# Patient Record
Sex: FEMALE | Race: WHITE | Employment: OTHER | URBAN - METROPOLITAN AREA
[De-identification: names, ages, dates, MRNs, and addresses within clinical notes are randomized per-mention and may not be internally consistent; named-entity substitution may affect disease eponyms.]

---

## 2017-10-11 ENCOUNTER — HOSPITAL ENCOUNTER (OUTPATIENT)
Dept: GENERAL RADIOLOGY | Age: 72
Discharge: HOME | End: 2017-10-11
Payer: MEDICARE

## 2017-10-11 DIAGNOSIS — R13.10 DYSPHAGIA, UNSPECIFIED TYPE: ICD-10-CM

## 2017-10-11 PROCEDURE — 2500000003 HC RX 250 WO HCPCS: Performed by: INTERNAL MEDICINE

## 2017-10-11 PROCEDURE — 74230 X-RAY XM SWLNG FUNCJ C+: CPT

## 2017-10-11 RX ADMIN — BARIUM SULFATE 50 G: 0.81 POWDER, FOR SUSPENSION ORAL at 14:05

## 2017-10-11 RX ADMIN — BARIUM SULFATE 80 ML: 400 SUSPENSION ORAL at 14:05

## 2017-12-07 PROBLEM — M47.812 CERVICAL ARTHRITIS: Status: ACTIVE | Noted: 2017-12-07

## 2017-12-07 PROBLEM — M47.812 SPONDYLOSIS OF CERVICAL REGION WITHOUT MYELOPATHY OR RADICULOPATHY: Status: ACTIVE | Noted: 2017-12-07

## 2018-04-20 ENCOUNTER — HOSPITAL ENCOUNTER (INPATIENT)
Age: 73
LOS: 3 days | Discharge: HOME | DRG: 390 | End: 2018-04-24
Attending: INTERNAL MEDICINE | Admitting: INTERNAL MEDICINE
Payer: MEDICARE

## 2018-04-20 DIAGNOSIS — K56.600 PARTIAL SMALL BOWEL OBSTRUCTION (HCC): Primary | ICD-10-CM

## 2018-04-20 DIAGNOSIS — R11.2 NON-INTRACTABLE VOMITING WITH NAUSEA, UNSPECIFIED VOMITING TYPE: ICD-10-CM

## 2018-04-20 PROCEDURE — 99285 EMERGENCY DEPT VISIT HI MDM: CPT

## 2018-04-20 ASSESSMENT — PAIN DESCRIPTION - FREQUENCY: FREQUENCY: CONTINUOUS

## 2018-04-20 ASSESSMENT — PAIN DESCRIPTION - PAIN TYPE: TYPE: ACUTE PAIN

## 2018-04-20 ASSESSMENT — PAIN SCALES - GENERAL: PAINLEVEL_OUTOF10: 9

## 2018-04-20 ASSESSMENT — PAIN DESCRIPTION - LOCATION: LOCATION: BACK;STERNUM

## 2018-04-20 ASSESSMENT — PAIN DESCRIPTION - DESCRIPTORS: DESCRIPTORS: ACHING

## 2018-04-21 ENCOUNTER — APPOINTMENT (OUTPATIENT)
Dept: CT IMAGING | Age: 73
DRG: 390 | End: 2018-04-21
Payer: MEDICARE

## 2018-04-21 PROBLEM — K56.600 SMALL BOWEL OBSTRUCTION, PARTIAL (HCC): Status: ACTIVE | Noted: 2018-04-21

## 2018-04-21 LAB
ALBUMIN SERPL-MCNC: 4.4 G/DL (ref 3.9–4.9)
ALP BLD-CCNC: 75 U/L (ref 40–130)
ALT SERPL-CCNC: 22 U/L (ref 0–33)
ANION GAP SERPL CALCULATED.3IONS-SCNC: 13 MEQ/L (ref 7–13)
AST SERPL-CCNC: 18 U/L (ref 0–35)
BASOPHILS ABSOLUTE: 0 K/UL (ref 0–0.2)
BASOPHILS RELATIVE PERCENT: 0.3 %
BILIRUB SERPL-MCNC: 0.5 MG/DL (ref 0–1.2)
BUN BLDV-MCNC: 20 MG/DL (ref 8–23)
CALCIUM SERPL-MCNC: 8.7 MG/DL (ref 8.6–10.2)
CHLORIDE BLD-SCNC: 98 MEQ/L (ref 98–107)
CO2: 25 MEQ/L (ref 22–29)
CREAT SERPL-MCNC: 0.81 MG/DL (ref 0.5–0.9)
EOSINOPHILS ABSOLUTE: 0.1 K/UL (ref 0–0.7)
EOSINOPHILS RELATIVE PERCENT: 0.8 %
GFR AFRICAN AMERICAN: >60
GFR NON-AFRICAN AMERICAN: >60
GLOBULIN: 2.4 G/DL (ref 2.3–3.5)
GLUCOSE BLD-MCNC: 123 MG/DL (ref 74–109)
HCT VFR BLD CALC: 44.5 % (ref 37–47)
HEMOGLOBIN: 15 G/DL (ref 12–16)
LACTIC ACID: 1.7 MMOL/L (ref 0.5–2.2)
LIPASE: 72 U/L (ref 13–60)
LYMPHOCYTES ABSOLUTE: 1.9 K/UL (ref 1–4.8)
LYMPHOCYTES RELATIVE PERCENT: 11.6 %
MCH RBC QN AUTO: 30.6 PG (ref 27–31.3)
MCHC RBC AUTO-ENTMCNC: 33.6 % (ref 33–37)
MCV RBC AUTO: 91 FL (ref 82–100)
MONOCYTES ABSOLUTE: 1 K/UL (ref 0.2–0.8)
MONOCYTES RELATIVE PERCENT: 6.5 %
NEUTROPHILS ABSOLUTE: 12.9 K/UL (ref 1.4–6.5)
NEUTROPHILS RELATIVE PERCENT: 80.8 %
PDW BLD-RTO: 14.3 % (ref 11.5–14.5)
PLATELET # BLD: 212 K/UL (ref 130–400)
POTASSIUM SERPL-SCNC: 4.5 MEQ/L (ref 3.5–5.1)
RBC # BLD: 4.89 M/UL (ref 4.2–5.4)
SODIUM BLD-SCNC: 136 MEQ/L (ref 132–144)
TOTAL PROTEIN: 6.8 G/DL (ref 6.4–8.1)
WBC # BLD: 16 K/UL (ref 4.8–10.8)

## 2018-04-21 PROCEDURE — 96372 THER/PROPH/DIAG INJ SC/IM: CPT

## 2018-04-21 PROCEDURE — 83690 ASSAY OF LIPASE: CPT

## 2018-04-21 PROCEDURE — 6360000002 HC RX W HCPCS: Performed by: PHYSICIAN ASSISTANT

## 2018-04-21 PROCEDURE — 85025 COMPLETE CBC W/AUTO DIFF WBC: CPT

## 2018-04-21 PROCEDURE — 1210000000 HC MED SURG R&B

## 2018-04-21 PROCEDURE — 80053 COMPREHEN METABOLIC PANEL: CPT

## 2018-04-21 PROCEDURE — 83605 ASSAY OF LACTIC ACID: CPT

## 2018-04-21 PROCEDURE — 6360000002 HC RX W HCPCS: Performed by: INTERNAL MEDICINE

## 2018-04-21 PROCEDURE — 2580000003 HC RX 258: Performed by: PHYSICIAN ASSISTANT

## 2018-04-21 PROCEDURE — 74176 CT ABD & PELVIS W/O CONTRAST: CPT

## 2018-04-21 PROCEDURE — 2580000003 HC RX 258: Performed by: INTERNAL MEDICINE

## 2018-04-21 PROCEDURE — 36415 COLL VENOUS BLD VENIPUNCTURE: CPT

## 2018-04-21 RX ORDER — PANTOPRAZOLE SODIUM 40 MG/1
40 TABLET, DELAYED RELEASE ORAL
Status: DISCONTINUED | OUTPATIENT
Start: 2018-04-21 | End: 2018-04-23

## 2018-04-21 RX ORDER — MORPHINE SULFATE 2 MG/ML
2 INJECTION, SOLUTION INTRAMUSCULAR; INTRAVENOUS
Status: DISCONTINUED | OUTPATIENT
Start: 2018-04-21 | End: 2018-04-23

## 2018-04-21 RX ORDER — SODIUM CHLORIDE 9 MG/ML
INJECTION, SOLUTION INTRAVENOUS CONTINUOUS
Status: CANCELLED | OUTPATIENT
Start: 2018-04-21

## 2018-04-21 RX ORDER — MORPHINE SULFATE 2 MG/ML
1 INJECTION, SOLUTION INTRAMUSCULAR; INTRAVENOUS
Status: DISCONTINUED | OUTPATIENT
Start: 2018-04-21 | End: 2018-04-23

## 2018-04-21 RX ORDER — SODIUM CHLORIDE 0.9 % (FLUSH) 0.9 %
10 SYRINGE (ML) INJECTION PRN
Status: DISCONTINUED | OUTPATIENT
Start: 2018-04-21 | End: 2018-04-24 | Stop reason: HOSPADM

## 2018-04-21 RX ORDER — HYDRALAZINE HYDROCHLORIDE 20 MG/ML
10 INJECTION INTRAMUSCULAR; INTRAVENOUS EVERY 4 HOURS PRN
Status: DISCONTINUED | OUTPATIENT
Start: 2018-04-21 | End: 2018-04-24 | Stop reason: HOSPADM

## 2018-04-21 RX ORDER — SODIUM CHLORIDE 0.9 % (FLUSH) 0.9 %
10 SYRINGE (ML) INJECTION EVERY 12 HOURS SCHEDULED
Status: DISCONTINUED | OUTPATIENT
Start: 2018-04-21 | End: 2018-04-24 | Stop reason: HOSPADM

## 2018-04-21 RX ORDER — PROMETHAZINE HYDROCHLORIDE 25 MG/ML
25 INJECTION, SOLUTION INTRAMUSCULAR; INTRAVENOUS ONCE
Status: COMPLETED | OUTPATIENT
Start: 2018-04-21 | End: 2018-04-21

## 2018-04-21 RX ORDER — ONDANSETRON 2 MG/ML
4 INJECTION INTRAMUSCULAR; INTRAVENOUS ONCE
Status: DISCONTINUED | OUTPATIENT
Start: 2018-04-21 | End: 2018-04-21 | Stop reason: HOSPADM

## 2018-04-21 RX ORDER — PROMETHAZINE HYDROCHLORIDE 25 MG/ML
6.25 INJECTION, SOLUTION INTRAMUSCULAR; INTRAVENOUS EVERY 6 HOURS PRN
Status: DISCONTINUED | OUTPATIENT
Start: 2018-04-21 | End: 2018-04-24 | Stop reason: HOSPADM

## 2018-04-21 RX ORDER — DICYCLOMINE HYDROCHLORIDE 10 MG/ML
20 INJECTION INTRAMUSCULAR ONCE
Status: COMPLETED | OUTPATIENT
Start: 2018-04-21 | End: 2018-04-21

## 2018-04-21 RX ORDER — DEXTROSE, SODIUM CHLORIDE, SODIUM LACTATE, POTASSIUM CHLORIDE, AND CALCIUM CHLORIDE 5; .6; .31; .03; .02 G/100ML; G/100ML; G/100ML; G/100ML; G/100ML
INJECTION, SOLUTION INTRAVENOUS CONTINUOUS
Status: DISCONTINUED | OUTPATIENT
Start: 2018-04-21 | End: 2018-04-23

## 2018-04-21 RX ORDER — ACETAMINOPHEN 325 MG/1
650 TABLET ORAL EVERY 4 HOURS PRN
Status: DISCONTINUED | OUTPATIENT
Start: 2018-04-21 | End: 2018-04-24 | Stop reason: HOSPADM

## 2018-04-21 RX ORDER — 0.9 % SODIUM CHLORIDE 0.9 %
1000 INTRAVENOUS SOLUTION INTRAVENOUS ONCE
Status: COMPLETED | OUTPATIENT
Start: 2018-04-21 | End: 2018-04-21

## 2018-04-21 RX ADMIN — Medication 10 ML: at 09:46

## 2018-04-21 RX ADMIN — MORPHINE SULFATE 1 MG: 2 INJECTION, SOLUTION INTRAMUSCULAR; INTRAVENOUS at 14:30

## 2018-04-21 RX ADMIN — DICYCLOMINE HYDROCHLORIDE 20 MG: 20 INJECTION, SOLUTION INTRAMUSCULAR at 00:22

## 2018-04-21 RX ADMIN — PROMETHAZINE HYDROCHLORIDE 6.25 MG: 25 INJECTION INTRAMUSCULAR; INTRAVENOUS at 14:39

## 2018-04-21 RX ADMIN — SODIUM CHLORIDE 1000 ML: 9 INJECTION, SOLUTION INTRAVENOUS at 00:23

## 2018-04-21 RX ADMIN — PROMETHAZINE HYDROCHLORIDE 25 MG: 25 INJECTION INTRAMUSCULAR; INTRAVENOUS at 00:22

## 2018-04-21 RX ADMIN — SODIUM CHLORIDE, SODIUM LACTATE, POTASSIUM CHLORIDE, CALCIUM CHLORIDE AND DEXTROSE MONOHYDRATE: 5; 600; 310; 30; 20 INJECTION, SOLUTION INTRAVENOUS at 04:50

## 2018-04-21 RX ADMIN — SODIUM CHLORIDE, SODIUM LACTATE, POTASSIUM CHLORIDE, CALCIUM CHLORIDE AND DEXTROSE MONOHYDRATE: 5; 600; 310; 30; 20 INJECTION, SOLUTION INTRAVENOUS at 20:21

## 2018-04-21 ASSESSMENT — PAIN SCALES - GENERAL
PAINLEVEL_OUTOF10: 5
PAINLEVEL_OUTOF10: 0
PAINLEVEL_OUTOF10: 4
PAINLEVEL_OUTOF10: 6

## 2018-04-21 ASSESSMENT — ENCOUNTER SYMPTOMS
TROUBLE SWALLOWING: 0
ALLERGIC/IMMUNOLOGIC NEGATIVE: 1
BLOOD IN STOOL: 0
NAUSEA: 1
ABDOMINAL DISTENTION: 0
CONSTIPATION: 0
VOMITING: 1
COUGH: 0
SHORTNESS OF BREATH: 0
COLOR CHANGE: 0
APNEA: 0
DIARRHEA: 1
EYE PAIN: 0
WHEEZING: 0
ABDOMINAL PAIN: 1

## 2018-04-21 ASSESSMENT — PAIN DESCRIPTION - LOCATION
LOCATION: BACK
LOCATION: HEAD

## 2018-04-21 ASSESSMENT — PAIN DESCRIPTION - PAIN TYPE
TYPE: ACUTE PAIN
TYPE: ACUTE PAIN

## 2018-04-21 ASSESSMENT — PAIN DESCRIPTION - DESCRIPTORS: DESCRIPTORS: ACHING

## 2018-04-21 ASSESSMENT — PAIN DESCRIPTION - FREQUENCY: FREQUENCY: CONTINUOUS

## 2018-04-22 PROCEDURE — 1210000000 HC MED SURG R&B

## 2018-04-22 PROCEDURE — 2580000003 HC RX 258: Performed by: INTERNAL MEDICINE

## 2018-04-22 RX ADMIN — SODIUM CHLORIDE, SODIUM LACTATE, POTASSIUM CHLORIDE, CALCIUM CHLORIDE AND DEXTROSE MONOHYDRATE: 5; 600; 310; 30; 20 INJECTION, SOLUTION INTRAVENOUS at 11:10

## 2018-04-22 ASSESSMENT — PAIN SCALES - GENERAL
PAINLEVEL_OUTOF10: 0
PAINLEVEL_OUTOF10: 0
PAINLEVEL_OUTOF10: 2
PAINLEVEL_OUTOF10: 2

## 2018-04-23 ENCOUNTER — APPOINTMENT (OUTPATIENT)
Dept: GENERAL RADIOLOGY | Age: 73
DRG: 390 | End: 2018-04-23
Payer: MEDICARE

## 2018-04-23 PROBLEM — M47.812 SPONDYLOSIS OF CERVICAL REGION WITHOUT MYELOPATHY OR RADICULOPATHY: Status: RESOLVED | Noted: 2017-12-07 | Resolved: 2018-04-23

## 2018-04-23 PROBLEM — M47.812 CERVICAL ARTHRITIS: Status: RESOLVED | Noted: 2017-12-07 | Resolved: 2018-04-23

## 2018-04-23 LAB
ANION GAP SERPL CALCULATED.3IONS-SCNC: 11 MEQ/L (ref 7–13)
BUN BLDV-MCNC: 12 MG/DL (ref 8–23)
CALCIUM SERPL-MCNC: 8.8 MG/DL (ref 8.6–10.2)
CHLORIDE BLD-SCNC: 102 MEQ/L (ref 98–107)
CO2: 30 MEQ/L (ref 22–29)
CREAT SERPL-MCNC: 0.78 MG/DL (ref 0.5–0.9)
GFR AFRICAN AMERICAN: >60
GFR NON-AFRICAN AMERICAN: >60
GLUCOSE BLD-MCNC: 105 MG/DL (ref 74–109)
HCT VFR BLD CALC: 46.9 % (ref 37–47)
HEMOGLOBIN: 16 G/DL (ref 12–16)
MCH RBC QN AUTO: 30.8 PG (ref 27–31.3)
MCHC RBC AUTO-ENTMCNC: 34.1 % (ref 33–37)
MCV RBC AUTO: 90.5 FL (ref 82–100)
PDW BLD-RTO: 14.3 % (ref 11.5–14.5)
PLATELET # BLD: 176 K/UL (ref 130–400)
POTASSIUM SERPL-SCNC: 4.5 MEQ/L (ref 3.5–5.1)
RBC # BLD: 5.18 M/UL (ref 4.2–5.4)
SODIUM BLD-SCNC: 143 MEQ/L (ref 132–144)
WBC # BLD: 8.5 K/UL (ref 4.8–10.8)

## 2018-04-23 PROCEDURE — 36415 COLL VENOUS BLD VENIPUNCTURE: CPT

## 2018-04-23 PROCEDURE — 1210000000 HC MED SURG R&B

## 2018-04-23 PROCEDURE — 80048 BASIC METABOLIC PNL TOTAL CA: CPT

## 2018-04-23 PROCEDURE — 85027 COMPLETE CBC AUTOMATED: CPT

## 2018-04-23 PROCEDURE — 6360000002 HC RX W HCPCS: Performed by: INTERNAL MEDICINE

## 2018-04-23 PROCEDURE — 2580000003 HC RX 258: Performed by: INTERNAL MEDICINE

## 2018-04-23 PROCEDURE — C9113 INJ PANTOPRAZOLE SODIUM, VIA: HCPCS | Performed by: INTERNAL MEDICINE

## 2018-04-23 PROCEDURE — 74018 RADEX ABDOMEN 1 VIEW: CPT

## 2018-04-23 RX ORDER — ESCITALOPRAM OXALATE 10 MG/1
10 TABLET ORAL DAILY
Status: DISCONTINUED | OUTPATIENT
Start: 2018-04-23 | End: 2018-04-23

## 2018-04-23 RX ORDER — PANTOPRAZOLE SODIUM 40 MG/1
40 TABLET, DELAYED RELEASE ORAL
Status: DISCONTINUED | OUTPATIENT
Start: 2018-04-24 | End: 2018-04-24 | Stop reason: HOSPADM

## 2018-04-23 RX ORDER — LOSARTAN POTASSIUM 50 MG/1
25 TABLET ORAL DAILY
Status: DISCONTINUED | OUTPATIENT
Start: 2018-04-23 | End: 2018-04-24 | Stop reason: HOSPADM

## 2018-04-23 RX ORDER — GABAPENTIN 300 MG/1
300 CAPSULE ORAL 2 TIMES DAILY PRN
Status: DISCONTINUED | OUTPATIENT
Start: 2018-04-23 | End: 2018-04-23

## 2018-04-23 RX ORDER — LOSARTAN POTASSIUM 25 MG/1
25 TABLET ORAL DAILY
Status: DISCONTINUED | OUTPATIENT
Start: 2018-04-23 | End: 2018-04-23

## 2018-04-23 RX ORDER — PHENAZOPYRIDINE HYDROCHLORIDE 100 MG/1
100 TABLET, FILM COATED ORAL 3 TIMES DAILY PRN
Status: DISCONTINUED | OUTPATIENT
Start: 2018-04-23 | End: 2018-04-24 | Stop reason: HOSPADM

## 2018-04-23 RX ORDER — PANTOPRAZOLE SODIUM 40 MG/10ML
40 INJECTION, POWDER, LYOPHILIZED, FOR SOLUTION INTRAVENOUS DAILY
Status: DISCONTINUED | OUTPATIENT
Start: 2018-04-23 | End: 2018-04-23

## 2018-04-23 RX ORDER — ONDANSETRON 4 MG/1
4 TABLET, FILM COATED ORAL EVERY 8 HOURS PRN
Status: DISCONTINUED | OUTPATIENT
Start: 2018-04-23 | End: 2018-04-24 | Stop reason: HOSPADM

## 2018-04-23 RX ORDER — ASPIRIN 325 MG
325 TABLET ORAL DAILY
Status: DISCONTINUED | OUTPATIENT
Start: 2018-04-23 | End: 2018-04-24 | Stop reason: HOSPADM

## 2018-04-23 RX ORDER — ASPIRIN 325 MG
325 TABLET ORAL DAILY
Status: DISCONTINUED | OUTPATIENT
Start: 2018-04-23 | End: 2018-04-23

## 2018-04-23 RX ORDER — ONDANSETRON 2 MG/ML
4 INJECTION INTRAMUSCULAR; INTRAVENOUS EVERY 6 HOURS PRN
Status: DISCONTINUED | OUTPATIENT
Start: 2018-04-23 | End: 2018-04-23

## 2018-04-23 RX ORDER — ESCITALOPRAM OXALATE 10 MG/1
10 TABLET ORAL DAILY
Status: DISCONTINUED | OUTPATIENT
Start: 2018-04-23 | End: 2018-04-24 | Stop reason: HOSPADM

## 2018-04-23 RX ORDER — 0.9 % SODIUM CHLORIDE 0.9 %
10 VIAL (ML) INJECTION DAILY
Status: DISCONTINUED | OUTPATIENT
Start: 2018-04-23 | End: 2018-04-23

## 2018-04-23 RX ADMIN — Medication 10 ML: at 12:42

## 2018-04-23 RX ADMIN — SODIUM CHLORIDE, SODIUM LACTATE, POTASSIUM CHLORIDE, CALCIUM CHLORIDE AND DEXTROSE MONOHYDRATE: 5; 600; 310; 30; 20 INJECTION, SOLUTION INTRAVENOUS at 00:15

## 2018-04-23 RX ADMIN — ENOXAPARIN SODIUM 40 MG: 40 INJECTION SUBCUTANEOUS at 12:41

## 2018-04-23 RX ADMIN — PANTOPRAZOLE SODIUM 40 MG: 40 INJECTION, POWDER, FOR SOLUTION INTRAVENOUS at 12:42

## 2018-04-23 ASSESSMENT — ENCOUNTER SYMPTOMS
VOMITING: 0
NAUSEA: 0

## 2018-04-23 ASSESSMENT — PAIN SCALES - GENERAL: PAINLEVEL_OUTOF10: 1

## 2018-04-24 VITALS
WEIGHT: 150 LBS | BODY MASS INDEX: 27.6 KG/M2 | TEMPERATURE: 98.1 F | DIASTOLIC BLOOD PRESSURE: 94 MMHG | HEART RATE: 98 BPM | OXYGEN SATURATION: 97 % | SYSTOLIC BLOOD PRESSURE: 122 MMHG | HEIGHT: 62 IN | RESPIRATION RATE: 18 BRPM

## 2018-04-24 PROBLEM — K56.600 SMALL BOWEL OBSTRUCTION, PARTIAL (HCC): Status: RESOLVED | Noted: 2018-04-21 | Resolved: 2018-04-24

## 2018-04-24 PROCEDURE — 6360000002 HC RX W HCPCS: Performed by: INTERNAL MEDICINE

## 2018-04-24 PROCEDURE — 6370000000 HC RX 637 (ALT 250 FOR IP): Performed by: INTERNAL MEDICINE

## 2018-04-24 RX ADMIN — ENOXAPARIN SODIUM 40 MG: 40 INJECTION SUBCUTANEOUS at 10:02

## 2018-04-24 RX ADMIN — PANTOPRAZOLE SODIUM 40 MG: 40 TABLET, DELAYED RELEASE ORAL at 05:59

## 2018-04-24 RX ADMIN — ASPIRIN 325 MG: 325 TABLET, COATED ORAL at 10:03

## 2018-04-24 ASSESSMENT — PAIN SCALES - GENERAL
PAINLEVEL_OUTOF10: 0
PAINLEVEL_OUTOF10: 0

## 2018-04-24 ASSESSMENT — ENCOUNTER SYMPTOMS
NAUSEA: 0
VOMITING: 0

## 2019-03-27 ENCOUNTER — HOSPITAL ENCOUNTER (OUTPATIENT)
Dept: GENERAL RADIOLOGY | Age: 74
Discharge: HOME | End: 2019-03-29
Payer: MEDICARE

## 2019-03-27 DIAGNOSIS — R13.10 DYSPHAGIA, UNSPECIFIED TYPE: ICD-10-CM

## 2019-03-27 PROCEDURE — G8996 SWALLOW CURRENT STATUS: HCPCS

## 2019-03-27 PROCEDURE — 74230 X-RAY XM SWLNG FUNCJ C+: CPT

## 2019-03-27 PROCEDURE — 2500000003 HC RX 250 WO HCPCS: Performed by: PHYSICIAN ASSISTANT

## 2019-03-27 PROCEDURE — G8997 SWALLOW GOAL STATUS: HCPCS

## 2019-03-27 PROCEDURE — 92611 MOTION FLUOROSCOPY/SWALLOW: CPT

## 2019-03-27 RX ADMIN — BARIUM SULFATE 50 G: 0.81 POWDER, FOR SUSPENSION ORAL at 13:12

## 2019-03-27 RX ADMIN — BARIUM SULFATE 80 ML: 400 SUSPENSION ORAL at 13:12

## 2019-12-26 ENCOUNTER — APPOINTMENT (OUTPATIENT)
Dept: GENERAL RADIOLOGY | Age: 74
End: 2019-12-26
Payer: MEDICARE

## 2019-12-26 ENCOUNTER — HOSPITAL ENCOUNTER (OUTPATIENT)
Age: 74
Setting detail: OBSERVATION
Discharge: HOME | End: 2019-12-29
Attending: EMERGENCY MEDICINE | Admitting: INTERNAL MEDICINE
Payer: MEDICARE

## 2019-12-26 DIAGNOSIS — R07.9 CHEST PAIN, UNSPECIFIED TYPE: Primary | ICD-10-CM

## 2019-12-26 LAB
ALBUMIN SERPL-MCNC: 3.9 G/DL (ref 3.5–4.6)
ALP BLD-CCNC: 73 U/L (ref 40–130)
ALT SERPL-CCNC: 17 U/L (ref 0–33)
ANION GAP SERPL CALCULATED.3IONS-SCNC: 10 MEQ/L (ref 9–15)
APTT: 30.2 SEC (ref 24.4–36.8)
AST SERPL-CCNC: 15 U/L (ref 0–35)
BASOPHILS ABSOLUTE: 0 K/UL (ref 0–0.2)
BASOPHILS RELATIVE PERCENT: 0.5 %
BILIRUB SERPL-MCNC: <0.2 MG/DL (ref 0.2–0.7)
BILIRUBIN URINE: NEGATIVE
BLOOD, URINE: NEGATIVE
BUN BLDV-MCNC: 11 MG/DL (ref 8–23)
CALCIUM SERPL-MCNC: 9 MG/DL (ref 8.5–9.9)
CHLORIDE BLD-SCNC: 106 MEQ/L (ref 95–107)
CLARITY: ABNORMAL
CO2: 25 MEQ/L (ref 20–31)
COLOR: YELLOW
CREAT SERPL-MCNC: 0.66 MG/DL (ref 0.5–0.9)
EKG ATRIAL RATE: 68 BPM
EKG P AXIS: -8 DEGREES
EKG P-R INTERVAL: 152 MS
EKG Q-T INTERVAL: 376 MS
EKG QRS DURATION: 78 MS
EKG QTC CALCULATION (BAZETT): 399 MS
EKG R AXIS: -6 DEGREES
EKG T AXIS: 0 DEGREES
EKG VENTRICULAR RATE: 68 BPM
EOSINOPHILS ABSOLUTE: 0 K/UL (ref 0–0.7)
EOSINOPHILS RELATIVE PERCENT: 0.5 %
GFR AFRICAN AMERICAN: >60
GFR NON-AFRICAN AMERICAN: >60
GLOBULIN: 2.7 G/DL (ref 2.3–3.5)
GLUCOSE BLD-MCNC: 102 MG/DL (ref 70–99)
GLUCOSE URINE: NEGATIVE MG/DL
HCT VFR BLD CALC: 40.9 % (ref 37–47)
HEMOGLOBIN: 13.8 G/DL (ref 12–16)
INR BLD: 1
KETONES, URINE: NEGATIVE MG/DL
LEUKOCYTE ESTERASE, URINE: NEGATIVE
LYMPHOCYTES ABSOLUTE: 1.6 K/UL (ref 1–4.8)
LYMPHOCYTES RELATIVE PERCENT: 23.9 %
MAGNESIUM: 2.2 MG/DL (ref 1.7–2.4)
MCH RBC QN AUTO: 30.7 PG (ref 27–31.3)
MCHC RBC AUTO-ENTMCNC: 33.7 % (ref 33–37)
MCV RBC AUTO: 91.1 FL (ref 82–100)
MONOCYTES ABSOLUTE: 0.5 K/UL (ref 0.2–0.8)
MONOCYTES RELATIVE PERCENT: 7.6 %
NEUTROPHILS ABSOLUTE: 4.5 K/UL (ref 1.4–6.5)
NEUTROPHILS RELATIVE PERCENT: 67.5 %
NITRITE, URINE: NEGATIVE
PDW BLD-RTO: 14.2 % (ref 11.5–14.5)
PH UA: 5.5 (ref 5–9)
PLATELET # BLD: 204 K/UL (ref 130–400)
POTASSIUM SERPL-SCNC: 3.9 MEQ/L (ref 3.4–4.9)
PRO-BNP: 143 PG/ML
PROTEIN UA: NEGATIVE MG/DL
PROTHROMBIN TIME: 13 SEC (ref 12.3–14.9)
RBC # BLD: 4.48 M/UL (ref 4.2–5.4)
SODIUM BLD-SCNC: 141 MEQ/L (ref 135–144)
SPECIFIC GRAVITY UA: 1.01 (ref 1–1.03)
TOTAL PROTEIN: 6.6 G/DL (ref 6.3–8)
TROPONIN: <0.01 NG/ML (ref 0–0.01)
UROBILINOGEN, URINE: 0.2 E.U./DL
WBC # BLD: 6.6 K/UL (ref 4.8–10.8)

## 2019-12-26 PROCEDURE — 85730 THROMBOPLASTIN TIME PARTIAL: CPT

## 2019-12-26 PROCEDURE — 83880 ASSAY OF NATRIURETIC PEPTIDE: CPT

## 2019-12-26 PROCEDURE — 81003 URINALYSIS AUTO W/O SCOPE: CPT

## 2019-12-26 PROCEDURE — 6370000000 HC RX 637 (ALT 250 FOR IP): Performed by: INTERNAL MEDICINE

## 2019-12-26 PROCEDURE — 36415 COLL VENOUS BLD VENIPUNCTURE: CPT

## 2019-12-26 PROCEDURE — 87086 URINE CULTURE/COLONY COUNT: CPT

## 2019-12-26 PROCEDURE — 96372 THER/PROPH/DIAG INJ SC/IM: CPT

## 2019-12-26 PROCEDURE — 96375 TX/PRO/DX INJ NEW DRUG ADDON: CPT

## 2019-12-26 PROCEDURE — G0378 HOSPITAL OBSERVATION PER HR: HCPCS

## 2019-12-26 PROCEDURE — 71046 X-RAY EXAM CHEST 2 VIEWS: CPT

## 2019-12-26 PROCEDURE — 6360000002 HC RX W HCPCS: Performed by: EMERGENCY MEDICINE

## 2019-12-26 PROCEDURE — 83735 ASSAY OF MAGNESIUM: CPT

## 2019-12-26 PROCEDURE — 6370000000 HC RX 637 (ALT 250 FOR IP): Performed by: EMERGENCY MEDICINE

## 2019-12-26 PROCEDURE — 2580000003 HC RX 258: Performed by: INTERNAL MEDICINE

## 2019-12-26 PROCEDURE — 85025 COMPLETE CBC W/AUTO DIFF WBC: CPT

## 2019-12-26 PROCEDURE — 80053 COMPREHEN METABOLIC PANEL: CPT

## 2019-12-26 PROCEDURE — 6360000002 HC RX W HCPCS: Performed by: INTERNAL MEDICINE

## 2019-12-26 PROCEDURE — 96374 THER/PROPH/DIAG INJ IV PUSH: CPT

## 2019-12-26 PROCEDURE — 2580000003 HC RX 258: Performed by: EMERGENCY MEDICINE

## 2019-12-26 PROCEDURE — 99285 EMERGENCY DEPT VISIT HI MDM: CPT

## 2019-12-26 PROCEDURE — 85610 PROTHROMBIN TIME: CPT

## 2019-12-26 PROCEDURE — 93005 ELECTROCARDIOGRAM TRACING: CPT | Performed by: EMERGENCY MEDICINE

## 2019-12-26 PROCEDURE — 93010 ELECTROCARDIOGRAM REPORT: CPT | Performed by: INTERNAL MEDICINE

## 2019-12-26 PROCEDURE — 84484 ASSAY OF TROPONIN QUANT: CPT

## 2019-12-26 PROCEDURE — 99219 PR INITIAL OBSERVATION CARE/DAY 50 MINUTES: CPT | Performed by: INTERNAL MEDICINE

## 2019-12-26 RX ORDER — ONDANSETRON 2 MG/ML
4 INJECTION INTRAMUSCULAR; INTRAVENOUS EVERY 6 HOURS PRN
Status: DISCONTINUED | OUTPATIENT
Start: 2019-12-26 | End: 2019-12-29 | Stop reason: HOSPADM

## 2019-12-26 RX ORDER — NITROGLYCERIN 0.4 MG/1
0.4 TABLET SUBLINGUAL ONCE
Status: COMPLETED | OUTPATIENT
Start: 2019-12-26 | End: 2019-12-26

## 2019-12-26 RX ORDER — PANTOPRAZOLE SODIUM 40 MG/1
40 TABLET, DELAYED RELEASE ORAL
Status: DISCONTINUED | OUTPATIENT
Start: 2019-12-27 | End: 2019-12-29 | Stop reason: HOSPADM

## 2019-12-26 RX ORDER — ONDANSETRON 2 MG/ML
4 INJECTION INTRAMUSCULAR; INTRAVENOUS ONCE
Status: COMPLETED | OUTPATIENT
Start: 2019-12-26 | End: 2019-12-26

## 2019-12-26 RX ORDER — ASPIRIN 325 MG
325 TABLET ORAL DAILY
Status: DISCONTINUED | OUTPATIENT
Start: 2019-12-27 | End: 2019-12-29 | Stop reason: HOSPADM

## 2019-12-26 RX ORDER — NITROGLYCERIN 0.4 MG/1
0.4 TABLET SUBLINGUAL EVERY 5 MIN PRN
Status: DISCONTINUED | OUTPATIENT
Start: 2019-12-26 | End: 2019-12-29 | Stop reason: HOSPADM

## 2019-12-26 RX ORDER — ROSUVASTATIN CALCIUM 10 MG/1
10 TABLET, COATED ORAL DAILY
Status: DISCONTINUED | OUTPATIENT
Start: 2019-12-26 | End: 2019-12-29 | Stop reason: HOSPADM

## 2019-12-26 RX ORDER — ACETAMINOPHEN 325 MG/1
650 TABLET ORAL EVERY 4 HOURS PRN
Status: DISCONTINUED | OUTPATIENT
Start: 2019-12-26 | End: 2019-12-29 | Stop reason: HOSPADM

## 2019-12-26 RX ORDER — HYDRALAZINE HYDROCHLORIDE 20 MG/ML
10 INJECTION INTRAMUSCULAR; INTRAVENOUS EVERY 4 HOURS PRN
Status: DISCONTINUED | OUTPATIENT
Start: 2019-12-26 | End: 2019-12-27

## 2019-12-26 RX ORDER — ESCITALOPRAM OXALATE 10 MG/1
10 TABLET ORAL DAILY
Status: DISCONTINUED | OUTPATIENT
Start: 2019-12-26 | End: 2019-12-26

## 2019-12-26 RX ORDER — MORPHINE SULFATE 4 MG/ML
4 INJECTION, SOLUTION INTRAMUSCULAR; INTRAVENOUS EVERY 4 HOURS PRN
Status: DISCONTINUED | OUTPATIENT
Start: 2019-12-26 | End: 2019-12-29 | Stop reason: HOSPADM

## 2019-12-26 RX ORDER — ASPIRIN 81 MG/1
324 TABLET, CHEWABLE ORAL ONCE
Status: COMPLETED | OUTPATIENT
Start: 2019-12-26 | End: 2019-12-26

## 2019-12-26 RX ORDER — LOSARTAN POTASSIUM 25 MG/1
25 TABLET ORAL DAILY
Status: DISCONTINUED | OUTPATIENT
Start: 2019-12-26 | End: 2019-12-29 | Stop reason: HOSPADM

## 2019-12-26 RX ORDER — 0.9 % SODIUM CHLORIDE 0.9 %
1000 INTRAVENOUS SOLUTION INTRAVENOUS ONCE
Status: COMPLETED | OUTPATIENT
Start: 2019-12-26 | End: 2019-12-26

## 2019-12-26 RX ORDER — SODIUM CHLORIDE 0.9 % (FLUSH) 0.9 %
10 SYRINGE (ML) INJECTION EVERY 12 HOURS SCHEDULED
Status: DISCONTINUED | OUTPATIENT
Start: 2019-12-26 | End: 2019-12-29 | Stop reason: HOSPADM

## 2019-12-26 RX ORDER — SODIUM CHLORIDE 0.9 % (FLUSH) 0.9 %
10 SYRINGE (ML) INJECTION PRN
Status: DISCONTINUED | OUTPATIENT
Start: 2019-12-26 | End: 2019-12-27

## 2019-12-26 RX ORDER — MORPHINE SULFATE 2 MG/ML
4 INJECTION, SOLUTION INTRAMUSCULAR; INTRAVENOUS
Status: COMPLETED | OUTPATIENT
Start: 2019-12-26 | End: 2019-12-26

## 2019-12-26 RX ORDER — PHENAZOPYRIDINE HYDROCHLORIDE 100 MG/1
100 TABLET, FILM COATED ORAL
Status: DISCONTINUED | OUTPATIENT
Start: 2019-12-26 | End: 2019-12-29 | Stop reason: HOSPADM

## 2019-12-26 RX ORDER — GABAPENTIN 300 MG/1
300 CAPSULE ORAL 2 TIMES DAILY PRN
Status: DISCONTINUED | OUTPATIENT
Start: 2019-12-26 | End: 2019-12-29 | Stop reason: HOSPADM

## 2019-12-26 RX ADMIN — ENOXAPARIN SODIUM 40 MG: 40 INJECTION SUBCUTANEOUS at 21:03

## 2019-12-26 RX ADMIN — NITROGLYCERIN 0.4 MG: 0.4 TABLET, ORALLY DISINTEGRATING SUBLINGUAL at 11:33

## 2019-12-26 RX ADMIN — ACETAMINOPHEN 650 MG: 325 TABLET ORAL at 21:04

## 2019-12-26 RX ADMIN — SODIUM CHLORIDE 1000 ML: 9 INJECTION, SOLUTION INTRAVENOUS at 11:33

## 2019-12-26 RX ADMIN — ASPIRIN 81 MG 324 MG: 81 TABLET ORAL at 12:40

## 2019-12-26 RX ADMIN — MORPHINE SULFATE 4 MG: 2 INJECTION, SOLUTION INTRAMUSCULAR; INTRAVENOUS at 11:38

## 2019-12-26 RX ADMIN — ONDANSETRON 4 MG: 2 INJECTION INTRAMUSCULAR; INTRAVENOUS at 11:41

## 2019-12-26 RX ADMIN — Medication 10 ML: at 21:04

## 2019-12-26 ASSESSMENT — PAIN DESCRIPTION - FREQUENCY
FREQUENCY: INTERMITTENT

## 2019-12-26 ASSESSMENT — PAIN DESCRIPTION - DESCRIPTORS
DESCRIPTORS: DULL;SHARP
DESCRIPTORS: DULL;ACHING
DESCRIPTORS: DULL;ACHING

## 2019-12-26 ASSESSMENT — PAIN DESCRIPTION - LOCATION
LOCATION: CHEST
LOCATION: CHEST;HEAD
LOCATION: CHEST

## 2019-12-26 ASSESSMENT — ENCOUNTER SYMPTOMS
EYES NEGATIVE: 1
GASTROINTESTINAL NEGATIVE: 1
SHORTNESS OF BREATH: 0
COUGH: 0
ALLERGIC/IMMUNOLOGIC NEGATIVE: 1
VOMITING: 0
BACK PAIN: 0
CHEST TIGHTNESS: 1
WHEEZING: 0
DIARRHEA: 0
SORE THROAT: 0
SHORTNESS OF BREATH: 1
NAUSEA: 0
ABDOMINAL PAIN: 0

## 2019-12-26 ASSESSMENT — PAIN SCALES - GENERAL
PAINLEVEL_OUTOF10: 0
PAINLEVEL_OUTOF10: 5
PAINLEVEL_OUTOF10: 3
PAINLEVEL_OUTOF10: 0

## 2019-12-26 ASSESSMENT — PAIN DESCRIPTION - PAIN TYPE
TYPE: ACUTE PAIN
TYPE: ACUTE PAIN

## 2019-12-27 PROCEDURE — 2700000000 HC OXYGEN THERAPY PER DAY

## 2019-12-27 PROCEDURE — 96375 TX/PRO/DX INJ NEW DRUG ADDON: CPT

## 2019-12-27 PROCEDURE — 6370000000 HC RX 637 (ALT 250 FOR IP): Performed by: INTERNAL MEDICINE

## 2019-12-27 PROCEDURE — G0378 HOSPITAL OBSERVATION PER HR: HCPCS

## 2019-12-27 PROCEDURE — 96376 TX/PRO/DX INJ SAME DRUG ADON: CPT

## 2019-12-27 PROCEDURE — 6360000002 HC RX W HCPCS: Performed by: INTERNAL MEDICINE

## 2019-12-27 PROCEDURE — 99217 PR OBSERVATION CARE DISCHARGE MANAGEMENT: CPT | Performed by: INTERNAL MEDICINE

## 2019-12-27 PROCEDURE — 96372 THER/PROPH/DIAG INJ SC/IM: CPT

## 2019-12-27 PROCEDURE — 2580000003 HC RX 258: Performed by: INTERNAL MEDICINE

## 2019-12-27 RX ORDER — SODIUM CHLORIDE 9 MG/ML
INJECTION, SOLUTION INTRAVENOUS CONTINUOUS
Status: DISCONTINUED | OUTPATIENT
Start: 2019-12-27 | End: 2019-12-29 | Stop reason: HOSPADM

## 2019-12-27 RX ORDER — LOSARTAN POTASSIUM 25 MG/1
25 TABLET ORAL DAILY
Qty: 30 TABLET | Refills: 3 | Status: SHIPPED | OUTPATIENT
Start: 2019-12-27 | End: 2020-01-30

## 2019-12-27 RX ORDER — SUMATRIPTAN 50 MG/1
50 TABLET, FILM COATED ORAL ONCE
Status: DISCONTINUED | OUTPATIENT
Start: 2019-12-27 | End: 2019-12-27

## 2019-12-27 RX ORDER — HYDRALAZINE HYDROCHLORIDE 20 MG/ML
20 INJECTION INTRAMUSCULAR; INTRAVENOUS EVERY 4 HOURS PRN
Status: DISCONTINUED | OUTPATIENT
Start: 2019-12-27 | End: 2019-12-29

## 2019-12-27 RX ORDER — SODIUM CHLORIDE 9 MG/ML
INJECTION, SOLUTION INTRAVENOUS
Status: DISPENSED
Start: 2019-12-27 | End: 2019-12-28

## 2019-12-27 RX ORDER — SUMATRIPTAN 6 MG/.5ML
6 INJECTION, SOLUTION SUBCUTANEOUS ONCE
Status: COMPLETED | OUTPATIENT
Start: 2019-12-27 | End: 2019-12-27

## 2019-12-27 RX ADMIN — HYDRALAZINE HYDROCHLORIDE 10 MG: 20 INJECTION INTRAMUSCULAR; INTRAVENOUS at 07:46

## 2019-12-27 RX ADMIN — Medication 10 ML: at 07:45

## 2019-12-27 RX ADMIN — SODIUM CHLORIDE: 9 INJECTION, SOLUTION INTRAVENOUS at 18:13

## 2019-12-27 RX ADMIN — ONDANSETRON 4 MG: 2 INJECTION INTRAMUSCULAR; INTRAVENOUS at 18:14

## 2019-12-27 RX ADMIN — ONDANSETRON 4 MG: 2 INJECTION INTRAMUSCULAR; INTRAVENOUS at 07:45

## 2019-12-27 RX ADMIN — ONDANSETRON 4 MG: 2 INJECTION INTRAMUSCULAR; INTRAVENOUS at 22:08

## 2019-12-27 RX ADMIN — PANTOPRAZOLE SODIUM 40 MG: 40 TABLET, DELAYED RELEASE ORAL at 06:28

## 2019-12-27 RX ADMIN — ACETAMINOPHEN 650 MG: 325 TABLET ORAL at 10:05

## 2019-12-27 RX ADMIN — SUMATRIPTAN SUCCINATE 6 MG: 6 INJECTION SUBCUTANEOUS at 13:39

## 2019-12-27 RX ADMIN — HYDRALAZINE HYDROCHLORIDE 20 MG: 20 INJECTION INTRAMUSCULAR; INTRAVENOUS at 15:28

## 2019-12-27 ASSESSMENT — PAIN SCALES - GENERAL
PAINLEVEL_OUTOF10: 8
PAINLEVEL_OUTOF10: 0

## 2019-12-28 LAB
REASON FOR REJECTION: NORMAL
REJECTED TEST: NORMAL
TROPONIN: <0.01 NG/ML (ref 0–0.01)
TROPONIN: <0.01 NG/ML (ref 0–0.01)
URINE CULTURE, ROUTINE: NORMAL

## 2019-12-28 PROCEDURE — 2580000003 HC RX 258: Performed by: INTERNAL MEDICINE

## 2019-12-28 PROCEDURE — 84484 ASSAY OF TROPONIN QUANT: CPT

## 2019-12-28 PROCEDURE — 6370000000 HC RX 637 (ALT 250 FOR IP): Performed by: INTERNAL MEDICINE

## 2019-12-28 PROCEDURE — 6360000002 HC RX W HCPCS: Performed by: INTERNAL MEDICINE

## 2019-12-28 PROCEDURE — 96372 THER/PROPH/DIAG INJ SC/IM: CPT

## 2019-12-28 PROCEDURE — 2700000000 HC OXYGEN THERAPY PER DAY

## 2019-12-28 PROCEDURE — G0378 HOSPITAL OBSERVATION PER HR: HCPCS

## 2019-12-28 PROCEDURE — 36415 COLL VENOUS BLD VENIPUNCTURE: CPT

## 2019-12-28 PROCEDURE — 96376 TX/PRO/DX INJ SAME DRUG ADON: CPT

## 2019-12-28 RX ORDER — SUMATRIPTAN 6 MG/.5ML
6 INJECTION, SOLUTION SUBCUTANEOUS ONCE
Status: COMPLETED | OUTPATIENT
Start: 2019-12-28 | End: 2019-12-28

## 2019-12-28 RX ADMIN — ONDANSETRON 4 MG: 2 INJECTION INTRAMUSCULAR; INTRAVENOUS at 08:34

## 2019-12-28 RX ADMIN — ONDANSETRON 4 MG: 2 INJECTION INTRAMUSCULAR; INTRAVENOUS at 02:05

## 2019-12-28 RX ADMIN — PHENAZOPYRIDINE 100 MG: 100 TABLET ORAL at 08:34

## 2019-12-28 RX ADMIN — SODIUM CHLORIDE: 9 INJECTION, SOLUTION INTRAVENOUS at 08:40

## 2019-12-28 RX ADMIN — ROSUVASTATIN CALCIUM 10 MG: 10 TABLET, FILM COATED ORAL at 20:59

## 2019-12-28 RX ADMIN — LOSARTAN POTASSIUM 25 MG: 25 TABLET ORAL at 08:34

## 2019-12-28 RX ADMIN — ENOXAPARIN SODIUM 40 MG: 40 INJECTION SUBCUTANEOUS at 08:34

## 2019-12-28 RX ADMIN — SUMATRIPTAN SUCCINATE 6 MG: 6 INJECTION SUBCUTANEOUS at 09:44

## 2019-12-28 RX ADMIN — ASPIRIN 325 MG: 325 TABLET, COATED ORAL at 08:34

## 2019-12-28 RX ADMIN — PHENAZOPYRIDINE 100 MG: 100 TABLET ORAL at 16:47

## 2019-12-28 RX ADMIN — Medication 10 ML: at 21:00

## 2019-12-28 RX ADMIN — Medication 10 ML: at 08:35

## 2019-12-28 RX ADMIN — PANTOPRAZOLE SODIUM 40 MG: 40 TABLET, DELAYED RELEASE ORAL at 05:48

## 2019-12-28 RX ADMIN — PHENAZOPYRIDINE 100 MG: 100 TABLET ORAL at 12:07

## 2019-12-28 RX ADMIN — SODIUM CHLORIDE: 9 INJECTION, SOLUTION INTRAVENOUS at 20:59

## 2019-12-28 ASSESSMENT — PAIN SCALES - GENERAL
PAINLEVEL_OUTOF10: 0
PAINLEVEL_OUTOF10: 8

## 2019-12-29 VITALS
SYSTOLIC BLOOD PRESSURE: 132 MMHG | HEART RATE: 75 BPM | OXYGEN SATURATION: 95 % | BODY MASS INDEX: 25.43 KG/M2 | WEIGHT: 152.6 LBS | HEIGHT: 65 IN | DIASTOLIC BLOOD PRESSURE: 74 MMHG | RESPIRATION RATE: 18 BRPM | TEMPERATURE: 98.4 F

## 2019-12-29 LAB
TROPONIN: <0.01 NG/ML (ref 0–0.01)
TROPONIN: <0.01 NG/ML (ref 0–0.01)

## 2019-12-29 PROCEDURE — 6360000002 HC RX W HCPCS: Performed by: INTERNAL MEDICINE

## 2019-12-29 PROCEDURE — 96372 THER/PROPH/DIAG INJ SC/IM: CPT

## 2019-12-29 PROCEDURE — 6370000000 HC RX 637 (ALT 250 FOR IP): Performed by: INTERNAL MEDICINE

## 2019-12-29 PROCEDURE — 36415 COLL VENOUS BLD VENIPUNCTURE: CPT

## 2019-12-29 PROCEDURE — G0378 HOSPITAL OBSERVATION PER HR: HCPCS

## 2019-12-29 PROCEDURE — 84484 ASSAY OF TROPONIN QUANT: CPT

## 2019-12-29 PROCEDURE — 2580000003 HC RX 258: Performed by: INTERNAL MEDICINE

## 2019-12-29 RX ADMIN — PHENAZOPYRIDINE 100 MG: 100 TABLET ORAL at 08:43

## 2019-12-29 RX ADMIN — SODIUM CHLORIDE: 9 INJECTION, SOLUTION INTRAVENOUS at 08:47

## 2019-12-29 RX ADMIN — ENOXAPARIN SODIUM 40 MG: 40 INJECTION SUBCUTANEOUS at 08:44

## 2019-12-29 RX ADMIN — PANTOPRAZOLE SODIUM 40 MG: 40 TABLET, DELAYED RELEASE ORAL at 05:29

## 2019-12-29 RX ADMIN — LOSARTAN POTASSIUM 25 MG: 25 TABLET ORAL at 08:44

## 2019-12-29 RX ADMIN — ROSUVASTATIN CALCIUM 10 MG: 10 TABLET, FILM COATED ORAL at 08:43

## 2019-12-29 RX ADMIN — ASPIRIN 325 MG: 325 TABLET, COATED ORAL at 08:43

## 2020-01-07 ENCOUNTER — OFFICE VISIT (OUTPATIENT)
Dept: FAMILY MEDICINE CLINIC | Age: 75
End: 2020-01-07
Payer: MEDICARE

## 2020-01-07 VITALS
BODY MASS INDEX: 24.99 KG/M2 | DIASTOLIC BLOOD PRESSURE: 80 MMHG | WEIGHT: 150 LBS | OXYGEN SATURATION: 97 % | HEART RATE: 67 BPM | SYSTOLIC BLOOD PRESSURE: 130 MMHG | TEMPERATURE: 98.2 F | RESPIRATION RATE: 16 BRPM | HEIGHT: 65 IN

## 2020-01-07 PROCEDURE — G8420 CALC BMI NORM PARAMETERS: HCPCS | Performed by: INTERNAL MEDICINE

## 2020-01-07 PROCEDURE — G8427 DOCREV CUR MEDS BY ELIG CLIN: HCPCS | Performed by: INTERNAL MEDICINE

## 2020-01-07 PROCEDURE — 99213 OFFICE O/P EST LOW 20 MIN: CPT | Performed by: INTERNAL MEDICINE

## 2020-01-07 PROCEDURE — 1090F PRES/ABSN URINE INCON ASSESS: CPT | Performed by: INTERNAL MEDICINE

## 2020-01-07 PROCEDURE — 1036F TOBACCO NON-USER: CPT | Performed by: INTERNAL MEDICINE

## 2020-01-07 PROCEDURE — 4040F PNEUMOC VAC/ADMIN/RCVD: CPT | Performed by: INTERNAL MEDICINE

## 2020-01-07 PROCEDURE — G8484 FLU IMMUNIZE NO ADMIN: HCPCS | Performed by: INTERNAL MEDICINE

## 2020-01-07 PROCEDURE — 1123F ACP DISCUSS/DSCN MKR DOCD: CPT | Performed by: INTERNAL MEDICINE

## 2020-01-07 PROCEDURE — G8400 PT W/DXA NO RESULTS DOC: HCPCS | Performed by: INTERNAL MEDICINE

## 2020-01-07 RX ORDER — OXYMETAZOLINE HYDROCHLORIDE 0.05 G/100ML
2 SPRAY NASAL 2 TIMES DAILY
Qty: 1 BOTTLE | Refills: 3 | Status: SHIPPED | OUTPATIENT
Start: 2020-01-07 | End: 2021-01-06

## 2020-01-07 RX ORDER — AZITHROMYCIN 250 MG/1
250 TABLET, FILM COATED ORAL SEE ADMIN INSTRUCTIONS
Qty: 6 TABLET | Refills: 0 | Status: SHIPPED | OUTPATIENT
Start: 2020-01-07 | End: 2020-01-12

## 2020-01-07 ASSESSMENT — ENCOUNTER SYMPTOMS
SORE THROAT: 0
SHORTNESS OF BREATH: 0
SINUS PRESSURE: 0
ABDOMINAL PAIN: 0
BLOOD IN STOOL: 0
EYE REDNESS: 0
PHOTOPHOBIA: 0
COLOR CHANGE: 0
VOICE CHANGE: 0
EYE PAIN: 0
FACIAL SWELLING: 0
EYE DISCHARGE: 0
ABDOMINAL DISTENTION: 0
EYE ITCHING: 0
APNEA: 0
BACK PAIN: 0
DIARRHEA: 0
NAUSEA: 0
CHEST TIGHTNESS: 0
VOMITING: 0
SINUS PAIN: 0
RECTAL PAIN: 0
COUGH: 0
WHEEZING: 0
RHINORRHEA: 0
TROUBLE SWALLOWING: 0
CONSTIPATION: 0

## 2020-01-07 ASSESSMENT — PATIENT HEALTH QUESTIONNAIRE - PHQ9
SUM OF ALL RESPONSES TO PHQ9 QUESTIONS 1 & 2: 0
1. LITTLE INTEREST OR PLEASURE IN DOING THINGS: 0
SUM OF ALL RESPONSES TO PHQ QUESTIONS 1-9: 0
SUM OF ALL RESPONSES TO PHQ QUESTIONS 1-9: 0
2. FEELING DOWN, DEPRESSED OR HOPELESS: 0

## 2020-01-07 NOTE — PROGRESS NOTES
tremors, seizures, syncope, facial asymmetry, speech difficulty, weakness, light-headedness, numbness and headaches. Hematological: Negative for adenopathy. Does not bruise/bleed easily. Psychiatric/Behavioral: Negative for agitation, confusion, decreased concentration, hallucinations, self-injury, sleep disturbance and suicidal ideas. The patient is not nervous/anxious. Prior to Visit Medications    Medication Sig Taking? Authorizing Provider   azithromycin (ZITHROMAX) 250 MG tablet Take 1 tablet by mouth See Admin Instructions for 5 days 500mg on day 1 followed by 250mg on days 2 - 5 Yes Marcos Pabon MD   oxymetazoline (12 HOUR NASAL SPRAY) 0.05 % nasal spray 2 sprays by Nasal route 2 times daily Yes Marcos Pabon MD   losartan (COZAAR) 25 MG tablet Take 1 tablet by mouth daily Yes Fabien J Holiday, DO   aspirin 325 MG tablet Take 325 mg by mouth daily  Yes Historical Provider, MD   esomeprazole (NEXIUM) 40 MG capsule Take 40 mg by mouth every morning (before breakfast)  Yes Historical Provider, MD        Allergies   Allergen Reactions    Latex     Amoxicillin-Pot Clavulanate      Other reaction(s): Vomiting    Carbidopa-Levodopa      Other reaction(s): Unknown  Effects the eyes    Iv [Iodides]     Nitrofurantoin Other (See Comments)     Myalgia    Plavix [Clopidogrel Bisulfate]     Pravastatin Other (See Comments)     Myalgia       Past Medical History:   Diagnosis Date    Anticoagulant long-term use     Carotid artery stenosis     Family history of early CAD 6/28/2016    Fibromyalgia     GERD (gastroesophageal reflux disease)     Hypertension     Neuropathy     Sleep apnea     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 myelopathy or radiculopathy        No past surgical history on file.     Social History     Socioeconomic History    Marital status:      Spouse name: Not on file    Number of children: Not on file    Years of education: Not on file    Highest education level: Not on file   Occupational History    Not on file   Social Needs    Financial resource strain: Not on file    Food insecurity:     Worry: Not on file     Inability: Not on file    Transportation needs:     Medical: Not on file     Non-medical: Not on file   Tobacco Use    Smoking status: Never Smoker    Smokeless tobacco: Never Used   Substance and Sexual Activity    Alcohol use: No    Drug use: Not on file    Sexual activity: Not on file   Lifestyle    Physical activity:     Days per week: Not on file     Minutes per session: Not on file    Stress: Not on file   Relationships    Social connections:     Talks on phone: Not on file     Gets together: Not on file     Attends Rastafarian service: Not on file     Active member of club or organization: Not on file     Attends meetings of clubs or organizations: Not on file     Relationship status: Not on file    Intimate partner violence:     Fear of current or ex partner: Not on file     Emotionally abused: Not on file     Physically abused: Not on file     Forced sexual activity: Not on file   Other Topics Concern    Not on file   Social History Narrative    Not on file        No family history on file. Vitals:    01/07/20 1557   BP: 130/80   Pulse: 67   Resp: 16   Temp: 98.2 °F (36.8 °C)   SpO2: 97%   Weight: 150 lb (68 kg)   Height: 5' 5\" (1.651 m)     Estimated body mass index is 24.96 kg/m² as calculated from the following:    Height as of this encounter: 5' 5\" (1.651 m). Weight as of this encounter: 150 lb (68 kg). Physical Exam  Constitutional:       General: She is not in acute distress. Appearance: She is well-developed. HENT:      Head: Normocephalic. Right Ear: External ear normal.      Left Ear: External ear normal.   Eyes:      Conjunctiva/sclera: Conjunctivae normal.   Neck:      Musculoskeletal: Neck supple. Vascular: No JVD. Trachea: No tracheal deviation.    Cardiovascular:      Rate and Rhythm: Normal rate and

## 2020-01-09 ENCOUNTER — OFFICE VISIT (OUTPATIENT)
Dept: CARDIOLOGY CLINIC | Age: 75
End: 2020-01-09
Payer: MEDICARE

## 2020-01-09 VITALS
OXYGEN SATURATION: 97 % | RESPIRATION RATE: 18 BRPM | WEIGHT: 153.6 LBS | SYSTOLIC BLOOD PRESSURE: 128 MMHG | HEART RATE: 100 BPM | DIASTOLIC BLOOD PRESSURE: 84 MMHG | BODY MASS INDEX: 25.56 KG/M2

## 2020-01-09 PROBLEM — G47.33 OSA (OBSTRUCTIVE SLEEP APNEA): Status: ACTIVE | Noted: 2020-01-09

## 2020-01-09 PROBLEM — E78.5 DYSLIPIDEMIA: Status: ACTIVE | Noted: 2020-01-09

## 2020-01-09 PROBLEM — I65.21 STENOSIS OF RIGHT CAROTID ARTERY: Status: ACTIVE | Noted: 2020-01-09

## 2020-01-09 PROCEDURE — G8417 CALC BMI ABV UP PARAM F/U: HCPCS | Performed by: INTERNAL MEDICINE

## 2020-01-09 PROCEDURE — G8400 PT W/DXA NO RESULTS DOC: HCPCS | Performed by: INTERNAL MEDICINE

## 2020-01-09 PROCEDURE — 1090F PRES/ABSN URINE INCON ASSESS: CPT | Performed by: INTERNAL MEDICINE

## 2020-01-09 PROCEDURE — 99214 OFFICE O/P EST MOD 30 MIN: CPT | Performed by: INTERNAL MEDICINE

## 2020-01-09 PROCEDURE — 1123F ACP DISCUSS/DSCN MKR DOCD: CPT | Performed by: INTERNAL MEDICINE

## 2020-01-09 PROCEDURE — G8428 CUR MEDS NOT DOCUMENT: HCPCS | Performed by: INTERNAL MEDICINE

## 2020-01-09 PROCEDURE — G8484 FLU IMMUNIZE NO ADMIN: HCPCS | Performed by: INTERNAL MEDICINE

## 2020-01-09 PROCEDURE — 4040F PNEUMOC VAC/ADMIN/RCVD: CPT | Performed by: INTERNAL MEDICINE

## 2020-01-09 PROCEDURE — 1036F TOBACCO NON-USER: CPT | Performed by: INTERNAL MEDICINE

## 2020-01-09 RX ORDER — ATORVASTATIN CALCIUM 20 MG/1
20 TABLET, FILM COATED ORAL DAILY
Qty: 30 TABLET | Refills: 3 | Status: SHIPPED | OUTPATIENT
Start: 2020-01-09 | End: 2020-02-07

## 2020-01-09 RX ORDER — PANTOPRAZOLE SODIUM 40 MG/1
40 TABLET, DELAYED RELEASE ORAL
Qty: 60 TABLET | Refills: 5 | Status: SHIPPED | OUTPATIENT
Start: 2020-01-09 | End: 2021-03-29 | Stop reason: SDUPTHER

## 2020-01-09 ASSESSMENT — ENCOUNTER SYMPTOMS
EYES NEGATIVE: 1
BLOOD IN STOOL: 0
STRIDOR: 0
GASTROINTESTINAL NEGATIVE: 1
WHEEZING: 0
CHEST TIGHTNESS: 0
NAUSEA: 0
COUGH: 0
SHORTNESS OF BREATH: 1

## 2020-01-09 NOTE — PROGRESS NOTES
and leg swelling. Gastrointestinal: Negative. Negative for blood in stool and nausea. Genitourinary: Negative. Musculoskeletal: Negative. Skin: Negative. Neurological: Positive for dizziness and light-headedness. Negative for syncope and weakness. Hematological: Negative. Psychiatric/Behavioral: Negative. Physical Examination:    /84 (Site: Left Upper Arm, Position: Sitting, Cuff Size: Medium Adult)   Pulse 100   Resp 18   Wt 153 lb 9.6 oz (69.7 kg)   SpO2 97%   BMI 25.56 kg/m²    Physical Exam   Constitutional: She appears healthy. No distress. HENT:   Normal cephalic and Atraumatic   Eyes: Pupils are equal, round, and reactive to light. Neck: Normal range of motion and thyroid normal. Neck supple. No JVD present. No neck adenopathy. No thyromegaly present. Cardiovascular: Normal rate, regular rhythm, intact distal pulses and normal pulses. Murmur heard. Pulmonary/Chest: Effort normal and breath sounds normal. She has no wheezes. She has no rales. She exhibits no tenderness. Abdominal: Soft. Bowel sounds are normal. There is no tenderness. Musculoskeletal: Normal range of motion. General: No tenderness or edema. Neurological: She is alert and oriented to person, place, and time. Skin: Skin is warm. No cyanosis. Nails show no clubbing.        LABS:  CBC:   Lab Results   Component Value Date    WBC 6.6 12/26/2019    RBC 4.48 12/26/2019    RBC 4.35 06/01/2012    HGB 13.8 12/26/2019    HCT 40.9 12/26/2019    MCV 91.1 12/26/2019    MCH 30.7 12/26/2019    MCHC 33.7 12/26/2019    RDW 14.2 12/26/2019     12/26/2019    MPV 9.0 09/03/2014     Lipids:  Lab Results   Component Value Date    CHOL 169 09/21/2019    CHOL 105 06/16/2016    CHOL 189 09/02/2014     Lab Results   Component Value Date    TRIG 132 06/16/2016    TRIG 188 09/02/2014    TRIG 192 04/16/2014     Lab Results   Component Value Date    HDL 40 06/16/2016    HDL 49 09/02/2014    HDL 33 (L) 04/16/2014     Lab Results   Component Value Date    LDLCALC 39 06/16/2016    LDLCALC 102 09/02/2014    LDLCALC 101 04/16/2014     No results found for: LABVLDL, VLDL  Lab Results   Component Value Date    CHOLHDLRATIO 4.3 05/31/2012    CHOLHDLRATIO 4.6 05/30/2012     CMP:    Lab Results   Component Value Date     12/26/2019    K 3.9 12/26/2019     12/26/2019    CO2 25 12/26/2019    BUN 11 12/26/2019    CREATININE 0.66 12/26/2019    GFRAA >60.0 12/26/2019    LABGLOM >60.0 12/26/2019    GLUCOSE 102 12/26/2019    GLUCOSE 139 06/01/2012    PROT 6.6 12/26/2019    LABALBU 3.9 12/26/2019    LABALBU 4.1 05/31/2012    CALCIUM 9.0 12/26/2019    BILITOT <0.2 12/26/2019    ALKPHOS 73 12/26/2019    AST 15 12/26/2019    ALT 17 12/26/2019     BMP:    Lab Results   Component Value Date     12/26/2019    K 3.9 12/26/2019     12/26/2019    CO2 25 12/26/2019    BUN 11 12/26/2019    LABALBU 3.9 12/26/2019    LABALBU 4.1 05/31/2012    CREATININE 0.66 12/26/2019    CALCIUM 9.0 12/26/2019    GFRAA >60.0 12/26/2019    LABGLOM >60.0 12/26/2019    GLUCOSE 102 12/26/2019    GLUCOSE 139 06/01/2012     Magnesium:    Lab Results   Component Value Date    MG 2.2 12/26/2019    MG 2.1 05/30/2012     TSH:  Lab Results   Component Value Date    TSH 1.750 09/02/2014       Patient Active Problem List   Diagnosis    Dysphagia    Arterial fibromuscular dysplasia (HCC)    Fibromyalgia    Gastroesophageal reflux disease    Hypertension    Obstructive sleep apnea syndrome    Peripheral neuropathy (HCC)    Varicose veins of lower extremity    Chest pain    BEATRICE (obstructive sleep apnea)    Stenosis of right carotid artery    Dyslipidemia       Medications Discontinued During This Encounter   Medication Reason    esomeprazole (NEXIUM) 40 MG capsule        Modified Medications    No medications on file       Orders Placed This Encounter   Medications    pantoprazole (PROTONIX) 40 MG tablet     Sig: Take 1 tablet by mouth 2

## 2020-01-23 ENCOUNTER — OFFICE VISIT (OUTPATIENT)
Dept: GASTROENTEROLOGY | Age: 75
End: 2020-01-23
Payer: MEDICARE

## 2020-01-23 VITALS
TEMPERATURE: 97 F | HEART RATE: 92 BPM | DIASTOLIC BLOOD PRESSURE: 72 MMHG | OXYGEN SATURATION: 97 % | HEIGHT: 65 IN | WEIGHT: 153 LBS | SYSTOLIC BLOOD PRESSURE: 130 MMHG | BODY MASS INDEX: 25.49 KG/M2

## 2020-01-23 PROCEDURE — G8400 PT W/DXA NO RESULTS DOC: HCPCS | Performed by: INTERNAL MEDICINE

## 2020-01-23 PROCEDURE — 1090F PRES/ABSN URINE INCON ASSESS: CPT | Performed by: INTERNAL MEDICINE

## 2020-01-23 PROCEDURE — 4040F PNEUMOC VAC/ADMIN/RCVD: CPT | Performed by: INTERNAL MEDICINE

## 2020-01-23 PROCEDURE — 1036F TOBACCO NON-USER: CPT | Performed by: INTERNAL MEDICINE

## 2020-01-23 PROCEDURE — G8427 DOCREV CUR MEDS BY ELIG CLIN: HCPCS | Performed by: INTERNAL MEDICINE

## 2020-01-23 PROCEDURE — G8484 FLU IMMUNIZE NO ADMIN: HCPCS | Performed by: INTERNAL MEDICINE

## 2020-01-23 PROCEDURE — G8417 CALC BMI ABV UP PARAM F/U: HCPCS | Performed by: INTERNAL MEDICINE

## 2020-01-23 PROCEDURE — 99205 OFFICE O/P NEW HI 60 MIN: CPT | Performed by: INTERNAL MEDICINE

## 2020-01-23 PROCEDURE — 1123F ACP DISCUSS/DSCN MKR DOCD: CPT | Performed by: INTERNAL MEDICINE

## 2020-01-23 RX ORDER — NORTRIPTYLINE HYDROCHLORIDE 10 MG/1
10 CAPSULE ORAL NIGHTLY
Qty: 30 CAPSULE | Refills: 3 | Status: SHIPPED | OUTPATIENT
Start: 2020-01-23 | End: 2020-01-30

## 2020-01-23 RX ORDER — WHEAT DEXTRIN 3 G/3.8 G
POWDER (GRAM) ORAL
Qty: 1 CAN | Refills: 3 | Status: SHIPPED | OUTPATIENT
Start: 2020-01-23 | End: 2022-01-19

## 2020-01-23 NOTE — PROGRESS NOTES
Gastroenterology Clinic Visit    Aarti Cain  [id number]  Chief Complaint   Patient presents with    Consultation     HPI: 66 y.o. female presents to the clinic with constellation of GI symptoms including dysphagia, irritable bowel syndrome with alternating diarrhea and constipation. Diarrhea being the predominant symptom. Her main concern for the visit today is dysphagia. Detailed review of her old medical records including care everywhere shows that patient has had extensive investigation for dysphagia in the past.  On her visit to the clinic today she reports the symptoms being present for last 6 months however review of her record appears to show that she has had esophageal testing for more than few years. 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 Review of care everywhere shows visits with Dr. Cristobal Rubi at [facility], however clinic notes not visible, she underwent a manometry study in July 2019. She has been under Dr. Suzan Morton care for a long time prior to this    Prior clinic notes: Reviewed in care everywhere and summarized below:  11/20/2015: Dr. Suzan Morton:  65year old patient returns to clinic today to discuss issues with dysphagia/odynophagia. Since last office visit, she had EGD with esophageal dilation, and placement of Bravo pH probe at [facility] (10/20/2015). Bravo study was normal, without significant reflux on days 1 or 2. Assessment/Plan: 65year-old patient with multiple complaints some GI related and some not that appeared to be associated with stress and anxiety and have no obvious explanation. Her GI workup has been extensive and unremarkable. I advised her today to start a trial of amitriptyline 10 mg at bedtime. She does have trouble sleeping and she complains of nausea dysphagia and odynophagia and dyspepsia. She will report her response to this medication in 4 weeks.     10/2/2015:  neurology consult. Had EGD 5/2014 with Dr. Marcus Zarco, showing [facility], but was otherwise normal  Colonoscopy 2012, with recommendation to repeat in 5 years  Family history positive for CRC in grandfather (elderly dx), sister with Crohn's  A/P  -Will switch to Dexilant 60mg daily before breakfast d/t failed therapy with mult PPIs in past  -Reinforce GERD lifestyle modificaitons  -Encouraged eating small, frequent meals throughout the day, chewing food well prior to swallowing   -Follow up with Dr. Thee Bansal after neurology consult in approx 6-8 weeks  -If no neurologic cause of her dysphagia is identified, may consider repeating EGD +/- dilation in future    9/2014: Dr. Thee Bansal:  HPI: 68 y.o. Female presenting for evaluation regarding abodominal pain for one month. Reports variable abdominal pain is primarily in the RUQ today, after eating has more of a generalized abdominal pain. States pain is achey and sharp, \"feels like something is twisting in there\" Pain has been constant today, is excerbated by eating, has pain immediately after consuming solids and liquids. Has radiation of pain into the back at times, \"especially if I try to eat a meal.\" Associated symptoms include intermittent nausea, feels this symptom is more prominent when she does not eat. Is currently using BID Nexium and reports daily breakthrough heartburn off and on throughout the day. Is taking four 81mg ASA for vascular disease. Denies vomiting, last episode was sept 2, 2014 associated with ED visit. Notes she has dry mouth and has to drink fluids when eating, denies odynophagia and dysphagia. Reports changes to bowel habits, constipation. Frequency is once every 2-3 days. Recently was told to stay on a BRAT diet and notes this caused worsening constipation, has pain with bowel movements. Denies hematochezia, BRBPR, and melena. C/o sweats, denies fevers and chills. Is not using miralax as listed in chart.  Is not using any OTC medication for not appear to be consistent in its clinical description with vascular ischemia. She is otherwise doing well. Recent endoscopy for dysphagia was unremarkable. Modified barium swallow was also normal.  Assessment/Plan: 68-year-old lady with complete occlusion of the GILLES probably at this point of no clinical significance in light of the complete patency of the superior mesenteric artery. She does not have evidence of significant atherosclerosis. Her celiac disease is extrinsically compressed but not occluded by any intrinsic thrombus or plaques. The duration of her Coumadin therapy he should probably be sure to not more than 3 months however I would leave that to vascular service to decide. There is no need for significant GI intervention. At some point in time the patient may benefit from repeat colonoscopy. 12/2011: Dr. 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 She underwent stenting of that artery along with thrombolytic therapy. She has been doing well. She recently has complained of worsening heartburn. She also complains of some recurrent abdominal pain not dissimilar from her prior pain although much less frequently. She unfortunately cannot have any magnetic resonance imaging view to hearing device. She denies any weight loss. She does complain of left-sided chest pain. She had EGD and colonoscopy in the fall of 2010. Both of these were normal.    Assessment/Plan: 68-year-old lady with history of mesenteric artery thrombosis and recent complain of abdominal pain and chest pain. A component to her pain appear to be associated with reflux. I cannot rule out abdominal angina.  Because of the history of inability to have MRI we will proceed with prior GI work-up:    #Procedure: Esophageal Manometry Examination Date: 07/15/2019    Lower Esophageal Sphincter Region Normal    Landmarks        Proximal LES (from nares)(cm) 42.6        LES length(cm) 0.8 2.7-4.8       Esophageal length (LES-UES centers)(cm) 24.3        Intraabdominal LES length(cm) 0.0        Hiatal hernia? Yes, 1.2     LES Pressures        Pressure faustina. method eSleeve,IRP        Basal (respiratory mean)(mmHg) -1.8 13-43       Residual (median)(mmHg) -9.0 <15.0        Upper Esophageal Sphincter Normal    Mean basal pressure(mmHg) 26.2     Mean residual pressure(mmHg) 6.8 <12.0        Esophageal Motility Normal    Number of swallows evaluated 10     Webster Classification        % failed 0        % weak 0        % ineffective 0        % panesophageal pressurization 0        % premature contraction 0        % fragmented 20        % intact 80        Number of hypercontractile swallows 0         Pharyngeal / UES Motility Normal    No. swallows evaluated 10     Evaluated @ 2.0 & 3.0 above UES        Mean peak pressure(mmHg) 24.9         Interpretation / Findings:   --Normal LESP and complete relaxation. --Normal peristalsis in all swallows. Impressions: Normal Esophageal manometry    #EGD: 7/2019  Normal esophagus. Biopsied.                       - Z-line regular, 40 cm from the incisors.                       - LA Grade A reflux esophagitis.                     - 3 cm hiatal hernia.                       - Normal stomach. Biopsied.                       - Normal examined duodenum. Biopsied    #Gastric emptying study: 6/11/2019: Normal    #Modified barium swallow: 3/27/2019: Mild oral and pharyngeal dysphagia    #Small bowel GI series: June 2018: Normal small bowel series with contrast entering the colon in 60 minutes. No inflammation of the small bowel is seen. #CT abdomen: 4/21/2018: Multiple prominent fluid-filled loops of small bowel with a few scattered air-fluid levels. Distal small bowel is decompressed. Findings may represent that of a partial small bowel obstruction. #Colonoscopy: 4/3/2017: Sigmoid diverticulosis otherwise normal    #Mesentric artery duplex: 9/20/2016  IMPRESSION: Compared to prior study of 1/5/2016, no change. AORTA: Aorta plaque noted without evidence of hemodynamically significant stenosis at distal  MESENTERIC VESSELS:  Celiac: Dynamic elevated velocities due to median arcuate ligament compression. Hepatic: Patent. Splenic: Patent. Superior mesenteric artery: 0-69% stenosis. No evidence of hemodynamically significant stenosis. Inferior mesenteric artery: 0-69% stenosis. No evidence of hemodynamically significant stenosis. #EGD: 5/19/2014: Hiatal hernia otherwise normal  #Colonoscopy: 12/10/2012: Sigmoid diverticulosis, otherwise normal    Assessmentand Plan:  66 y.o. female with clinical history consistent with functional gastrointestinal disorder. Patient with extensive investigation for symptoms of dysphagia as summarized above. Patient also with failure to respond to multiple medical meds and dilation in the past.  Additionally patient has irritable bowel syndrome with alternating diarrhea and constipation, diarrhea being the predominant symptom. 1. Esophageal dysphagia  -Patient advised to re-follow with speech therapy  -Start Pamelor 10 mg nightly and evaluate symptoms    2. Irritable bowel syndrome with both constipation and diarrhea  3. Gastrointestinal functional disorder  - Lifestyle modification with stress reduction, relaxation techniques discussed in detail.  - Dietary changes discussed  - Fiber supplementation, issue with fiber increasing gas and bloating discussed  Rx provided for  - nortriptyline (PAMELOR) 10 MG capsule; Take 1 capsule by mouth nightly  Dispense: 30 capsule;  Refill: 3  - Wheat Dextrin (BENEFIBER) POWD; Start with 2 to 3 tsp daily with 8 oz of water, titrate based on stool consistency, titrate down based on bloating & abdominal cramps    If tolerates Pamelor, increase dose to 25 mg in 2 to 4 weeks    > 50% of 50 minutes was spent spent on counseling, coordinating care based on my plan and assessment as noted above. Return in about 2 months (around 3/23/2020). Carl Jackson MD   Staff Gastroenterologist  [facility]    Please note this report has been partially produced using speech recognition software and may cause contain errors related to thatsystem including grammar, punctuation and spelling as well as words and phrases that may seem inappropriate. If there are questions or concerns please feel free to contact me to clarify.

## 2020-01-24 ENCOUNTER — HOSPITAL ENCOUNTER (OUTPATIENT)
Dept: NON INVASIVE DIAGNOSTICS | Age: 75
Discharge: HOME | End: 2020-01-24
Payer: MEDICARE

## 2020-01-24 ENCOUNTER — HOSPITAL ENCOUNTER (OUTPATIENT)
Dept: NUCLEAR MEDICINE | Age: 75
Discharge: HOME | End: 2020-01-26
Payer: MEDICARE

## 2020-01-24 ENCOUNTER — HOSPITAL ENCOUNTER (OUTPATIENT)
Dept: ULTRASOUND IMAGING | Age: 75
Discharge: HOME | End: 2020-01-26
Payer: MEDICARE

## 2020-01-24 VITALS — BODY MASS INDEX: 24.99 KG/M2 | WEIGHT: 150 LBS | HEIGHT: 65 IN

## 2020-01-24 LAB
LV EF: 60 %
LVEF MODALITY: NORMAL

## 2020-01-24 PROCEDURE — 3430000000 HC RX DIAGNOSTIC RADIOPHARMACEUTICAL: Performed by: INTERNAL MEDICINE

## 2020-01-24 PROCEDURE — 93880 EXTRACRANIAL BILAT STUDY: CPT | Performed by: INTERNAL MEDICINE

## 2020-01-24 PROCEDURE — 93880 EXTRACRANIAL BILAT STUDY: CPT

## 2020-01-24 PROCEDURE — A9502 TC99M TETROFOSMIN: HCPCS | Performed by: INTERNAL MEDICINE

## 2020-01-24 PROCEDURE — 93306 TTE W/DOPPLER COMPLETE: CPT

## 2020-01-24 PROCEDURE — 93017 CV STRESS TEST TRACING ONLY: CPT

## 2020-01-24 PROCEDURE — 6360000002 HC RX W HCPCS: Performed by: INTERNAL MEDICINE

## 2020-01-24 PROCEDURE — 78452 HT MUSCLE IMAGE SPECT MULT: CPT

## 2020-01-24 PROCEDURE — 93018 CV STRESS TEST I&R ONLY: CPT | Performed by: INTERNAL MEDICINE

## 2020-01-24 PROCEDURE — 2580000003 HC RX 258: Performed by: INTERNAL MEDICINE

## 2020-01-24 RX ORDER — SODIUM CHLORIDE 0.9 % (FLUSH) 0.9 %
10 SYRINGE (ML) INJECTION PRN
Status: COMPLETED | OUTPATIENT
Start: 2020-01-24 | End: 2020-01-24

## 2020-01-24 RX ADMIN — TETROFOSMIN 11.5 MILLICURIE: 1.38 INJECTION, POWDER, LYOPHILIZED, FOR SOLUTION INTRAVENOUS at 09:54

## 2020-01-24 RX ADMIN — TETROFOSMIN 35.3 MILLICURIE: 1.38 INJECTION, POWDER, LYOPHILIZED, FOR SOLUTION INTRAVENOUS at 11:50

## 2020-01-24 RX ADMIN — Medication 10 ML: at 09:48

## 2020-01-24 RX ADMIN — REGADENOSON 0.4 MG: 0.08 INJECTION, SOLUTION INTRAVENOUS at 11:50

## 2020-01-24 RX ADMIN — Medication 10 ML: at 11:50

## 2020-01-24 RX ADMIN — Medication 10 ML: at 11:51

## 2020-01-24 NOTE — PROCEDURES
[facility], [address] [facility]                              CARDIAC STRESS TEST    PATIENT NAME: Jonathan Carter                   :        1945  MED REC NO:   [id number]                            ROOM:  ACCOUNT NO:   [de-identified]                           ADMIT DATE: 2020  PROVIDER:     Paddy Li MD    CARDIOVASCULAR DIAGNOSTIC DEPARTMENT    DATE OF STUDY:  2020    LEXISCAN    ORDERING PROVIDERS:  Coralie Najjar, MD and Dr. Isai Campos. INDICATIONS:  Chest pain. TECHNIQUE:  At rest, the patient was injected with 11.5 mCi of Myoview. Resting images were obtained. The patient was then given 0.4 mg of  Lexiscan followed by administration of 35.3 mCi of Myoview. Stress  tomographic images were then obtained. Left ventricular ejection  fraction and gated wall motion were acquired. RESULTS:  Resting EKG revealed sinus rhythm normal limits. Maximum  heart rate achieved was 93 beats per minute. No diagnostic ST-segment  changes were noted. IMAGING RESULTS:  Review of rest and stress tomographic images revealed  homogenous myocardial perfusion with no evidence of prior myocardial  infarction or ischemia. Left ventricular ejection fraction is 78%. TID  ratio is 0.9, which is within normal limits. IMPRESSION:  1. Homogenous myocardial perfusion with no evidence of prior myocardial  infarction or ischemia. 2.  Normal left ventricular ejection fraction. 3.  Normal TID ratio.         Tori House MD    D: 2020 #15:50:51       T: 2020 16:09:39     [email or web address]  Job#: [id number]     Doc#: [id number]    CC:

## 2020-01-24 NOTE — PROGRESS NOTES
Hx,allergies and medications reviewed. Patient was very unstable on treadmill. For patients safety we will change exam to Narciso Tuttle. [address] here. Injected patient with [place] and Myoview. Tolerated procedure well. SOB and jaw discomfort reported. Returned to baseline in recovery. Denied chest pain or pressure. EKG shows no ectopy.

## 2020-01-30 ENCOUNTER — OFFICE VISIT (OUTPATIENT)
Dept: CARDIOLOGY CLINIC | Age: 75
End: 2020-01-30
Payer: MEDICARE

## 2020-01-30 VITALS
DIASTOLIC BLOOD PRESSURE: 82 MMHG | OXYGEN SATURATION: 98 % | BODY MASS INDEX: 25.73 KG/M2 | HEART RATE: 72 BPM | WEIGHT: 154.6 LBS | SYSTOLIC BLOOD PRESSURE: 130 MMHG | RESPIRATION RATE: 16 BRPM

## 2020-01-30 PROBLEM — R42 DIZZY: Status: ACTIVE | Noted: 2020-01-30

## 2020-01-30 PROBLEM — G47.33 OSA ON CPAP: Status: ACTIVE | Noted: 2020-01-30

## 2020-01-30 PROBLEM — I10 ESSENTIAL HYPERTENSION: Status: ACTIVE | Noted: 2020-01-30

## 2020-01-30 PROBLEM — Z99.89 OSA ON CPAP: Status: ACTIVE | Noted: 2020-01-30

## 2020-01-30 PROCEDURE — G8427 DOCREV CUR MEDS BY ELIG CLIN: HCPCS | Performed by: INTERNAL MEDICINE

## 2020-01-30 PROCEDURE — 1123F ACP DISCUSS/DSCN MKR DOCD: CPT | Performed by: INTERNAL MEDICINE

## 2020-01-30 PROCEDURE — G8400 PT W/DXA NO RESULTS DOC: HCPCS | Performed by: INTERNAL MEDICINE

## 2020-01-30 PROCEDURE — 1036F TOBACCO NON-USER: CPT | Performed by: INTERNAL MEDICINE

## 2020-01-30 PROCEDURE — 99214 OFFICE O/P EST MOD 30 MIN: CPT | Performed by: INTERNAL MEDICINE

## 2020-01-30 PROCEDURE — G8484 FLU IMMUNIZE NO ADMIN: HCPCS | Performed by: INTERNAL MEDICINE

## 2020-01-30 PROCEDURE — 1090F PRES/ABSN URINE INCON ASSESS: CPT | Performed by: INTERNAL MEDICINE

## 2020-01-30 PROCEDURE — G8417 CALC BMI ABV UP PARAM F/U: HCPCS | Performed by: INTERNAL MEDICINE

## 2020-01-30 PROCEDURE — 4040F PNEUMOC VAC/ADMIN/RCVD: CPT | Performed by: INTERNAL MEDICINE

## 2020-01-30 ASSESSMENT — ENCOUNTER SYMPTOMS
SHORTNESS OF BREATH: 1
NAUSEA: 0
GASTROINTESTINAL NEGATIVE: 1
EYES NEGATIVE: 1
STRIDOR: 0
CHEST TIGHTNESS: 0
WHEEZING: 0
COUGH: 0
BLOOD IN STOOL: 0

## 2020-01-30 NOTE — PROGRESS NOTES
Physical activity:     Days per week: None     Minutes per session: None    Stress: None   Relationships    Social connections:     Talks on phone: None     Gets together: None     Attends Baptism service: None     Active member of club or organization: None     Attends meetings of clubs or organizations: None     Relationship status: None    Intimate partner violence:     Fear of current or ex partner: None     Emotionally abused: None     Physically abused: None     Forced sexual activity: None   Other Topics Concern    None   Social History Narrative    None       Allergies   Allergen Reactions    Latex     Amoxicillin-Pot Clavulanate      Other reaction(s): Vomiting    Carbidopa-Levodopa      Other reaction(s): Unknown  Effects the eyes    Iv [Iodides]     Morphine Other (See Comments)     Made her go 'berserk '    Nitrofurantoin Other (See Comments)     Myalgia    Plavix [Clopidogrel Bisulfate]     Pravastatin Other (See Comments)     Myalgia       Current Outpatient Medications   Medication Sig Dispense Refill    Wheat Dextrin (Annette Alaniz) POWD Start with 2 to 3 tsp daily with 8 oz of water, titrate based on stool consistency, titrate down based on bloating & abdominal cramps 1 Can 3    pantoprazole (PROTONIX) 40 MG tablet Take 1 tablet by mouth 2 times daily (before meals) 60 tablet 5    atorvastatin (LIPITOR) 20 MG tablet Take 1 tablet by mouth daily 30 tablet 3    oxymetazoline (12 HOUR NASAL SPRAY) 0.05 % nasal spray 2 sprays by Nasal route 2 times daily 1 Bottle 3    aspirin 325 MG tablet Take 325 mg by mouth daily        No current facility-administered medications for this visit. Review of Systems:   Review of Systems   Constitutional: Positive for fatigue. Negative for diaphoresis. HENT: Negative. Eyes: Negative. Respiratory: Positive for shortness of breath. Negative for cough, chest tightness, wheezing and stridor. Cardiovascular: Positive for chest pain.  Negative file       No orders of the defined types were placed in this encounter. Assessment/Plan:    1. Essential hypertension   stable   And we will dc Low dose Losartan 25 given Postional dizizness. 2. Chest pain, unspecified type     3. BEATRICE (obstructive sleep apnea)  Will need Pul referral     4. Stenosis of right carotid artery    5. Dyslipidemia  Add Atorvastatin     6. GERD- try Protonix 40 bid - much improved. Counseling:  Heart Healthy Lifestyle, Low Salt Diet, Take Precautions to Prevent Falls and Walk Daily    Return in about 6 months (around 7/30/2020) for Cardiovascular care. .      Electronically signed by Kj Gay MD on 1/30/2020 at 2:25 PM

## 2020-02-07 ENCOUNTER — OFFICE VISIT (OUTPATIENT)
Dept: FAMILY MEDICINE CLINIC | Age: 75
End: 2020-02-07
Payer: MEDICARE

## 2020-02-07 VITALS
SYSTOLIC BLOOD PRESSURE: 122 MMHG | OXYGEN SATURATION: 98 % | HEART RATE: 88 BPM | BODY MASS INDEX: 25.56 KG/M2 | TEMPERATURE: 96.1 F | WEIGHT: 153.6 LBS | DIASTOLIC BLOOD PRESSURE: 82 MMHG

## 2020-02-07 PROCEDURE — G8427 DOCREV CUR MEDS BY ELIG CLIN: HCPCS | Performed by: INTERNAL MEDICINE

## 2020-02-07 PROCEDURE — 1090F PRES/ABSN URINE INCON ASSESS: CPT | Performed by: INTERNAL MEDICINE

## 2020-02-07 PROCEDURE — 99213 OFFICE O/P EST LOW 20 MIN: CPT | Performed by: INTERNAL MEDICINE

## 2020-02-07 PROCEDURE — G8417 CALC BMI ABV UP PARAM F/U: HCPCS | Performed by: INTERNAL MEDICINE

## 2020-02-07 PROCEDURE — 1036F TOBACCO NON-USER: CPT | Performed by: INTERNAL MEDICINE

## 2020-02-07 PROCEDURE — G8400 PT W/DXA NO RESULTS DOC: HCPCS | Performed by: INTERNAL MEDICINE

## 2020-02-07 PROCEDURE — 4040F PNEUMOC VAC/ADMIN/RCVD: CPT | Performed by: INTERNAL MEDICINE

## 2020-02-07 PROCEDURE — G8484 FLU IMMUNIZE NO ADMIN: HCPCS | Performed by: INTERNAL MEDICINE

## 2020-02-07 PROCEDURE — 1123F ACP DISCUSS/DSCN MKR DOCD: CPT | Performed by: INTERNAL MEDICINE

## 2020-02-07 ASSESSMENT — ENCOUNTER SYMPTOMS
RHINORRHEA: 0
ABDOMINAL DISTENTION: 0
FACIAL SWELLING: 0
EYE REDNESS: 0
BACK PAIN: 0
COLOR CHANGE: 0
EYE ITCHING: 0
CONSTIPATION: 0
COUGH: 0
SORE THROAT: 0
SHORTNESS OF BREATH: 0
DIARRHEA: 0
CHEST TIGHTNESS: 0
ABDOMINAL PAIN: 0
PHOTOPHOBIA: 0
RECTAL PAIN: 0
NAUSEA: 0
EYE DISCHARGE: 0
SINUS PRESSURE: 0
VOICE CHANGE: 0
SINUS PAIN: 0
WHEEZING: 0
APNEA: 0
EYE PAIN: 0
VOMITING: 0
TROUBLE SWALLOWING: 0
BLOOD IN STOOL: 0

## 2020-02-07 NOTE — PROGRESS NOTES
2020    Yanci Suh (:  1945) is a 66 y.o. female, here for evaluation of the following medical concerns:chest pain    HPI  49-year-old female presents with a complaint of cough, maxillary sinus congestion, subjective fevers and associated facial pain. She reports that the symptoms are congruent with prior episodes of sinusitis/bronchitis. Diarrhea: The patient additionally reports experiencing chronic diarrhea. She denies additional gastrointestinal symptoms. Decreased hearing: The patient would like referral to ENT for management of bilateral ear discomfort following placement of hearing aids. The ASCVD Risk score (Sully Burnett, et al., 2013) failed to calculate for the following reasons:    Cannot find a previous HDL lab      Review of Systems   Constitutional: Negative for chills, diaphoresis, fatigue and fever. HENT: Negative for congestion, dental problem, drooling, ear discharge, ear pain, facial swelling, hearing loss, mouth sores, nosebleeds, postnasal drip, rhinorrhea, sinus pressure, sinus pain, sneezing, sore throat, tinnitus, trouble swallowing and voice change. Eyes: Negative for photophobia, pain, discharge, redness, itching and visual disturbance. Respiratory: Negative for apnea, cough, chest tightness, shortness of breath and wheezing. Cardiovascular: Negative for chest pain, palpitations and leg swelling. Gastrointestinal: Negative for abdominal distention, abdominal pain, blood in stool, constipation, diarrhea, nausea, rectal pain and vomiting. Endocrine: Negative for cold intolerance, heat intolerance, polydipsia, polyphagia and polyuria. Genitourinary: Negative for decreased urine volume, difficulty urinating, dysuria, flank pain, frequency, genital sores, hematuria and urgency. Musculoskeletal: Negative for arthralgias, back pain, gait problem, joint swelling, myalgias, neck pain and neck stiffness.    Skin: Negative for color change, rash and wound. Allergic/Immunologic: Negative for environmental allergies and food allergies. Neurological: Negative for dizziness, tremors, seizures, syncope, facial asymmetry, speech difficulty, weakness, light-headedness, numbness and headaches. Hematological: Negative for adenopathy. Does not bruise/bleed easily. Psychiatric/Behavioral: Negative for agitation, confusion, decreased concentration, hallucinations, self-injury, sleep disturbance and suicidal ideas. The patient is not nervous/anxious. Prior to Visit Medications    Medication Sig Taking?  Authorizing Provider   Wheat Dextrin (Natty Tomás) POWD Start with 2 to 3 tsp daily with 8 oz of water, titrate based on stool consistency, titrate down based on bloating & abdominal cramps Yes Carl Jackson MD   pantoprazole (PROTONIX) 40 MG tablet Take 1 tablet by mouth 2 times daily (before meals) Yes Amina Sinclair MD   atorvastatin (LIPITOR) 20 MG tablet Take 1 tablet by mouth daily Yes Amina Sinclair MD   oxymetazoline (12 HOUR NASAL SPRAY) 0.05 % nasal spray 2 sprays by Nasal route 2 times daily Yes Kamala Guevara MD   aspirin 325 MG tablet Take 325 mg by mouth daily  Yes Historical Provider, MD        Allergies   Allergen Reactions    Latex     Amoxicillin-Pot Clavulanate      Other reaction(s): Vomiting    Carbidopa-Levodopa      Other reaction(s): Unknown  Effects the eyes    Iv [Iodides]     Morphine Other (See Comments)     Made her go 'berserk '    Nitrofurantoin Other (See Comments)     Myalgia    Plavix [Clopidogrel Bisulfate]     Pravastatin Other (See Comments)     Myalgia       Past Medical History:   Diagnosis Date    Anticoagulant long-term use     Carotid artery stenosis     Family history of early CAD 6/28/2016    Fibromyalgia     GERD (gastroesophageal reflux disease)     Hypertension     Neuropathy     Sleep apnea     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 myelopathy or radiculopathy        No past surgical history on

## 2020-02-13 ENCOUNTER — OFFICE VISIT (OUTPATIENT)
Dept: FAMILY MEDICINE CLINIC | Age: 75
End: 2020-02-13
Payer: MEDICARE

## 2020-02-13 VITALS
HEART RATE: 76 BPM | TEMPERATURE: 96.7 F | BODY MASS INDEX: 25.33 KG/M2 | SYSTOLIC BLOOD PRESSURE: 120 MMHG | DIASTOLIC BLOOD PRESSURE: 84 MMHG | WEIGHT: 152.2 LBS | OXYGEN SATURATION: 97 %

## 2020-02-13 PROCEDURE — G8400 PT W/DXA NO RESULTS DOC: HCPCS | Performed by: INTERNAL MEDICINE

## 2020-02-13 PROCEDURE — G8417 CALC BMI ABV UP PARAM F/U: HCPCS | Performed by: INTERNAL MEDICINE

## 2020-02-13 PROCEDURE — G8428 CUR MEDS NOT DOCUMENT: HCPCS | Performed by: INTERNAL MEDICINE

## 2020-02-13 PROCEDURE — 99213 OFFICE O/P EST LOW 20 MIN: CPT | Performed by: INTERNAL MEDICINE

## 2020-02-13 PROCEDURE — 1090F PRES/ABSN URINE INCON ASSESS: CPT | Performed by: INTERNAL MEDICINE

## 2020-02-13 PROCEDURE — 1123F ACP DISCUSS/DSCN MKR DOCD: CPT | Performed by: INTERNAL MEDICINE

## 2020-02-13 PROCEDURE — G8482 FLU IMMUNIZE ORDER/ADMIN: HCPCS | Performed by: INTERNAL MEDICINE

## 2020-02-13 PROCEDURE — 1036F TOBACCO NON-USER: CPT | Performed by: INTERNAL MEDICINE

## 2020-02-13 PROCEDURE — 4040F PNEUMOC VAC/ADMIN/RCVD: CPT | Performed by: INTERNAL MEDICINE

## 2020-02-13 RX ORDER — DIPHENOXYLATE HYDROCHLORIDE AND ATROPINE SULFATE 2.5; .025 MG/1; MG/1
1 TABLET ORAL 4 TIMES DAILY PRN
Qty: 42 TABLET | Refills: 0 | Status: SHIPPED | OUTPATIENT
Start: 2020-02-13 | End: 2020-02-17

## 2020-02-13 ASSESSMENT — ENCOUNTER SYMPTOMS
EYE ITCHING: 0
APNEA: 0
SINUS PAIN: 0
CONSTIPATION: 0
ABDOMINAL PAIN: 0
FACIAL SWELLING: 0
COUGH: 0
EYE REDNESS: 0
EYE PAIN: 0
PHOTOPHOBIA: 0
NAUSEA: 0
COLOR CHANGE: 0
WHEEZING: 0
RHINORRHEA: 0
CHEST TIGHTNESS: 0
EYE DISCHARGE: 0
DIARRHEA: 0
ABDOMINAL DISTENTION: 0
RECTAL PAIN: 0
SHORTNESS OF BREATH: 0
SORE THROAT: 0
BACK PAIN: 0
SINUS PRESSURE: 0
VOICE CHANGE: 0
VOMITING: 0
BLOOD IN STOOL: 0
TROUBLE SWALLOWING: 0

## 2020-02-13 NOTE — PROGRESS NOTES
2020    Rojas Jose (:  1945) is a 66 y.o. female, here for evaluation of the following medical concerns:chest pain    HPI  53-year-old female who was recently evaluated for chest pain, decreased hearing and diarrhea presents for follow-up visit. Presents       Diarrhea: The patient has been experiencing diarrhea for several months. She was recently evaluated by gastroenterology, however she states that this complaint was not addressed at that time. She has an appointment scheduled with an alternative gastroenterologist.        Decreased hearing: The patient was previously referred and evaluated by ENT. Pt is scheduled for a swallow test.    The patient also reports gait instability. She states that she has been losing her balance intermittently. In the past she is receiving physical therapy to address this concern. At present he denies polyuria,  Polydipsia, constitutional, sinus, visual, cardiopulmonary, urologic, additional gastrointestinal, immunologic/hematologic, musculoskeletal, neurologic,dermatologic, or psychiatric complaints. The ASCVD Risk score (Андрей Pool, et al., 2013) failed to calculate for the following reasons:    Cannot find a previous HDL lab      Review of Systems   Constitutional: Negative for chills, diaphoresis, fatigue and fever. HENT: Negative for congestion, dental problem, drooling, ear discharge, ear pain, facial swelling, hearing loss, mouth sores, nosebleeds, postnasal drip, rhinorrhea, sinus pressure, sinus pain, sneezing, sore throat, tinnitus, trouble swallowing and voice change. Eyes: Negative for photophobia, pain, discharge, redness, itching and visual disturbance. Respiratory: Negative for apnea, cough, chest tightness, shortness of breath and wheezing. Cardiovascular: Negative for chest pain, palpitations and leg swelling.    Gastrointestinal: Negative for abdominal distention, abdominal pain, blood in stool, constipation, diarrhea, History:   Diagnosis Date    Anticoagulant long-term use     Carotid artery stenosis     Family history of early CAD 6/28/2016    Fibromyalgia     GERD (gastroesophageal reflux disease)     Hypertension     Neuropathy     Sleep apnea     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 myelopathy or radiculopathy        No past surgical history on file. Social History     Socioeconomic History    Marital status:      Spouse name: Not on file    Number of children: Not on file    Years of education: Not on file    Highest education level: Not on file   Occupational History    Not on file   Social Needs    Financial resource strain: Not on file    Food insecurity:     Worry: Not on file     Inability: Not on file    Transportation needs:     Medical: Not on file     Non-medical: Not on file   Tobacco Use    Smoking status: Never Smoker    Smokeless tobacco: Never Used   Substance and Sexual Activity    Alcohol use: No    Drug use: Not on file    Sexual activity: Not on file   Lifestyle    Physical activity:     Days per week: Not on file     Minutes per session: Not on file    Stress: Not on file   Relationships    Social connections:     Talks on phone: Not on file     Gets together: Not on file     Attends Orthodoxy service: Not on file     Active member of club or organization: Not on file     Attends meetings of clubs or organizations: Not on file     Relationship status: Not on file    Intimate partner violence:     Fear of current or ex partner: Not on file     Emotionally abused: Not on file     Physically abused: Not on file     Forced sexual activity: Not on file   Other Topics Concern    Not on file   Social History Narrative    Not on file        Family History   Problem Relation Age of Onset    Crohn's Disease Sister     Colon Cancer Neg Hx        There were no vitals filed for this visit.   Estimated body mass index is 25.56 kg/m² as calculated from the following:    Height as

## 2020-02-17 ENCOUNTER — OFFICE VISIT (OUTPATIENT)
Dept: PULMONOLOGY | Age: 75
End: 2020-02-17
Payer: MEDICARE

## 2020-02-17 VITALS
OXYGEN SATURATION: 97 % | DIASTOLIC BLOOD PRESSURE: 84 MMHG | TEMPERATURE: 98.2 F | BODY MASS INDEX: 25.49 KG/M2 | HEART RATE: 80 BPM | WEIGHT: 153 LBS | SYSTOLIC BLOOD PRESSURE: 120 MMHG | HEIGHT: 65 IN

## 2020-02-17 PROCEDURE — 1123F ACP DISCUSS/DSCN MKR DOCD: CPT | Performed by: INTERNAL MEDICINE

## 2020-02-17 PROCEDURE — G8482 FLU IMMUNIZE ORDER/ADMIN: HCPCS | Performed by: INTERNAL MEDICINE

## 2020-02-17 PROCEDURE — G8427 DOCREV CUR MEDS BY ELIG CLIN: HCPCS | Performed by: INTERNAL MEDICINE

## 2020-02-17 PROCEDURE — G8417 CALC BMI ABV UP PARAM F/U: HCPCS | Performed by: INTERNAL MEDICINE

## 2020-02-17 PROCEDURE — 99204 OFFICE O/P NEW MOD 45 MIN: CPT | Performed by: INTERNAL MEDICINE

## 2020-02-17 PROCEDURE — 4040F PNEUMOC VAC/ADMIN/RCVD: CPT | Performed by: INTERNAL MEDICINE

## 2020-02-17 PROCEDURE — 1036F TOBACCO NON-USER: CPT | Performed by: INTERNAL MEDICINE

## 2020-02-17 PROCEDURE — 1090F PRES/ABSN URINE INCON ASSESS: CPT | Performed by: INTERNAL MEDICINE

## 2020-02-17 PROCEDURE — G8400 PT W/DXA NO RESULTS DOC: HCPCS | Performed by: INTERNAL MEDICINE

## 2020-02-17 ASSESSMENT — ENCOUNTER SYMPTOMS
APNEA: 1
COUGH: 0
SORE THROAT: 0
VOICE CHANGE: 0
SINUS PRESSURE: 0
RHINORRHEA: 0
NAUSEA: 0
EYE DISCHARGE: 0
CHEST TIGHTNESS: 0
EYE ITCHING: 0
TROUBLE SWALLOWING: 0
ABDOMINAL PAIN: 0
DIARRHEA: 0
WHEEZING: 0
VOMITING: 0
SHORTNESS OF BREATH: 0

## 2020-02-17 NOTE — PROGRESS NOTES
Subjective:     Melissa Woods is a 66 y.o. female who complains today of:     Chief Complaint   Patient presents with    New Patient     breathing problems and sleep apnea       HPI  Patient has been diagnose wit BEATRICE , she could not use CPAP due to mask leaking. She said she tried last 2 year. She is complaining of snoring and daytime sleepiness and tiredness. Not sure about C/o witness apnea. She does not wakes up with gasping for air. C/o wakes up frequently during sleep. Complaint of morning headache during night . She  does not have restful sleep. She  does not take daily naps. She does not fall asleep while watching TV. She  does not have a complaint of sleepiness while driving. She does not have difficulty falling sleep but  staying asleep  No significant Weight gain in last 6-12 month   No sleep walking , talking or eating   No sleep onset hallucination  No sleep paralysis. No sleep attacks. She has dysphagia and going to have swallow eval tomorrow. Allergies:  Latex; Amoxicillin-pot clavulanate; Carbidopa-levodopa; Iv [iodides]; Morphine; Nitrofurantoin; Plavix [clopidogrel bisulfate]; and Pravastatin  Past Medical History:   Diagnosis Date    Anticoagulant long-term use     Carotid artery stenosis     Family history of early CAD 6/28/2016    Fibromyalgia     GERD (gastroesophageal reflux disease)     Hypertension     Neuropathy     Sleep apnea     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 myelopathy or radiculopathy      History reviewed. No pertinent surgical history.   Family History   Problem Relation Age of Onset    Crohn's Disease Sister     Colon Cancer Neg Hx      Social History     Socioeconomic History    Marital status:      Spouse name: Not on file    Number of children: Not on file    Years of education: Not on file    Highest education level: Not on file   Occupational History    Not on file   Social Needs    Financial resource strain: Not on file  Food insecurity:     Worry: Not on file     Inability: Not on file    Transportation needs:     Medical: Not on file     Non-medical: Not on file   Tobacco Use    Smoking status: Never Smoker    Smokeless tobacco: Never Used   Substance and Sexual Activity    Alcohol use: No    Drug use: Not on file    Sexual activity: Not on file   Lifestyle    Physical activity:     Days per week: Not on file     Minutes per session: Not on file    Stress: Not on file   Relationships    Social connections:     Talks on phone: Not on file     Gets together: Not on file     Attends Pentecostalism service: Not on file     Active member of club or organization: Not on file     Attends meetings of clubs or organizations: Not on file     Relationship status: Not on file    Intimate partner violence:     Fear of current or ex partner: Not on file     Emotionally abused: Not on file     Physically abused: Not on file     Forced sexual activity: Not on file   Other Topics Concern    Not on file   Social History Narrative    Not on file         Review of Systems   Constitutional: Negative for chills, diaphoresis, fatigue and fever. HENT: Negative for congestion, mouth sores, nosebleeds, postnasal drip, rhinorrhea, sinus pressure, sneezing, sore throat, trouble swallowing and voice change. Snoring   Eyes: Negative for discharge, itching and visual disturbance. Respiratory: Positive for apnea. Negative for cough, chest tightness, shortness of breath and wheezing. Cardiovascular: Negative for chest pain, palpitations and leg swelling. Gastrointestinal: Negative for abdominal pain, diarrhea, nausea and vomiting. Genitourinary: Negative for difficulty urinating and hematuria. Musculoskeletal: Negative for arthralgias, joint swelling and myalgias. Skin: Negative for rash. Allergic/Immunologic: Negative for environmental allergies and food allergies.    Neurological: Negative for dizziness, tremors, weakness and headaches. Psychiatric/Behavioral: Positive for sleep disturbance. Negative for behavioral problems. :     Vitals:    02/17/20 1302   BP: 120/84   Pulse: 80   Temp: 98.2 °F (36.8 °C)   SpO2: 97%   Weight: 153 lb (69.4 kg)   Height: 5' 5\" (1.651 m)     Wt Readings from Last 3 Encounters:   02/17/20 153 lb (69.4 kg)   02/13/20 152 lb 3.2 oz (69 kg)   02/07/20 153 lb 9.6 oz (69.7 kg)         Physical Exam  Constitutional:       General: She is not in acute distress. Appearance: She is well-developed. She is not diaphoretic. HENT:      Head: Normocephalic and atraumatic. Nose: Nose normal.   Eyes:      Pupils: Pupils are equal, round, and reactive to light. Neck:      Thyroid: No thyromegaly. Vascular: No JVD. Trachea: No tracheal deviation. Cardiovascular:      Rate and Rhythm: Normal rate and regular rhythm. Heart sounds: No murmur. No friction rub. No gallop. Pulmonary:      Effort: No respiratory distress. Breath sounds: No wheezing or rales. Chest:      Chest wall: No tenderness. Abdominal:      General: There is no distension. Tenderness: There is no abdominal tenderness. There is no rebound. Musculoskeletal: Normal range of motion. Lymphadenopathy:      Cervical: No cervical adenopathy. Skin:     General: Skin is warm and dry. Neurological:      Mental Status: She is alert and oriented to person, place, and time.       Coordination: Coordination normal.         Current Outpatient Medications   Medication Sig Dispense Refill    Wheat Dextrin (BENEFIBER) POWD Start with 2 to 3 tsp daily with 8 oz of water, titrate based on stool consistency, titrate down based on bloating & abdominal cramps 1 Can 3    pantoprazole (PROTONIX) 40 MG tablet Take 1 tablet by mouth 2 times daily (before meals) 60 tablet 5    oxymetazoline (12 HOUR NASAL SPRAY) 0.05 % nasal spray 2 sprays by Nasal route 2 times daily 1 Bottle 3    aspirin 325 MG tablet Take 325 mg by

## 2020-02-18 ENCOUNTER — HOSPITAL ENCOUNTER (OUTPATIENT)
Dept: GENERAL RADIOLOGY | Age: 75
Discharge: HOME | End: 2020-02-20
Payer: MEDICARE

## 2020-02-18 PROCEDURE — 2500000003 HC RX 250 WO HCPCS: Performed by: PHYSICIAN ASSISTANT

## 2020-02-18 PROCEDURE — 74230 X-RAY XM SWLNG FUNCJ C+: CPT

## 2020-02-18 RX ADMIN — BARIUM SULFATE 80 ML: 400 SUSPENSION ORAL at 13:20

## 2020-02-18 RX ADMIN — BARIUM SULFATE 50 ML: 0.81 POWDER, FOR SUSPENSION ORAL at 13:20

## 2020-02-27 ENCOUNTER — OFFICE VISIT (OUTPATIENT)
Dept: FAMILY MEDICINE CLINIC | Age: 75
End: 2020-02-27
Payer: MEDICARE

## 2020-02-27 VITALS
TEMPERATURE: 96.8 F | BODY MASS INDEX: 26.06 KG/M2 | WEIGHT: 156.6 LBS | DIASTOLIC BLOOD PRESSURE: 84 MMHG | HEART RATE: 97 BPM | SYSTOLIC BLOOD PRESSURE: 132 MMHG | OXYGEN SATURATION: 92 %

## 2020-02-27 PROCEDURE — 1036F TOBACCO NON-USER: CPT | Performed by: INTERNAL MEDICINE

## 2020-02-27 PROCEDURE — G8482 FLU IMMUNIZE ORDER/ADMIN: HCPCS | Performed by: INTERNAL MEDICINE

## 2020-02-27 PROCEDURE — 1090F PRES/ABSN URINE INCON ASSESS: CPT | Performed by: INTERNAL MEDICINE

## 2020-02-27 PROCEDURE — G8427 DOCREV CUR MEDS BY ELIG CLIN: HCPCS | Performed by: INTERNAL MEDICINE

## 2020-02-27 PROCEDURE — 1123F ACP DISCUSS/DSCN MKR DOCD: CPT | Performed by: INTERNAL MEDICINE

## 2020-02-27 PROCEDURE — 99213 OFFICE O/P EST LOW 20 MIN: CPT | Performed by: INTERNAL MEDICINE

## 2020-02-27 PROCEDURE — G8400 PT W/DXA NO RESULTS DOC: HCPCS | Performed by: INTERNAL MEDICINE

## 2020-02-27 PROCEDURE — 4040F PNEUMOC VAC/ADMIN/RCVD: CPT | Performed by: INTERNAL MEDICINE

## 2020-02-27 PROCEDURE — G8417 CALC BMI ABV UP PARAM F/U: HCPCS | Performed by: INTERNAL MEDICINE

## 2020-02-27 ASSESSMENT — ENCOUNTER SYMPTOMS
TROUBLE SWALLOWING: 0
WHEEZING: 0
CONSTIPATION: 0
EYE PAIN: 0
EYE DISCHARGE: 0
BACK PAIN: 0
VOMITING: 0
COLOR CHANGE: 0
VOICE CHANGE: 0
RHINORRHEA: 0
ABDOMINAL PAIN: 0
ABDOMINAL DISTENTION: 0
FACIAL SWELLING: 0
EYE ITCHING: 0
APNEA: 0
CHEST TIGHTNESS: 0
SORE THROAT: 0
SHORTNESS OF BREATH: 0
SINUS PRESSURE: 0
BLOOD IN STOOL: 0
DIARRHEA: 0
PHOTOPHOBIA: 0
NAUSEA: 0
COUGH: 0
SINUS PAIN: 0
EYE REDNESS: 0
RECTAL PAIN: 0

## 2020-02-27 NOTE — PROGRESS NOTES
2020    Christi Hardin (:  1945) is a 66 y.o. female, here for evaluation of the following medical concerns:chest pain    HPI  78-year-old female who was recently evaluated for fatigue, chest pain, decreased hearing and diarrhea presents for follow-up visit. Fatigue: The patient reports experiencing fatigue lack of energy. B12, TSH and vitamin D levels were evaluated at [facility] clinic and these are within normal limits. Diarrhea: The patient had been experiencing diarrhea for several months. This has improved since her last visit. She has an appointment scheduled with an alternative gastroenterologist.        Decreased hearing: The patient was previously referred and evaluated by ENT. Pt is scheduled for a swallow test.      The patient also reports gait instability. She states that she has been losing her balance intermittently. In the past she is receiving physical therapy to address this concern. Chest pain:  The chest pain that the patient voiced at the previous hospital visit is stable at this time. At present he denies polyuria,  Polydipsia, constitutional, sinus, visual, cardiopulmonary, urologic, additional gastrointestinal, immunologic/hematologic, musculoskeletal, neurologic,dermatologic, or psychiatric complaints. The ASCVD Risk score (Valetta Pallas., et al., 2013) failed to calculate for the following reasons:    Cannot find a previous HDL lab      Review of Systems   Constitutional: Negative for chills, diaphoresis, fatigue and fever. HENT: Negative for congestion, dental problem, drooling, ear discharge, ear pain, facial swelling, hearing loss, mouth sores, nosebleeds, postnasal drip, rhinorrhea, sinus pressure, sinus pain, sneezing, sore throat, tinnitus, trouble swallowing and voice change. Eyes: Negative for photophobia, pain, discharge, redness, itching and visual disturbance.    Respiratory: Negative for apnea, cough, chest tightness, shortness of

## 2020-05-12 ENCOUNTER — OFFICE VISIT (OUTPATIENT)
Dept: FAMILY MEDICINE CLINIC | Age: 75
End: 2020-05-12
Payer: MEDICARE

## 2020-05-12 VITALS
HEIGHT: 65 IN | TEMPERATURE: 98.1 F | BODY MASS INDEX: 26.66 KG/M2 | DIASTOLIC BLOOD PRESSURE: 80 MMHG | WEIGHT: 160 LBS | RESPIRATION RATE: 14 BRPM | SYSTOLIC BLOOD PRESSURE: 120 MMHG | HEART RATE: 70 BPM | OXYGEN SATURATION: 98 %

## 2020-05-12 DIAGNOSIS — R39.9 UTI SYMPTOMS: ICD-10-CM

## 2020-05-12 LAB
BILIRUBIN, POC: ABNORMAL
BLOOD URINE, POC: 200
CLARITY, POC: CLEAR
COLOR, POC: CLEAR
GLUCOSE URINE, POC: ABNORMAL
KETONES, POC: ABNORMAL
LEUKOCYTE EST, POC: ABNORMAL
NITRITE, POC: ABNORMAL
PH, POC: 6
PROTEIN, POC: ABNORMAL
SPECIFIC GRAVITY, POC: 1.01
UROBILINOGEN, POC: 0.2

## 2020-05-12 PROCEDURE — 1036F TOBACCO NON-USER: CPT | Performed by: NURSE PRACTITIONER

## 2020-05-12 PROCEDURE — 4040F PNEUMOC VAC/ADMIN/RCVD: CPT | Performed by: NURSE PRACTITIONER

## 2020-05-12 PROCEDURE — G8400 PT W/DXA NO RESULTS DOC: HCPCS | Performed by: NURSE PRACTITIONER

## 2020-05-12 PROCEDURE — 1123F ACP DISCUSS/DSCN MKR DOCD: CPT | Performed by: NURSE PRACTITIONER

## 2020-05-12 PROCEDURE — 99213 OFFICE O/P EST LOW 20 MIN: CPT | Performed by: NURSE PRACTITIONER

## 2020-05-12 PROCEDURE — G8427 DOCREV CUR MEDS BY ELIG CLIN: HCPCS | Performed by: NURSE PRACTITIONER

## 2020-05-12 PROCEDURE — 81003 URINALYSIS AUTO W/O SCOPE: CPT | Performed by: NURSE PRACTITIONER

## 2020-05-12 PROCEDURE — 1090F PRES/ABSN URINE INCON ASSESS: CPT | Performed by: NURSE PRACTITIONER

## 2020-05-12 PROCEDURE — G8417 CALC BMI ABV UP PARAM F/U: HCPCS | Performed by: NURSE PRACTITIONER

## 2020-05-12 RX ORDER — SULFAMETHOXAZOLE AND TRIMETHOPRIM 800; 160 MG/1; MG/1
1 TABLET ORAL 2 TIMES DAILY
Qty: 14 TABLET | Refills: 0 | Status: SHIPPED | OUTPATIENT
Start: 2020-05-12 | End: 2020-05-19

## 2020-05-12 ASSESSMENT — ENCOUNTER SYMPTOMS
BACK PAIN: 1
SHORTNESS OF BREATH: 0

## 2020-05-12 NOTE — PROGRESS NOTES
daily (before meals) 60 tablet 5    oxymetazoline (12 HOUR NASAL SPRAY) 0.05 % nasal spray 2 sprays by Nasal route 2 times daily 1 Bottle 3    aspirin 325 MG tablet Take 325 mg by mouth daily        No current facility-administered medications on file prior to visit. Review of Systems   Constitutional: Negative for chills and fever. Respiratory: Negative for shortness of breath. Cardiovascular: Negative for chest pain. Genitourinary: Positive for dysuria, frequency, pelvic pain and urgency. Negative for flank pain, hematuria, vaginal bleeding, vaginal discharge and vaginal pain. Musculoskeletal: Positive for back pain (lower). Objective    Vitals:    05/12/20 1044   BP: 120/80   Pulse: 70   Resp: 14   Temp: 98.1 °F (36.7 °C)   SpO2: 98%   Weight: 160 lb (72.6 kg)   Height: 5' 5\" (1.651 m)       Physical Exam  Vitals signs reviewed. Constitutional:       General: She is not in acute distress. Appearance: She is well-developed. She is not ill-appearing or toxic-appearing. HENT:      Head: Normocephalic and atraumatic. Right Ear: Hearing and external ear normal.      Left Ear: Hearing and external ear normal.      Nose: Nose normal.      Mouth/Throat:      Lips: Pink. Mouth: Mucous membranes are moist.   Cardiovascular:      Rate and Rhythm: Normal rate. Pulmonary:      Effort: Pulmonary effort is normal. No respiratory distress. Abdominal:      General: Abdomen is flat. Palpations: Abdomen is soft. Tenderness: There is no right CVA tenderness or left CVA tenderness. Musculoskeletal:      Lumbar back: She exhibits pain. She exhibits normal range of motion, no tenderness, no bony tenderness, no swelling, no edema, no deformity, no laceration and no spasm. Skin:     General: Skin is warm and dry. Neurological:      Mental Status: She is alert.        POC Testing Today:   Results for POC orders placed in visit on 05/12/20   POCT Urinalysis No Micro (Auto)

## 2020-05-14 LAB
ORGANISM: ABNORMAL
URINE CULTURE, ROUTINE: ABNORMAL

## 2020-05-14 RX ORDER — CIPROFLOXACIN 500 MG/1
500 TABLET, FILM COATED ORAL 2 TIMES DAILY
Qty: 14 TABLET | Refills: 0 | Status: SHIPPED | OUTPATIENT
Start: 2020-05-14 | End: 2020-05-21

## 2020-05-15 ENCOUNTER — VIRTUAL VISIT (OUTPATIENT)
Dept: FAMILY MEDICINE CLINIC | Age: 75
End: 2020-05-15
Payer: MEDICARE

## 2020-05-15 PROCEDURE — 4040F PNEUMOC VAC/ADMIN/RCVD: CPT | Performed by: NURSE PRACTITIONER

## 2020-05-15 PROCEDURE — G0438 PPPS, INITIAL VISIT: HCPCS | Performed by: NURSE PRACTITIONER

## 2020-05-15 PROCEDURE — 1123F ACP DISCUSS/DSCN MKR DOCD: CPT | Performed by: NURSE PRACTITIONER

## 2020-05-15 NOTE — PATIENT INSTRUCTIONS
Personalized Preventive Plan for Derick Winchester - 5/15/2020  Medicare offers a range of preventive health benefits. Some of the tests and screenings are paid in full while other may be subject to a deductible, co-insurance, and/or copay. Some of these benefits include a comprehensive review of your medical history including lifestyle, illnesses that may run in your family, and various assessments and screenings as appropriate. After reviewing your medical record and screening and assessments performed today your provider may have ordered immunizations, labs, imaging, and/or referrals for you. A list of these orders (if applicable) as well as your Preventive Care list are included within your After Visit Summary for your review. Other Preventive Recommendations:    · A preventive eye exam performed by an eye specialist is recommended every 1-2 years to screen for glaucoma; cataracts, macular degeneration, and other eye disorders. · A preventive dental visit is recommended every 6 months. · Try to get at least 150 minutes of exercise per week or 10,000 steps per day on a pedometer . · Order or download the FREE \"Exercise & Physical Activity: Your Everyday Guide\" from [employer]. Call 1-591.307.1164 or search [employer] online. · You need 4294-7758 mg of calcium and 8172-6812 IU of vitamin D per day. It is possible to meet your calcium requirement with diet alone, but a vitamin D supplement is usually necessary to meet this goal.  · When exposed to the sun, use a sunscreen that protects against both UVA and UVB radiation with an SPF of 30 or greater. Reapply every 2 to 3 hours or after sweating, drying off with a towel, or swimming. · Always wear a seat belt when traveling in a car. Always wear a helmet when riding a bicycle or motorcycle.

## 2020-05-15 NOTE — PROGRESS NOTES
04/10/1998    Annual Wellness Visit (AWV)  06/23/2019    Potassium monitoring  12/26/2020    Creatinine monitoring  12/26/2020    Colon cancer screen colonoscopy  04/03/2027    Flu vaccine  Completed    Pneumococcal 65+ years Vaccine  Completed    Hepatitis A vaccine  Aged Out    Hepatitis B vaccine  Aged Out    Hib vaccine  Aged Out    Meningococcal (ACWY) vaccine  Aged Out     Recommendations for [facility] Due: see orders and patient instructions/AVS.  . Recommended screening schedule for the next 5-10 years is provided to the patient in written form: see Patient Lynette Wooten was seen today for medicare awv. Diagnoses and all orders for this visit:    Routine general medical examination at a health care facility              Miah Velez is a 66 y.o. female being evaluated by a Virtual Visit (phone) encounter to address concerns as mentioned above. A caregiver was present when appropriate. Due to this being a TeleHealth encounter (During YGWFN-69 public health emergency), evaluation of the following organ systems was limited: Vitals/Constitutional/EENT/Resp/CV/GI//MS/Neuro/Skin/Heme-Lymph-Imm. Pursuant to the emergency declaration under the [address] [place] [address], [address] [place] [address] authority and the [employer] and [employer] Act, this Virtual Visit was conducted with patient's (and/or legal guardian's) consent, to reduce the patient's risk of exposure to COVID-19 and provide necessary medical care. The patient (and/or legal guardian) has also been advised to contact this office for worsening conditions or problems, and seek emergency medical treatment and/or call 911 if deemed necessary. Patient identification was verified at the start of the visit: Yes    Services were provided through phone to substitute for in-person clinic visit. Patient and provider were located at their individual homes.     --Adjondi

## 2020-07-11 ENCOUNTER — OFFICE VISIT (OUTPATIENT)
Dept: FAMILY MEDICINE CLINIC | Age: 75
End: 2020-07-11
Payer: MEDICARE

## 2020-07-11 VITALS
OXYGEN SATURATION: 97 % | HEART RATE: 84 BPM | SYSTOLIC BLOOD PRESSURE: 118 MMHG | HEIGHT: 65 IN | BODY MASS INDEX: 26.66 KG/M2 | TEMPERATURE: 97.7 F | WEIGHT: 160 LBS | DIASTOLIC BLOOD PRESSURE: 76 MMHG

## 2020-07-11 LAB
BILIRUBIN, POC: NORMAL
BLOOD URINE, POC: NORMAL
CLARITY, POC: CLEAR
COLOR, POC: YELLOW
GLUCOSE URINE, POC: NORMAL
KETONES, POC: NORMAL
LEUKOCYTE EST, POC: NORMAL
NITRITE, POC: NORMAL
PH, POC: 5.5
PROTEIN, POC: NORMAL
SPECIFIC GRAVITY, POC: 1.03
UROBILINOGEN, POC: 0.2

## 2020-07-11 PROCEDURE — 1036F TOBACCO NON-USER: CPT | Performed by: NURSE PRACTITIONER

## 2020-07-11 PROCEDURE — 81002 URINALYSIS NONAUTO W/O SCOPE: CPT | Performed by: NURSE PRACTITIONER

## 2020-07-11 PROCEDURE — 1123F ACP DISCUSS/DSCN MKR DOCD: CPT | Performed by: NURSE PRACTITIONER

## 2020-07-11 PROCEDURE — G8417 CALC BMI ABV UP PARAM F/U: HCPCS | Performed by: NURSE PRACTITIONER

## 2020-07-11 PROCEDURE — G8428 CUR MEDS NOT DOCUMENT: HCPCS | Performed by: NURSE PRACTITIONER

## 2020-07-11 PROCEDURE — 4040F PNEUMOC VAC/ADMIN/RCVD: CPT | Performed by: NURSE PRACTITIONER

## 2020-07-11 PROCEDURE — 1090F PRES/ABSN URINE INCON ASSESS: CPT | Performed by: NURSE PRACTITIONER

## 2020-07-11 PROCEDURE — G8400 PT W/DXA NO RESULTS DOC: HCPCS | Performed by: NURSE PRACTITIONER

## 2020-07-11 PROCEDURE — 99213 OFFICE O/P EST LOW 20 MIN: CPT | Performed by: NURSE PRACTITIONER

## 2020-07-11 RX ORDER — CIPROFLOXACIN 500 MG/1
500 TABLET, FILM COATED ORAL 2 TIMES DAILY
Qty: 14 TABLET | Refills: 0 | Status: SHIPPED | OUTPATIENT
Start: 2020-07-11 | End: 2020-08-19 | Stop reason: SDUPTHER

## 2020-07-11 NOTE — PROGRESS NOTES
Subjective  Love South, 66 y.o. female presents today with:  Chief Complaint   Patient presents with    Urinary Frequency     patient here today with burning, frequency,urgentcy when urinating       HPI   Presents to [facility] for urinary symptoms  Symptoms began 2 days prior  Burning, frequency, and urgency with urination  +suprapubic and flank pain  Denies fever. Intermittent chills   Mild nausea  UTI in June         Past Medical History:   Diagnosis Date    Anticoagulant long-term use     Carotid artery stenosis     Family history of early CAD 6/28/2016    Fibromyalgia     GERD (gastroesophageal reflux disease)     Hypertension     Neuropathy     Sleep apnea     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 myelopathy or radiculopathy       No past surgical history on file. Family History   Problem Relation Age of Onset    Crohn's Disease Sister     Colon Cancer Neg Hx        Review of Systems   Constitutional: Negative for activity change, appetite change, chills, diaphoresis, fatigue and fever. Gastrointestinal: Positive for abdominal pain and nausea. Negative for diarrhea and vomiting. Genitourinary: Positive for dysuria, flank pain and frequency. Negative for decreased urine volume, difficulty urinating, hematuria, pelvic pain, urgency, vaginal bleeding, vaginal discharge and vaginal pain. Musculoskeletal: Negative for myalgias. Skin: Negative for pallor. Neurological: Negative for dizziness, light-headedness and headaches. PMH, Surgical Hx, Family Hx, and Social Hx reviewed and updated.       Objective  Vitals:    07/11/20 1148   BP: 118/76   Site: Right Upper Arm   Position: Sitting   Cuff Size: Medium Adult   Pulse: 84   Temp: 97.7 °F (36.5 °C)   TempSrc: Oral   SpO2: 97%   Weight: 160 lb (72.6 kg)   Height: 5' 5\" (1.651 m)     BP Readings from Last 3 Encounters:   07/11/20 118/76   05/12/20 120/80   02/27/20 132/84     Wt Readings from Last 3 Encounters:   07/11/20 160 lb (72.6 kg)   05/12/20 160 lb (72.6 kg)   02/27/20 156 lb 9.6 oz (71 kg)       Physical Exam  Vitals signs reviewed. Constitutional:       Appearance: She is well-developed. HENT:      Head: Normocephalic. Eyes:      General: Lids are normal.      Conjunctiva/sclera: Conjunctivae normal.      Pupils: Pupils are equal, round, and reactive to light. Neck:      Musculoskeletal: Normal range of motion. Cardiovascular:      Rate and Rhythm: Normal rate. Pulmonary:      Effort: Pulmonary effort is normal.   Abdominal:      Palpations: Abdomen is soft. Tenderness: There is abdominal tenderness in the suprapubic area. There is no right CVA tenderness, left CVA tenderness, guarding or rebound. Musculoskeletal: Normal range of motion. Skin:     General: Skin is warm and dry. Coloration: Skin is not pale. Neurological:      Mental Status: She is alert and oriented to person, place, and time. Psychiatric:         Mood and Affect: Mood normal.         Behavior: Behavior normal.           Assessment & Plan    Diagnosis Orders   1. Acute cystitis with hematuria  ciprofloxacin (CIPRO) 500 MG tablet   2. Frequency of urination  POCT Urinalysis no Micro    Culture, Urine   3. Dysuria       Orders Placed This Encounter   Procedures    Culture, Urine     Standing Status:   Future     Standing Expiration Date:   7/11/2021     Order Specific Question:   Specify (ex-cath, midstream, cysto, etc)? Answer:   mid stream    POCT Urinalysis no Micro     Orders Placed This Encounter   Medications    ciprofloxacin (CIPRO) 500 MG tablet     Sig: Take 1 tablet by mouth 2 times daily for 7 days     Dispense:  14 tablet     Refill:  0     Return if symptoms worsen or fail to improve. Reviewed with the patient: current clinical status & medications.  Side effects, adverse effects of the medication prescribed today, as well as treatment plan/rationale and result expectations have been discussed with the patient who expresses understanding. Will update patient with urine culture when it is available. How can you care for yourself at home? · Take your antibiotics as directed. Do not stop taking them just because you feel better. You need to take the full course of antibiotics. · Drink extra water and other fluids for the next day or two. This may help wash out the bacteria that are causing the infection. (If you have kidney, heart, or liver disease and have to limit fluids, talk with your doctor before you increase your fluid intake.)  · Avoid drinks that are carbonated or have caffeine. They can irritate the bladder. · Urinate often. Try to empty your bladder each time. · To relieve pain, take a hot bath or lay a heating pad set on low over your lower belly or genital area. Never go to sleep with a heating pad in place. To prevent UTIs  · Drink plenty of water each day. This helps you urinate often, which clears bacteria from your system. (If you have kidney, heart, or liver disease and have to limit fluids, talk with your doctor before you increase your fluid intake.)  · Urinate when you need to. · Urinate right after you have sex. · Change sanitary pads often. · Avoid douches, bubble baths, feminine hygiene sprays, and other feminine hygiene products that have deodorants. · After going to the bathroom, wipe from front to back. When should you call for help? Call your doctor now or seek immediate medical care if:  · Symptoms such as fever, chills, nausea, or vomiting get worse or appear for the first time. · You have new pain in your back just below your rib cage. This is called flank pain. · There is new blood or pus in your urine. · You have any problems with your antibiotic medicine. Watch closely for changes in your health, and be sure to contact your doctor if:  · You are not getting better after taking an antibiotic for 2 days. · Your symptoms go away but then come back.         Close follow up to evaluate treatment results and for coordination of care. I have reviewed the patient's medical history in detail and updated the computerized patient record.       SADAF Espinal NP

## 2020-07-11 NOTE — PATIENT INSTRUCTIONS
Patient Education        Urinary Tract Infection in Women: Care Instructions  Your Care Instructions     A urinary tract infection, or UTI, is a general term for an infection anywhere between the kidneys and the urethra (where urine comes out). Most UTIs are bladder infections. They often cause pain or burning when you urinate. UTIs are caused by bacteria and can be cured with antibiotics. Be sure to complete your treatment so that the infection goes away. Follow-up care is a key part of your treatment and safety. Be sure to make and go to all appointments, and call your doctor if you are having problems. It's also a good idea to know your test results and keep a list of the medicines you take. How can you care for yourself at home? · Take your antibiotics as directed. Do not stop taking them just because you feel better. You need to take the full course of antibiotics. · Drink extra water and other fluids for the next day or two. This may help wash out the bacteria that are causing the infection. (If you have kidney, heart, or liver disease and have to limit fluids, talk with your doctor before you increase your fluid intake.)  · Avoid drinks that are carbonated or have caffeine. They can irritate the bladder. · Urinate often. Try to empty your bladder each time. · To relieve pain, take a hot bath or lay a heating pad set on low over your lower belly or genital area. Never go to sleep with a heating pad in place. To prevent UTIs  · Drink plenty of water each day. This helps you urinate often, which clears bacteria from your system. (If you have kidney, heart, or liver disease and have to limit fluids, talk with your doctor before you increase your fluid intake.)  · Urinate when you need to. · Urinate right after you have sex. · Change sanitary pads often. · Avoid douches, bubble baths, feminine hygiene sprays, and other feminine hygiene products that have deodorants.   · After going to the bathroom, wipe from front to back. When should you call for help? Call your doctor now or seek immediate medical care if:  · Symptoms such as fever, chills, nausea, or vomiting get worse or appear for the first time. · You have new pain in your back just below your rib cage. This is called flank pain. · There is new blood or pus in your urine. · You have any problems with your antibiotic medicine. Watch closely for changes in your health, and be sure to contact your doctor if:  · You are not getting better after taking an antibiotic for 2 days. · Your symptoms go away but then come back. Where can you learn more? Go to [email or web address] and sign in to your [email or web address] account. Enter N413 in the Prizzm box to learn more about \"Urinary Tract Infection in Women: Care Instructions. \"     If you do not have an account, please click on the \"Sign Up Now\" link. Current as of: August 22, 2019               Content Version: 12.5  © 7404-0030 Healthwise, Incorporated. Care instructions adapted under license by [facility] ([facility]). If you have questions about a medical condition or this instruction, always ask your healthcare professional. [place] [address] any warranty or liability for your use of this information.

## 2020-07-12 ASSESSMENT — ENCOUNTER SYMPTOMS
DIARRHEA: 0
VOMITING: 0
ABDOMINAL PAIN: 1
NAUSEA: 1

## 2020-07-13 DIAGNOSIS — R35.0 FREQUENCY OF URINATION: ICD-10-CM

## 2020-07-15 LAB — URINE CULTURE, ROUTINE: NORMAL

## 2020-08-19 ENCOUNTER — OFFICE VISIT (OUTPATIENT)
Dept: FAMILY MEDICINE CLINIC | Age: 75
End: 2020-08-19
Payer: MEDICARE

## 2020-08-19 ENCOUNTER — NURSE TRIAGE (OUTPATIENT)
Dept: OTHER | Facility: CLINIC | Age: 75
End: 2020-08-19

## 2020-08-19 VITALS
WEIGHT: 155 LBS | DIASTOLIC BLOOD PRESSURE: 70 MMHG | HEART RATE: 72 BPM | OXYGEN SATURATION: 97 % | HEIGHT: 65 IN | SYSTOLIC BLOOD PRESSURE: 112 MMHG | TEMPERATURE: 97.6 F | BODY MASS INDEX: 25.83 KG/M2

## 2020-08-19 PROCEDURE — 1036F TOBACCO NON-USER: CPT | Performed by: NURSE PRACTITIONER

## 2020-08-19 PROCEDURE — 81002 URINALYSIS NONAUTO W/O SCOPE: CPT | Performed by: NURSE PRACTITIONER

## 2020-08-19 PROCEDURE — G8400 PT W/DXA NO RESULTS DOC: HCPCS | Performed by: NURSE PRACTITIONER

## 2020-08-19 PROCEDURE — 4040F PNEUMOC VAC/ADMIN/RCVD: CPT | Performed by: NURSE PRACTITIONER

## 2020-08-19 PROCEDURE — G8417 CALC BMI ABV UP PARAM F/U: HCPCS | Performed by: NURSE PRACTITIONER

## 2020-08-19 PROCEDURE — 1123F ACP DISCUSS/DSCN MKR DOCD: CPT | Performed by: NURSE PRACTITIONER

## 2020-08-19 PROCEDURE — 1090F PRES/ABSN URINE INCON ASSESS: CPT | Performed by: NURSE PRACTITIONER

## 2020-08-19 PROCEDURE — 99213 OFFICE O/P EST LOW 20 MIN: CPT | Performed by: NURSE PRACTITIONER

## 2020-08-19 PROCEDURE — G8427 DOCREV CUR MEDS BY ELIG CLIN: HCPCS | Performed by: NURSE PRACTITIONER

## 2020-08-19 RX ORDER — CIPROFLOXACIN 500 MG/1
500 TABLET, FILM COATED ORAL 2 TIMES DAILY
Qty: 14 TABLET | Refills: 0 | Status: SHIPPED | OUTPATIENT
Start: 2020-08-19 | End: 2020-08-26

## 2020-08-19 ASSESSMENT — ENCOUNTER SYMPTOMS
SHORTNESS OF BREATH: 0
ABDOMINAL PAIN: 0
VOMITING: 0
NAUSEA: 1
DIARRHEA: 0
CONSTIPATION: 0

## 2020-08-19 NOTE — TELEPHONE ENCOUNTER
Pt seen today in office and being treated for UTI, pt states she is fatigued and wanting to see PCP, questioned pt if she mentioned being fatigued at her appointment pt stated yes, encouraged pt to wait until next week after her antibiotics are finished    Reason for Disposition  [facility] Caller has already spoken with another triager or PCP (or office), and has further questions and triager able to answer questions.     Protocols used: NO CONTACT OR DUPLICATE CONTACT CALL-ADULT-[place]    Call transferred to [facility] for scheduling follow up appoitment

## 2020-08-19 NOTE — PROGRESS NOTES
Subjective     Sujatha Cooper 66 y.o. female presents 8/19/20 with   Chief Complaint   Patient presents with    Urinary Frequency     Patient here today with urgancy, frequency and burning appointment Oct 3rd       Urinary Tract Infection    This is a new problem. The current episode started in the past 7 days. The problem has been gradually worsening. There has been no fever. There is no history of pyelonephritis. Associated symptoms include frequency, nausea and urgency (pressure). Pertinent negatives include no chills, flank pain, hematuria or vomiting. She has tried nothing for the symptoms. Her past medical history is significant for recurrent UTIs and a urological procedure. Patient sees urology with [facility], has appt 10/03. Had bladder stimulator placed around 5-6 years ago, believes she needs to have this replaced, it is the second one she has had      Reviewed the following history:    Past Medical History:   Diagnosis Date    Anticoagulant long-term use     Carotid artery stenosis     Family history of early CAD 6/28/2016    Fibromyalgia     GERD (gastroesophageal reflux disease)     Hypertension     Neuropathy     Sleep apnea     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 myelopathy or radiculopathy      No past surgical history on file.   Family History   Problem Relation Age of Onset    Crohn's Disease Sister     Colon Cancer Neg Hx        Allergies   Allergen Reactions    Latex     Amoxicillin-Pot Clavulanate      Other reaction(s): Vomiting    Carbidopa-Levodopa      Other reaction(s): Unknown  Effects the eyes    Iv [Iodides]     Morphine Other (See Comments)     Made her go 'berserk '    Nitrofurantoin Other (See Comments)     Myalgia    Plavix [Clopidogrel Bisulfate]     Pravastatin Other (See Comments)     Myalgia       Current Outpatient Medications on File Prior to Visit   Medication Sig Dispense Refill    Wheat Dextrin (BENEFIBER) POWD Start with 2 to 3 tsp daily with 8 oz General: No focal deficit present. Mental Status: She is alert and oriented to person, place, and time. POC Testing Today:   Results for POC orders placed in visit on 08/19/20   POCT Urinalysis no Micro   Result Value Ref Range    Color, UA yellow     Clarity, UA clear     Glucose, UA POC neg     Bilirubin, UA neg     Ketones, UA neg     Spec Grav, UA 1.030     Blood, UA POC neg     pH, UA 5.5     Protein, UA POC neg     Urobilinogen, UA 0.2     Leukocytes, UA 1+     Nitrite, UA neg        Assessment and Plan    Seda Padilla was seen today for urinary frequency. Diagnoses and all orders for this visit:    UTI symptoms  -     POCT Urinalysis no Micro  -     Culture, Urine; Future  -     ciprofloxacin (CIPRO) 500 MG tablet; Take 1 tablet by mouth 2 times daily for 7 days        Return if symptoms worsen or fail to improve, for follow up with PCP. Side effects and adverse effects of any medication prescribed today, as well as treatment plan/rationale, follow-up care, and result expectations have been discussed with the patient. Expresses understanding and desires to proceed with treatment plan. The patient was reminded that if an antibiotic has been prescribed the predicted course is improvement to cure with no persistent issues. Take antibiotics as directed. If any problems occur, an appointment should be made or ER visit if severe. Because of the risk with ANY antibiotic of C. Difficile colitis if persistent diarrhea or abdominal pain or any concerning symptoms, we should be notified. To reduce this risk, a probiotic pill, yogurt or other preparations containing active cultures should be ingested daily -particularly while on the antibiotic. If any persistent symptoms of illness, follow up appointment should be made in a timely fashion with a physician. Discussed signs and symptoms which require immediate follow-up in ED/call to 911. Understanding verbalized.     I have reviewed and updated the electronic medical record.     Rajiv Mo, APRN - CNP

## 2020-09-02 ENCOUNTER — OFFICE VISIT (OUTPATIENT)
Dept: CARDIOLOGY CLINIC | Age: 75
End: 2020-09-02
Payer: MEDICARE

## 2020-09-02 VITALS
BODY MASS INDEX: 25.79 KG/M2 | DIASTOLIC BLOOD PRESSURE: 84 MMHG | HEART RATE: 81 BPM | WEIGHT: 155 LBS | OXYGEN SATURATION: 96 % | SYSTOLIC BLOOD PRESSURE: 122 MMHG | RESPIRATION RATE: 16 BRPM

## 2020-09-02 PROCEDURE — 1090F PRES/ABSN URINE INCON ASSESS: CPT | Performed by: INTERNAL MEDICINE

## 2020-09-02 PROCEDURE — G8417 CALC BMI ABV UP PARAM F/U: HCPCS | Performed by: INTERNAL MEDICINE

## 2020-09-02 PROCEDURE — G8427 DOCREV CUR MEDS BY ELIG CLIN: HCPCS | Performed by: INTERNAL MEDICINE

## 2020-09-02 PROCEDURE — 99214 OFFICE O/P EST MOD 30 MIN: CPT | Performed by: INTERNAL MEDICINE

## 2020-09-02 PROCEDURE — 4040F PNEUMOC VAC/ADMIN/RCVD: CPT | Performed by: INTERNAL MEDICINE

## 2020-09-02 PROCEDURE — G8400 PT W/DXA NO RESULTS DOC: HCPCS | Performed by: INTERNAL MEDICINE

## 2020-09-02 PROCEDURE — 1123F ACP DISCUSS/DSCN MKR DOCD: CPT | Performed by: INTERNAL MEDICINE

## 2020-09-02 PROCEDURE — 1036F TOBACCO NON-USER: CPT | Performed by: INTERNAL MEDICINE

## 2020-09-02 RX ORDER — ASPIRIN 81 MG/1
81 TABLET ORAL DAILY
Qty: 90 TABLET | Refills: 3 | Status: SHIPPED | OUTPATIENT
Start: 2020-09-02

## 2020-09-02 ASSESSMENT — ENCOUNTER SYMPTOMS
STRIDOR: 0
EYES NEGATIVE: 1
COUGH: 0
BLOOD IN STOOL: 0
CHEST TIGHTNESS: 0
GASTROINTESTINAL NEGATIVE: 1
NAUSEA: 0
SHORTNESS OF BREATH: 1
WHEEZING: 0

## 2020-09-02 NOTE — PROGRESS NOTES
Subsequent Progress Note  Patient: Kaye Iniguez  YOB: 1945  MRN: [id number]    Chief Complaint: CP BEATRICE HTN VAZQUEZ dizzy  Chief Complaint   Patient presents with    6 Month Follow-Up    Hypertension    Hyperlipidemia   Prior Dr. Cleve Moss pt. 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 1/2020 spect negative. 1/2020 echo ef 60  1/2020 CUS mild     Subjective/HPI: recent in hosp for CP. No ACS and released. She still has CP sometimes related to eating other times not. She has signficant VAZQUEZ with walking. Getting worse. Has BEATRICE but not using CPAP as it leaks a lot. 1/30/2020 fell this past Saturday while picking up dog waste. No major injuries. She walked in here today. She has been dizzy lately. 9/2/2020 still dizzy but no further falls no bleed. Takes meds. stopped asa. . she has developed Dysphagia and choking with solids and liquids. Life  Long nonsmoker  No ETOH  Retired- . EKG:    Past Medical History:   Diagnosis Date    Anticoagulant long-term use     Carotid artery stenosis     Family history of early CAD 6/28/2016    Fibromyalgia     GERD (gastroesophageal reflux disease)     Hypertension     Neuropathy     Sleep apnea     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 myelopathy or radiculopathy        History reviewed. No pertinent surgical history.     Family History   Problem Relation Age of Onset    Crohn's Disease Sister     Colon Cancer Neg Hx        Social History     Socioeconomic History    Marital status:      Spouse name: None    Number of children: None    Years of education: None    Highest education level: None   Occupational History    None   Social Needs    Financial resource strain: None    Food insecurity     Worry: None     Inability: None    Transportation needs     Medical: None     Non-medical: None   Tobacco Use    Smoking status: Never Smoker    Smokeless tobacco: Never Used   Substance and Sexual Activity    Alcohol use: No    Drug use: None    Sexual activity: None   Lifestyle    Physical activity     Days per week: None     Minutes per session: None    Stress: None   Relationships    Social connections     Talks on phone: None     Gets together: None     Attends Judaism service: None     Active member of club or organization: None     Attends meetings of clubs or organizations: None     Relationship status: None    Intimate partner violence     Fear of current or ex partner: None     Emotionally abused: None     Physically abused: None     Forced sexual activity: None   Other Topics Concern    None   Social History Narrative    None       Allergies   Allergen Reactions    Latex     Amoxicillin-Pot Clavulanate      Other reaction(s): Vomiting    Carbidopa-Levodopa      Other reaction(s): Unknown  Effects the eyes    Iv [Iodides]     Morphine Other (See Comments)     Made her go 'berserk '    Nitrofurantoin Other (See Comments)     Myalgia    Plavix [Clopidogrel Bisulfate]     Pravastatin Other (See Comments)     Myalgia       Current Outpatient Medications   Medication Sig Dispense Refill    aspirin EC 81 MG EC tablet Take 1 tablet by mouth daily 90 tablet 3    Wheat Dextrin (BENEFIBER) POWD Start with 2 to 3 tsp daily with 8 oz of water, titrate based on stool consistency, titrate down based on bloating & abdominal cramps 1 Can 3    pantoprazole (PROTONIX) 40 MG tablet Take 1 tablet by mouth 2 times daily (before meals) 60 tablet 5    oxymetazoline (12 HOUR NASAL SPRAY) 0.05 % nasal spray 2 sprays by Nasal route 2 times daily 1 Bottle 3     No current facility-administered medications for this visit. Review of Systems:   Review of Systems   Constitutional: Positive for fatigue. Negative for diaphoresis. HENT: Negative. Eyes: Negative. Respiratory: Positive for shortness of breath. Negative for cough, chest tightness, wheezing and stridor.     Cardiovascular: Positive for chest pain. Negative for palpitations and leg swelling. Gastrointestinal: Negative. Negative for blood in stool and nausea. Genitourinary: Negative. Musculoskeletal: Negative. Skin: Negative. Neurological: Positive for dizziness and light-headedness. Negative for syncope and weakness. Hematological: Negative. Psychiatric/Behavioral: Negative. Physical Examination:    /84 (Site: Right Upper Arm, Position: Sitting, Cuff Size: Medium Adult)   Pulse 81   Resp 16   Wt 155 lb (70.3 kg)   SpO2 96%   BMI 25.79 kg/m²    Physical Exam   Constitutional: She appears healthy. No distress. HENT:   Normal cephalic and Atraumatic   Eyes: Pupils are equal, round, and reactive to light. Neck: Normal range of motion and thyroid normal. Neck supple. No JVD present. No neck adenopathy. No thyromegaly present. Cardiovascular: Normal rate, regular rhythm, intact distal pulses and normal pulses. Murmur heard. Pulmonary/Chest: Effort normal and breath sounds normal. She has no wheezes. She has no rales. She exhibits no tenderness. Abdominal: Soft. Bowel sounds are normal. There is no abdominal tenderness. Musculoskeletal: Normal range of motion. General: No tenderness or edema. Neurological: She is alert and oriented to person, place, and time. Skin: Skin is warm. No cyanosis. Nails show no clubbing.        LABS:  CBC:   Lab Results   Component Value Date    WBC 6.6 12/26/2019    RBC 4.48 12/26/2019    RBC 4.35 06/01/2012    HGB 13.8 12/26/2019    HCT 40.9 12/26/2019    MCV 91.1 12/26/2019    MCH 30.7 12/26/2019    MCHC 33.7 12/26/2019    RDW 14.2 12/26/2019     12/26/2019    MPV 9.0 09/03/2014     Lipids:  Lab Results   Component Value Date    CHOL 169 09/21/2019    CHOL 105 06/16/2016    CHOL 189 09/02/2014     Lab Results   Component Value Date    TRIG 132 06/16/2016    TRIG 188 09/02/2014    TRIG 192 04/16/2014     Lab Results   Component Value Date

## 2020-09-04 ENCOUNTER — OFFICE VISIT (OUTPATIENT)
Dept: FAMILY MEDICINE CLINIC | Age: 75
End: 2020-09-04
Payer: MEDICARE

## 2020-09-04 VITALS
HEIGHT: 65 IN | OXYGEN SATURATION: 98 % | WEIGHT: 155 LBS | TEMPERATURE: 97 F | HEART RATE: 76 BPM | SYSTOLIC BLOOD PRESSURE: 140 MMHG | RESPIRATION RATE: 14 BRPM | BODY MASS INDEX: 25.83 KG/M2 | DIASTOLIC BLOOD PRESSURE: 80 MMHG

## 2020-09-04 DIAGNOSIS — R53.83 FATIGUE, UNSPECIFIED TYPE: ICD-10-CM

## 2020-09-04 LAB
ALBUMIN SERPL-MCNC: 4 G/DL (ref 3.5–4.6)
ALP BLD-CCNC: 91 U/L (ref 40–130)
ALT SERPL-CCNC: 15 U/L (ref 0–33)
ANION GAP SERPL CALCULATED.3IONS-SCNC: 10 MEQ/L (ref 9–15)
AST SERPL-CCNC: 20 U/L (ref 0–35)
BASOPHILS ABSOLUTE: 0 K/UL (ref 0–0.2)
BASOPHILS RELATIVE PERCENT: 0.5 %
BILIRUB SERPL-MCNC: 0.3 MG/DL (ref 0.2–0.7)
BUN BLDV-MCNC: 11 MG/DL (ref 8–23)
CALCIUM SERPL-MCNC: 9.3 MG/DL (ref 8.5–9.9)
CHLORIDE BLD-SCNC: 105 MEQ/L (ref 95–107)
CO2: 25 MEQ/L (ref 20–31)
CREAT SERPL-MCNC: 0.65 MG/DL (ref 0.5–0.9)
EOSINOPHILS ABSOLUTE: 0.1 K/UL (ref 0–0.7)
EOSINOPHILS RELATIVE PERCENT: 1.4 %
FOLATE: 19.2 NG/ML (ref 7.3–26.1)
GFR AFRICAN AMERICAN: >60
GFR NON-AFRICAN AMERICAN: >60
GLOBULIN: 2.5 G/DL (ref 2.3–3.5)
GLUCOSE BLD-MCNC: 79 MG/DL (ref 70–99)
HCT VFR BLD CALC: 40.2 % (ref 37–47)
HEMOGLOBIN: 13.2 G/DL (ref 12–16)
LYMPHOCYTES ABSOLUTE: 2.7 K/UL (ref 1–4.8)
LYMPHOCYTES RELATIVE PERCENT: 39.6 %
MCH RBC QN AUTO: 29.6 PG (ref 27–31.3)
MCHC RBC AUTO-ENTMCNC: 32.8 % (ref 33–37)
MCV RBC AUTO: 90.1 FL (ref 82–100)
MONOCYTES ABSOLUTE: 0.7 K/UL (ref 0.2–0.8)
MONOCYTES RELATIVE PERCENT: 10.2 %
NEUTROPHILS ABSOLUTE: 3.4 K/UL (ref 1.4–6.5)
NEUTROPHILS RELATIVE PERCENT: 48.3 %
PDW BLD-RTO: 14.2 % (ref 11.5–14.5)
PLATELET # BLD: 239 K/UL (ref 130–400)
POTASSIUM SERPL-SCNC: 3.9 MEQ/L (ref 3.4–4.9)
RBC # BLD: 4.46 M/UL (ref 4.2–5.4)
SODIUM BLD-SCNC: 140 MEQ/L (ref 135–144)
TOTAL PROTEIN: 6.5 G/DL (ref 6.3–8)
TSH REFLEX: 0.98 UIU/ML (ref 0.44–3.86)
VITAMIN B-12: 448 PG/ML (ref 232–1245)
WBC # BLD: 6.9 K/UL (ref 4.8–10.8)

## 2020-09-04 PROCEDURE — 1123F ACP DISCUSS/DSCN MKR DOCD: CPT | Performed by: INTERNAL MEDICINE

## 2020-09-04 PROCEDURE — 1036F TOBACCO NON-USER: CPT | Performed by: INTERNAL MEDICINE

## 2020-09-04 PROCEDURE — G8427 DOCREV CUR MEDS BY ELIG CLIN: HCPCS | Performed by: INTERNAL MEDICINE

## 2020-09-04 PROCEDURE — G8417 CALC BMI ABV UP PARAM F/U: HCPCS | Performed by: INTERNAL MEDICINE

## 2020-09-04 PROCEDURE — 99213 OFFICE O/P EST LOW 20 MIN: CPT | Performed by: INTERNAL MEDICINE

## 2020-09-04 PROCEDURE — 1090F PRES/ABSN URINE INCON ASSESS: CPT | Performed by: INTERNAL MEDICINE

## 2020-09-04 PROCEDURE — 4040F PNEUMOC VAC/ADMIN/RCVD: CPT | Performed by: INTERNAL MEDICINE

## 2020-09-04 PROCEDURE — G8400 PT W/DXA NO RESULTS DOC: HCPCS | Performed by: INTERNAL MEDICINE

## 2020-09-04 RX ORDER — PRIMIDONE 50 MG/1
25 TABLET ORAL NIGHTLY
Qty: 16 TABLET | Refills: 3 | Status: ON HOLD | OUTPATIENT
Start: 2020-09-04 | End: 2022-01-27

## 2020-09-04 ASSESSMENT — ENCOUNTER SYMPTOMS
ABDOMINAL PAIN: 0
CONSTIPATION: 0
VOMITING: 0
DIARRHEA: 0
SHORTNESS OF BREATH: 0

## 2020-09-04 NOTE — PROGRESS NOTES
Subjective     Emmy Check 66 y.o. female presents 9/8/20 with   Chief Complaint   Patient presents with    Urinary Tract Infection     f/u pt states she has a bladder stimulator and she doesnt think its working . she also has an appt with uriology on 10/8/20       Patient sees urology with [facility], has appt 10/03. Had bladder stimulator placed around 5-6 years ago. At this time she voices concern regarding an intentional tremor. She would like pharmacologic therapy to assist in addressing this concern. Bilateral hand pain: The patient reports bilateral hand pain she is concerned as to whether this represents rheumatoid arthritis. Fatigue: The patient states that she has been experiencing persistent fatigue for several months. Reviewed the following history:    Past Medical History:   Diagnosis Date    Anticoagulant long-term use     Carotid artery stenosis     Family history of early CAD 6/28/2016    Fibromyalgia     GERD (gastroesophageal reflux disease)     Hypertension     Neuropathy     Sleep apnea     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 myelopathy or radiculopathy      No past surgical history on file.   Family History   Problem Relation Age of Onset    Crohn's Disease Sister     Colon Cancer Neg Hx        Allergies   Allergen Reactions    Latex     Amoxicillin-Pot Clavulanate      Other reaction(s): Vomiting    Carbidopa-Levodopa      Other reaction(s): Unknown  Effects the eyes    Iv [Iodides]     Morphine Other (See Comments)     Made her go 'berserk '    Nitrofurantoin Other (See Comments)     Myalgia    Plavix [Clopidogrel Bisulfate]     Pravastatin Other (See Comments)     Myalgia       Current Outpatient Medications on File Prior to Visit   Medication Sig Dispense Refill    aspirin EC 81 MG EC tablet Take 1 tablet by mouth daily 90 tablet 3    Wheat Dextrin (BENEFIBER) POWD Start with 2 to 3 tsp daily with 8 oz of water, titrate based on stool consistency, titrate down based on bloating & abdominal cramps 1 Can 3    pantoprazole (PROTONIX) 40 MG tablet Take 1 tablet by mouth 2 times daily (before meals) 60 tablet 5    oxymetazoline (12 HOUR NASAL SPRAY) 0.05 % nasal spray 2 sprays by Nasal route 2 times daily 1 Bottle 3     No current facility-administered medications on file prior to visit. Review of Systems   Constitutional: Negative for chills, diaphoresis, fatigue and fever. HENT: Negative for congestion, dental problem, drooling, ear discharge, ear pain, facial swelling, hearing loss, mouth sores, nosebleeds, postnasal drip, rhinorrhea, sinus pressure, sinus pain, sneezing, sore throat, tinnitus, trouble swallowing and voice change. Eyes: Negative for photophobia, pain, discharge, redness, itching and visual disturbance. Respiratory: Negative for apnea, cough, chest tightness, shortness of breath and wheezing. Cardiovascular: Negative for chest pain, palpitations and leg swelling. Gastrointestinal: Negative for abdominal distention, abdominal pain, blood in stool, constipation, diarrhea, nausea, rectal pain and vomiting. Endocrine: Negative for cold intolerance, heat intolerance, polydipsia, polyphagia and polyuria. Genitourinary: Negative for decreased urine volume, difficulty urinating, dysuria, flank pain, frequency, genital sores, hematuria, pelvic pain, urgency (pressure), vaginal bleeding, vaginal discharge and vaginal pain. Musculoskeletal: Negative for arthralgias, back pain, gait problem, joint swelling, myalgias, neck pain and neck stiffness. Skin: Negative for color change, rash and wound. Allergic/Immunologic: Negative for environmental allergies and food allergies. Neurological: Negative for dizziness, tremors, seizures, syncope, facial asymmetry, speech difficulty, weakness, light-headedness, numbness and headaches. Hematological: Negative for adenopathy. Does not bruise/bleed easily.    Psychiatric/Behavioral: Negative for agitation, confusion, decreased concentration, hallucinations, self-injury, sleep disturbance and suicidal ideas. The patient is not nervous/anxious. Objective    Vitals:    09/04/20 1310   BP: (!) 140/80   Pulse: 76   Resp: 14   Temp: 97 °F (36.1 °C)   SpO2: 98%   Weight: 155 lb (70.3 kg)   Height: 5' 5\" (1.651 m)       Physical Exam  Vitals signs reviewed. Constitutional:       General: She is not in acute distress. Appearance: She is well-developed. She is not ill-appearing or toxic-appearing. HENT:      Head: Normocephalic and atraumatic. Right Ear: Hearing and external ear normal.      Left Ear: Hearing and external ear normal.   Cardiovascular:      Rate and Rhythm: Normal rate and regular rhythm. Pulmonary:      Effort: Pulmonary effort is normal. No respiratory distress. Breath sounds: Normal breath sounds. Abdominal:      General: Abdomen is flat. Bowel sounds are normal.      Palpations: Abdomen is soft. Tenderness: There is no abdominal tenderness. There is no right CVA tenderness or left CVA tenderness. Skin:     General: Skin is warm and dry. Neurological:      General: No focal deficit present. Mental Status: She is alert and oriented to person, place, and time. POC Testing Today:   No results found for this visit on 09/04/20. Assessment and Plan    Majo Griffith was seen today for urinary tract infection. Diagnoses and all orders for this visit:    Pain in both hands  -     Amb External Referral To Orthopedic Surgery  -     Cancel: XR HAND RIGHT (2 VIEWS); Future  -     Cancel: XR HAND LEFT (2 VIEWS); Future    Fatigue, unspecified type  -     TSH with Reflex; Future  -     Vitamin B12 & Folate; Future  -     Comprehensive Metabolic Panel; Future  -     CBC With Auto Differential; Future    Other orders  -     primidone (MYSOLINE) 50 MG tablet; Take 0.5 tablets by mouth nightly        No follow-ups on file.       Kassy Hobbs MD

## 2020-09-08 ASSESSMENT — ENCOUNTER SYMPTOMS
EYE PAIN: 0
COLOR CHANGE: 0
TROUBLE SWALLOWING: 0
CHEST TIGHTNESS: 0
SORE THROAT: 0
WHEEZING: 0
COUGH: 0
SINUS PAIN: 0
BACK PAIN: 0
VOICE CHANGE: 0
RHINORRHEA: 0
EYE ITCHING: 0
SINUS PRESSURE: 0
EYE REDNESS: 0
APNEA: 0
FACIAL SWELLING: 0
RECTAL PAIN: 0
PHOTOPHOBIA: 0
ABDOMINAL DISTENTION: 0
BLOOD IN STOOL: 0
EYE DISCHARGE: 0
NAUSEA: 0

## 2020-09-15 ENCOUNTER — OFFICE VISIT (OUTPATIENT)
Dept: GASTROENTEROLOGY | Age: 75
End: 2020-09-15
Payer: MEDICARE

## 2020-09-15 ENCOUNTER — NURSE ONLY (OUTPATIENT)
Dept: PRIMARY CARE CLINIC | Age: 75
End: 2020-09-15

## 2020-09-15 ENCOUNTER — HOSPITAL ENCOUNTER (OUTPATIENT)
Age: 75
Setting detail: SPECIMEN
Discharge: HOME | End: 2020-09-15
Payer: MEDICARE

## 2020-09-15 VITALS
RESPIRATION RATE: 16 BRPM | HEIGHT: 65 IN | WEIGHT: 156 LBS | HEART RATE: 84 BPM | SYSTOLIC BLOOD PRESSURE: 116 MMHG | OXYGEN SATURATION: 96 % | DIASTOLIC BLOOD PRESSURE: 82 MMHG | BODY MASS INDEX: 25.99 KG/M2

## 2020-09-15 PROCEDURE — 1036F TOBACCO NON-USER: CPT | Performed by: SPECIALIST

## 2020-09-15 PROCEDURE — G8427 DOCREV CUR MEDS BY ELIG CLIN: HCPCS | Performed by: SPECIALIST

## 2020-09-15 PROCEDURE — G8417 CALC BMI ABV UP PARAM F/U: HCPCS | Performed by: SPECIALIST

## 2020-09-15 PROCEDURE — 4040F PNEUMOC VAC/ADMIN/RCVD: CPT | Performed by: SPECIALIST

## 2020-09-15 PROCEDURE — 99213 OFFICE O/P EST LOW 20 MIN: CPT | Performed by: SPECIALIST

## 2020-09-15 PROCEDURE — G8400 PT W/DXA NO RESULTS DOC: HCPCS | Performed by: SPECIALIST

## 2020-09-15 PROCEDURE — 1123F ACP DISCUSS/DSCN MKR DOCD: CPT | Performed by: SPECIALIST

## 2020-09-15 PROCEDURE — U0003 INFECTIOUS AGENT DETECTION BY NUCLEIC ACID (DNA OR RNA); SEVERE ACUTE RESPIRATORY SYNDROME CORONAVIRUS 2 (SARS-COV-2) (CORONAVIRUS DISEASE [COVID-19]), AMPLIFIED PROBE TECHNIQUE, MAKING USE OF HIGH THROUGHPUT TECHNOLOGIES AS DESCRIBED BY CMS-2020-01-R: HCPCS

## 2020-09-15 PROCEDURE — 1090F PRES/ABSN URINE INCON ASSESS: CPT | Performed by: SPECIALIST

## 2020-09-15 RX ORDER — SODIUM, POTASSIUM,MAG SULFATES 17.5-3.13G
SOLUTION, RECONSTITUTED, ORAL ORAL
Qty: 1 BOTTLE | Refills: 0 | Status: SHIPPED | OUTPATIENT
Start: 2020-09-15 | End: 2021-01-04

## 2020-09-15 RX ORDER — CHOLESTYRAMINE 4 G/9G
1 POWDER, FOR SUSPENSION ORAL 2 TIMES DAILY
Qty: 90 PACKET | Refills: 3 | Status: SHIPPED | OUTPATIENT
Start: 2020-09-15 | End: 2021-01-04

## 2020-09-15 ASSESSMENT — ENCOUNTER SYMPTOMS
ANAL BLEEDING: 0
ABDOMINAL DISTENTION: 0
ABDOMINAL PAIN: 0
RESPIRATORY NEGATIVE: 1
EYES NEGATIVE: 1
VOMITING: 0
BLOOD IN STOOL: 0
CONSTIPATION: 0
DIARRHEA: 1
RECTAL PAIN: 0
NAUSEA: 0

## 2020-09-15 NOTE — PROGRESS NOTES
Gastroenterology Clinic Visit    Valorie Mondragon  [id number]  Chief Complaint   Patient presents with    Consultation     Dysphagia, diarrhea. Denies blood in stools, but does note that they can be oily/mucus. HPI: 66 y.o. female presents to the clinic with history of dysphagia for solids and liquids for the last many years. Patient has to drink water to facilitate swallowing of solid food. ,  No history of any nasal regurgitation of liquids. she has heartburn which is controlled with Protonix. Patient had endoscopic evaluation in the past which were reportedly unremarkable, patient states that she had EGD and dilation by Dr. Wan Heath in [facility], she also had pH monitoring by [facility] which was negative, also has a history of altered bowel habits predominantly diarrhea. Patient experience urge to go to bathroom after a meal and it is watery, no nausea no vomiting, she has been taking fiber supplements.,  No history of any rectal bleeding. 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 No history of any weight loss, patient states that diarrhea happens usually after a full meal, he also has a craving for sugary diet. She also reports vague poorly localized abdominal discomfort. patient also reports occasional fecal incontinence and had seen soiling of her pad feels cold but no history of any fever, appetite is decreased, social history does not smoke does not drink any alcohol. Review of Systems   Constitutional: Negative. HENT: Negative. Eyes: Negative. Respiratory: Negative. Cardiovascular: Negative. Cardiac status is stable   Gastrointestinal: Positive for diarrhea. Negative for abdominal distention, abdominal pain, anal bleeding, blood in stool, constipation, nausea, rectal pain and vomiting. Dysphagia   Endocrine: Negative. Genitourinary: Negative.     Musculoskeletal: Negative. Skin: Negative. Allergic/Immunologic: Negative for food allergies. Neurological: Negative. Hematological: Negative. Psychiatric/Behavioral: Negative. Past Medical History:   Diagnosis Date    Anticoagulant long-term use     Carotid artery stenosis     Family history of early CAD 6/28/2016    Fibromyalgia     GERD (gastroesophageal reflux disease)     Hypertension     Neuropathy     Sleep apnea     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 myelopathy or radiculopathy       No past surgical history on file. Current Outpatient Medications on File Prior to Visit   Medication Sig Dispense Refill    primidone (MYSOLINE) 50 MG tablet Take 0.5 tablets by mouth nightly 16 tablet 3    aspirin EC 81 MG EC tablet Take 1 tablet by mouth daily 90 tablet 3    Wheat Dextrin (BENEFIBER) POWD Start with 2 to 3 tsp daily with 8 oz of water, titrate based on stool consistency, titrate down based on bloating & abdominal cramps 1 Can 3    pantoprazole (PROTONIX) 40 MG tablet Take 1 tablet by mouth 2 times daily (before meals) 60 tablet 5    oxymetazoline (12 HOUR NASAL SPRAY) 0.05 % nasal spray 2 sprays by Nasal route 2 times daily 1 Bottle 3     No current facility-administered medications on file prior to visit.       Family History   Problem Relation Age of Onset    Crohn's Disease Sister     Colon Cancer Neg Hx       Social History     Socioeconomic History    Marital status:      Spouse name: None    Number of children: None    Years of education: None    Highest education level: None   Occupational History    None   Social Needs    Financial resource strain: None    Food insecurity     Worry: None     Inability: None    Transportation needs     Medical: None     Non-medical: None   Tobacco Use    Smoking status: Never Smoker    Smokeless tobacco: Never Used   Substance and Sexual Activity    Alcohol use: No    Drug use: None    Sexual activity: None   Lifestyle    Physical activity     Days per week: None     Minutes per session: None    Stress: None   Relationships    Social connections     Talks on phone: None     Gets together: None     Attends Mandaeism service: None     Active member of club or organization: None     Attends meetings of clubs or organizations: None     Relationship status: None    Intimate partner violence     Fear of current or ex partner: None     Emotionally abused: None     Physically abused: None     Forced sexual activity: None   Other Topics Concern    None   Social History Narrative    None       Blood pressure 116/82, pulse 84, resp. rate 16, height 5' 5\" (1.651 m), weight 156 lb (70.8 kg), SpO2 96 %. Physical Exam  Constitutional:       Appearance: She is well-developed. HENT:      Head: Normocephalic and atraumatic. Eyes:      Conjunctiva/sclera: Conjunctivae normal.      Pupils: Pupils are equal, round, and reactive to light. Neck:      Musculoskeletal: Normal range of motion. Cardiovascular:      Rate and Rhythm: Normal rate. Pulmonary:      Effort: Pulmonary effort is normal.   Abdominal:      General: Bowel sounds are normal.      Palpations: Abdomen is soft. Comments: Surgical scar. Musculoskeletal: Normal range of motion. Skin:     General: Skin is warm. Neurological:      Mental Status: She is alert.          Laboratory, Pathology, Radiology reviewed indetail with relevant important investigations summarized below:  Lab Results   Component Value Date    WBC 6.9 09/04/2020    HGB 13.2 09/04/2020    HCT 40.2 09/04/2020    MCV 90.1 09/04/2020     09/04/2020     Lab Results   Component Value Date    ALT 15 09/04/2020    AST 20 09/04/2020    ALKPHOS 91 09/04/2020    BILITOT 0.3 09/04/2020       Xr Hand Left (min 3 Views)    Result Date: 9/4/2020  X-RAY:  RIGHT HAND, 3 VIEW(S) COMPARISONS:  NONE CLINICAL HISTORY: M79.641 Pain in both hands ICD10 , right greater than left FINDINGS:  No acute fracture, dislocation or destructive osseous abnormality is seen. There is marginal spurring at the first [facility] joint, navicular multangular joint, DIP, PIP, first IP, first MCP joints. There is mild straightening of the second and third MCP joints. Spurring is most pronounced at the first [facility] joint and first MCP joint. There is mild subluxation of both of these joints. First [facility] joint is narrowed. DEGENERATIVE CHANGES WITHOUT EVIDENCE OF FRACTURE OR DISLOCATION. X-RAY:  LEFT HAND, 3 VIEW(S) COMPARISONS:  NONE CLINICAL HISTORY: M79.641 Pain in both hands ICD10 FINDINGS:  No acute fracture, dislocation or destructive osseous abnormality is seen. There is marginal spurring of first [facility] joint, navicular multangular joint space, DIP, PIP joints. Mild marginal spurring first MCP joint. First [facility] joint is narrowed. IMPRESSION:  DEGENERATIVE CHANGES WITHOUT EVIDENCE OF FRACTURE OR DISLOCATION. DEGENERATIVE CHANGES APPEAR WORSE IN THE RIGHT HAND. DEGENERATIVE CHANGES ARE MOST PRONOUNCED WITHIN THE FIRST [facility] JOINT. Xr Hand Right (min 3 Views)    Result Date: 9/4/2020  X-RAY:  RIGHT HAND, 3 VIEW(S) COMPARISONS:  NONE CLINICAL HISTORY: M79.641 Pain in both hands ICD10 , right greater than left FINDINGS:  No acute fracture, dislocation or destructive osseous abnormality is seen. There is marginal spurring at the first [facility] joint, navicular multangular joint, DIP, PIP, first IP, first MCP joints. There is mild straightening of the second and third MCP joints. Spurring is most pronounced at the first [facility] joint and first MCP joint. There is mild subluxation of both of these joints. First [facility] joint is narrowed. DEGENERATIVE CHANGES WITHOUT EVIDENCE OF FRACTURE OR DISLOCATION. X-RAY:  LEFT HAND, 3 VIEW(S) COMPARISONS:  NONE CLINICAL HISTORY: M79.641 Pain in both hands ICD10 FINDINGS:  No acute fracture, dislocation or destructive osseous abnormality is seen.   There is marginal spurring of first [facility] joint, navicular multangular joint space, DIP, PIP joints. Mild marginal spurring first MCP joint. First [facility] joint is narrowed. IMPRESSION:  DEGENERATIVE CHANGES WITHOUT EVIDENCE OF FRACTURE OR DISLOCATION. DEGENERATIVE CHANGES APPEAR WORSE IN THE RIGHT HAND. DEGENERATIVE CHANGES ARE MOST PRONOUNCED WITHIN THE FIRST [facility] JOINT. Endoscopic investigations:     Assessmentand Plan:  66 y.o. female with history of dysphagia dating back to many years and also has diarrhea with occasional fecal incontinence. Patient is requesting EGD and colonoscopy. Had polyps removed in the past.  Am not sure patient was investigated for microscopic colitis. Will schedule EGD and colonoscopy and try cholestyramine. Recent CBC and LFT were unremarkable   Diagnosis Orders   1. Esophageal dysphagia  EGD   2. Diarrhea, unspecified type  Endoscopy, colon, diagnostic     Return in about 4 weeks (around 10/13/2020). Amina Collazo MD   Staff Gastroenterologist  [facility]    Please note this report has been partially produced using speech recognition software and may cause contain errors related to thatsystem including grammar, punctuation and spelling as well as words and phrases that may seem inappropriate. If there are questions or concerns please feel free to contact me to clarify.

## 2020-09-17 LAB
SARS-COV-2: NOT DETECTED
SOURCE: NORMAL

## 2020-09-21 ENCOUNTER — OFFICE VISIT (OUTPATIENT)
Dept: FAMILY MEDICINE CLINIC | Age: 75
End: 2020-09-21
Payer: MEDICARE

## 2020-09-21 VITALS
RESPIRATION RATE: 14 BRPM | HEART RATE: 86 BPM | HEIGHT: 65 IN | TEMPERATURE: 97 F | DIASTOLIC BLOOD PRESSURE: 86 MMHG | SYSTOLIC BLOOD PRESSURE: 140 MMHG | WEIGHT: 153 LBS | BODY MASS INDEX: 25.49 KG/M2 | OXYGEN SATURATION: 97 %

## 2020-09-21 PROCEDURE — 1090F PRES/ABSN URINE INCON ASSESS: CPT | Performed by: INTERNAL MEDICINE

## 2020-09-21 PROCEDURE — 99213 OFFICE O/P EST LOW 20 MIN: CPT | Performed by: INTERNAL MEDICINE

## 2020-09-21 PROCEDURE — 1123F ACP DISCUSS/DSCN MKR DOCD: CPT | Performed by: INTERNAL MEDICINE

## 2020-09-21 PROCEDURE — 4040F PNEUMOC VAC/ADMIN/RCVD: CPT | Performed by: INTERNAL MEDICINE

## 2020-09-21 PROCEDURE — G8417 CALC BMI ABV UP PARAM F/U: HCPCS | Performed by: INTERNAL MEDICINE

## 2020-09-21 PROCEDURE — G8400 PT W/DXA NO RESULTS DOC: HCPCS | Performed by: INTERNAL MEDICINE

## 2020-09-21 PROCEDURE — G8427 DOCREV CUR MEDS BY ELIG CLIN: HCPCS | Performed by: INTERNAL MEDICINE

## 2020-09-21 PROCEDURE — 1036F TOBACCO NON-USER: CPT | Performed by: INTERNAL MEDICINE

## 2020-09-21 RX ORDER — PREDNISOLONE ACETATE 10 MG/ML
1 SUSPENSION/ DROPS OPHTHALMIC EVERY 4 HOURS
Status: ON HOLD | COMMUNITY
Start: 2020-09-15 | End: 2022-01-27

## 2020-09-21 ASSESSMENT — ENCOUNTER SYMPTOMS
SORE THROAT: 0
SHORTNESS OF BREATH: 0
PHOTOPHOBIA: 0
EYE PAIN: 0
TROUBLE SWALLOWING: 0
VOMITING: 0
NAUSEA: 0
EYE REDNESS: 0
RECTAL PAIN: 0
APNEA: 0
BACK PAIN: 0
BLOOD IN STOOL: 0
COLOR CHANGE: 0
RHINORRHEA: 0
COUGH: 0
ABDOMINAL PAIN: 0
FACIAL SWELLING: 0
VOICE CHANGE: 0
CHEST TIGHTNESS: 0
SINUS PRESSURE: 0
CONSTIPATION: 0
WHEEZING: 0
SINUS PAIN: 0
EYE DISCHARGE: 0
DIARRHEA: 0
EYE ITCHING: 0
ABDOMINAL DISTENTION: 0

## 2020-09-21 NOTE — PROGRESS NOTES
Myalgia       Current Outpatient Medications on File Prior to Visit   Medication Sig Dispense Refill    prednisoLONE acetate (PRED FORTE) 1 % ophthalmic suspension INSTILL 1 DROP INTO RIGHT EYE 6 TIMES A DAY FOR 2 DAYS THEN DECREASE TO 4 TIMES A DAY      Na Sulfate-K Sulfate-Mg Sulf 17.5-3.13-1.6 GM/177ML SOLN As directed 1 Bottle 0    cholestyramine (QUESTRAN) 4 g packet Take 1 packet by mouth 2 times daily 90 packet 3    primidone (MYSOLINE) 50 MG tablet Take 0.5 tablets by mouth nightly 16 tablet 3    aspirin EC 81 MG EC tablet Take 1 tablet by mouth daily 90 tablet 3    Wheat Dextrin (BENEFIBER) POWD Start with 2 to 3 tsp daily with 8 oz of water, titrate based on stool consistency, titrate down based on bloating & abdominal cramps 1 Can 3    pantoprazole (PROTONIX) 40 MG tablet Take 1 tablet by mouth 2 times daily (before meals) 60 tablet 5    oxymetazoline (12 HOUR NASAL SPRAY) 0.05 % nasal spray 2 sprays by Nasal route 2 times daily 1 Bottle 3     No current facility-administered medications on file prior to visit. Review of Systems   Constitutional: Negative for chills, diaphoresis, fatigue and fever. HENT: Negative for congestion, dental problem, drooling, ear discharge, ear pain, facial swelling, hearing loss, mouth sores, nosebleeds, postnasal drip, rhinorrhea, sinus pressure, sinus pain, sneezing, sore throat, tinnitus, trouble swallowing and voice change. Eyes: Negative for photophobia, pain, discharge, redness, itching and visual disturbance. Respiratory: Negative for apnea, cough, chest tightness, shortness of breath and wheezing. Cardiovascular: Negative for chest pain, palpitations and leg swelling. Gastrointestinal: Negative for abdominal distention, abdominal pain, blood in stool, constipation, diarrhea, nausea, rectal pain and vomiting. Endocrine: Negative for cold intolerance, heat intolerance, polydipsia, polyphagia and polyuria.    Genitourinary: Negative for time.       POC Testing Today:   No results found for this visit on 09/21/20. Assessment and Plan    Benita Noble was seen today for hand pain. Diagnoses and all orders for this visit:    Pain in both hands    Diarrhea, unspecified type    The patient is scheduled to undergo an endoscopy tomorrow. Return in about 3 months (around 12/21/2020).       Julienne Nayak MD

## 2020-09-22 ENCOUNTER — ANCILLARY PROCEDURE (OUTPATIENT)
Dept: ENDOSCOPY | Age: 75
End: 2020-09-22
Attending: SPECIALIST
Payer: MEDICARE

## 2020-09-22 ENCOUNTER — ANESTHESIA EVENT (OUTPATIENT)
Dept: ENDOSCOPY | Age: 75
End: 2020-09-22
Payer: MEDICARE

## 2020-09-22 ENCOUNTER — HOSPITAL ENCOUNTER (OUTPATIENT)
Age: 75
Setting detail: OUTPATIENT SURGERY
Discharge: HOME | End: 2020-09-22
Attending: SPECIALIST | Admitting: SPECIALIST
Payer: MEDICARE

## 2020-09-22 ENCOUNTER — ANESTHESIA (OUTPATIENT)
Dept: ENDOSCOPY | Age: 75
End: 2020-09-22
Payer: MEDICARE

## 2020-09-22 VITALS
SYSTOLIC BLOOD PRESSURE: 150 MMHG | RESPIRATION RATE: 20 BRPM | DIASTOLIC BLOOD PRESSURE: 73 MMHG | OXYGEN SATURATION: 98 %

## 2020-09-22 VITALS
DIASTOLIC BLOOD PRESSURE: 81 MMHG | HEIGHT: 65 IN | WEIGHT: 150 LBS | OXYGEN SATURATION: 97 % | TEMPERATURE: 97.7 F | BODY MASS INDEX: 24.99 KG/M2 | RESPIRATION RATE: 16 BRPM | HEART RATE: 83 BPM | SYSTOLIC BLOOD PRESSURE: 147 MMHG

## 2020-09-22 PROCEDURE — 7100000011 HC PHASE II RECOVERY - ADDTL 15 MIN: Performed by: SPECIALIST

## 2020-09-22 PROCEDURE — 3700000001 HC ADD 15 MINUTES (ANESTHESIA): Performed by: SPECIALIST

## 2020-09-22 PROCEDURE — 2580000003 HC RX 258: Performed by: NURSE ANESTHETIST, CERTIFIED REGISTERED

## 2020-09-22 PROCEDURE — 2500000003 HC RX 250 WO HCPCS: Performed by: NURSE ANESTHETIST, CERTIFIED REGISTERED

## 2020-09-22 PROCEDURE — 3700000000 HC ANESTHESIA ATTENDED CARE: Performed by: SPECIALIST

## 2020-09-22 PROCEDURE — 3609017100 HC EGD: Performed by: SPECIALIST

## 2020-09-22 PROCEDURE — C1769 GUIDE WIRE: HCPCS | Performed by: SPECIALIST

## 2020-09-22 PROCEDURE — 2580000003 HC RX 258

## 2020-09-22 PROCEDURE — 88342 IMHCHEM/IMCYTCHM 1ST ANTB: CPT

## 2020-09-22 PROCEDURE — 7100000010 HC PHASE II RECOVERY - FIRST 15 MIN: Performed by: SPECIALIST

## 2020-09-22 PROCEDURE — 88305 TISSUE EXAM BY PATHOLOGIST: CPT

## 2020-09-22 PROCEDURE — 43251 EGD REMOVE LESION SNARE: CPT | Performed by: SPECIALIST

## 2020-09-22 PROCEDURE — 6360000002 HC RX W HCPCS: Performed by: NURSE ANESTHETIST, CERTIFIED REGISTERED

## 2020-09-22 PROCEDURE — 45380 COLONOSCOPY AND BIOPSY: CPT | Performed by: SPECIALIST

## 2020-09-22 PROCEDURE — 2709999900 HC NON-CHARGEABLE SUPPLY: Performed by: SPECIALIST

## 2020-09-22 PROCEDURE — 2720000010 HC SURG SUPPLY STERILE: Performed by: SPECIALIST

## 2020-09-22 PROCEDURE — 45385 COLONOSCOPY W/LESION REMOVAL: CPT | Performed by: SPECIALIST

## 2020-09-22 PROCEDURE — 2580000003 HC RX 258: Performed by: SPECIALIST

## 2020-09-22 PROCEDURE — 6370000000 HC RX 637 (ALT 250 FOR IP): Performed by: SPECIALIST

## 2020-09-22 PROCEDURE — 43248 EGD GUIDE WIRE INSERTION: CPT | Performed by: SPECIALIST

## 2020-09-22 PROCEDURE — 3609027000 HC COLONOSCOPY: Performed by: SPECIALIST

## 2020-09-22 RX ORDER — MAGNESIUM HYDROXIDE 1200 MG/15ML
LIQUID ORAL PRN
Status: DISCONTINUED | OUTPATIENT
Start: 2020-09-22 | End: 2020-09-22 | Stop reason: ALTCHOICE

## 2020-09-22 RX ORDER — GLYCOPYRROLATE 1 MG/5 ML
SYRINGE (ML) INTRAVENOUS PRN
Status: DISCONTINUED | OUTPATIENT
Start: 2020-09-22 | End: 2020-09-22 | Stop reason: SDUPTHER

## 2020-09-22 RX ORDER — PROPOFOL 10 MG/ML
INJECTION, EMULSION INTRAVENOUS CONTINUOUS PRN
Status: DISCONTINUED | OUTPATIENT
Start: 2020-09-22 | End: 2020-09-22 | Stop reason: SDUPTHER

## 2020-09-22 RX ORDER — SODIUM CHLORIDE 9 MG/ML
INJECTION, SOLUTION INTRAVENOUS CONTINUOUS PRN
Status: DISCONTINUED | OUTPATIENT
Start: 2020-09-22 | End: 2020-09-22 | Stop reason: SDUPTHER

## 2020-09-22 RX ORDER — SIMETHICONE 20 MG/.3ML
EMULSION ORAL PRN
Status: DISCONTINUED | OUTPATIENT
Start: 2020-09-22 | End: 2020-09-22 | Stop reason: ALTCHOICE

## 2020-09-22 RX ORDER — LIDOCAINE HYDROCHLORIDE 20 MG/ML
INJECTION, SOLUTION INFILTRATION; PERINEURAL PRN
Status: DISCONTINUED | OUTPATIENT
Start: 2020-09-22 | End: 2020-09-22 | Stop reason: SDUPTHER

## 2020-09-22 RX ORDER — SODIUM CHLORIDE 9 MG/ML
INJECTION, SOLUTION INTRAVENOUS
Status: COMPLETED
Start: 2020-09-22 | End: 2020-09-22

## 2020-09-22 RX ORDER — 0.9 % SODIUM CHLORIDE 0.9 %
500 INTRAVENOUS SOLUTION INTRAVENOUS ONCE
Status: COMPLETED | OUTPATIENT
Start: 2020-09-22 | End: 2020-09-22

## 2020-09-22 RX ADMIN — PHENYLEPHRINE HYDROCHLORIDE 100 MCG: 10 INJECTION INTRAVENOUS at 09:38

## 2020-09-22 RX ADMIN — LIDOCAINE HYDROCHLORIDE 60 MG: 20 INJECTION, SOLUTION INFILTRATION; PERINEURAL at 09:14

## 2020-09-22 RX ADMIN — PHENYLEPHRINE HYDROCHLORIDE 100 MCG: 10 INJECTION INTRAVENOUS at 09:35

## 2020-09-22 RX ADMIN — SODIUM CHLORIDE: 9 INJECTION, SOLUTION INTRAVENOUS at 09:13

## 2020-09-22 RX ADMIN — PHENYLEPHRINE HYDROCHLORIDE 100 MCG: 10 INJECTION INTRAVENOUS at 09:32

## 2020-09-22 RX ADMIN — Medication 500 ML: at 08:48

## 2020-09-22 RX ADMIN — PHENYLEPHRINE HYDROCHLORIDE 100 MCG: 10 INJECTION INTRAVENOUS at 09:41

## 2020-09-22 RX ADMIN — SODIUM CHLORIDE 500 ML: 9 INJECTION, SOLUTION INTRAVENOUS at 08:48

## 2020-09-22 RX ADMIN — Medication 0.2 MG: at 09:44

## 2020-09-22 RX ADMIN — PROPOFOL 100 MCG/KG/MIN: 10 INJECTION, EMULSION INTRAVENOUS at 09:14

## 2020-09-22 ASSESSMENT — PAIN DESCRIPTION - PAIN TYPE: TYPE: ACUTE PAIN

## 2020-09-22 ASSESSMENT — PAIN DESCRIPTION - LOCATION: LOCATION: ABDOMEN

## 2020-09-22 ASSESSMENT — PAIN SCALES - GENERAL: PAINLEVEL_OUTOF10: 5

## 2020-09-22 NOTE — H&P
Patient Name: Cleopatra Lindsay  : 1945  MRN: [id number]  DATE: 20      ENDOSCOPY  History and Physical    Procedure:    [x] Diagnostic Colonoscopy       [] Screening Colonoscopy  [] EGD      [] ERCP      [] EUS       [] Other    [x] Previous office notes/History and Physical reviewed from the patients chart. Please see EMR for further details of HPI. I have examined the patient's status immediately prior to the procedure and:      Indications/HPI:    []Abdominal Pain  []Cancer- GI/Lung  []Fhx of colon CA/polyps  []History of Polyps  []Shipleys   []Melena  []Abnormal Imaging  []Dysphagia    []Persistent Pneumonia  []Anemia  []Food Impaction  []History of Polyps  []GI Bleed  []Pulmonary nodule/Mass  []Change in bowel habits []Heartburn/Reflux  []Rectal Bleed (BRBPR)  []Chest Pain - Non Cardiac []Heme (+) Stoo  l[]Ulcers  []Constipation  []Hemoptysis   []Varices  [x]Diarrhea  []Hypoxemia  []Nausea/Vomiting  []Screening   []Crohns/Colitis  []Other:     Anesthesia:   [x] MAC [] Moderate Sedation   [] General   [] None     ROS: 12 pt Review of Symptoms was negative unless mentioned above    Medications:   Prior to Admission medications    Medication Sig Start Date End Date Taking?  Authorizing Provider   prednisoLONE acetate (PRED FORTE) 1 % ophthalmic suspension INSTILL 1 DROP INTO RIGHT EYE 6 TIMES A DAY FOR 2 DAYS THEN DECREASE TO 4 TIMES A DAY 9/15/20  Yes Historical Provider, MD   Na Sulfate-K Sulfate-Mg Sulf 17.5-3.13-1.6 GM/177ML SOLN As directed 9/15/20  Yes Teetee Alcala MD   Wheat Dextrin ([place]) POWD Start with 2 to 3 tsp daily with 8 oz of water, titrate based on stool consistency, titrate down based on bloating & abdominal cramps 20  Yes Princess Gomez MD   pantoprazole (PROTONIX) 40 MG tablet Take 1 tablet by mouth 2 times daily (before meals) 20  Yes Hal Calvert MD   cholestyramine Clavgavin Ruiz) 4 g packet Take 1 packet by mouth 2 times daily 9/15/20   Teetee Alcala MD   primidone (MYSOLINE) 50 MG tablet Take 0.5 tablets by mouth nightly 9/4/20   Gomez Mills MD   aspirin EC 81 MG EC tablet Take 1 tablet by mouth daily 9/2/20   Candi Dillon MD   oxymetazoline (12 HOUR NASAL SPRAY) 0.05 % nasal spray 2 sprays by Nasal route 2 times daily 1/7/20 1/6/21  Gomez Mills MD       Allergies:    Allergies   Allergen Reactions    Latex     Amoxicillin-Pot Clavulanate      Other reaction(s): Vomiting    Carbidopa-Levodopa      Other reaction(s): Unknown  Effects the eyes    Iv [Iodides]     Morphine Other (See Comments)     Made her go 'berserk '    Nitrofurantoin Other (See Comments)     Myalgia    Plavix [Clopidogrel Bisulfate]     Pravastatin Other (See Comments)     Myalgia        History of allergic reaction to anesthesia:  No    Past Medical History:  Past Medical History:   Diagnosis Date    Anticoagulant long-term use     Carotid artery stenosis     Family history of early CAD 6/28/2016    Fibromyalgia     GERD (gastroesophageal reflux disease)     Hypertension     Neuropathy     Sleep apnea     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 myelopathy or radiculopathy        Past Surgical History:  Past Surgical History:   Procedure Laterality Date    BLADDER SURGERY      stimulator implant    BLADDER SUSPENSION      CHOLECYSTECTOMY      HYSTERECTOMY      INNER EAR SURGERY         Social History:  Social History     Tobacco Use    Smoking status: Never Smoker    Smokeless tobacco: Never Used   Substance Use Topics    Alcohol use: No    Drug use: Never       Vital Signs:   Vitals:    09/22/20 0843   BP: 133/63   Pulse: 73   Resp: 16   Temp: 97.7 °F (36.5 °C)   SpO2: 93%        Physical Exam:  Cardiac:  [x]WNL  []Comments:  Pulmonary:  [x]WNL   []Comments:   Neuro/Mental Status:  [x]WNL  []Comments:  Abdominal:  [x]WNL    []Comments:  Other:   []WNL  []Comments:    Informed Consent:  The risks and benefits of the procedure have been discussed with either the patient or if they cannot consent, their representative. Assessment:  Patient examined and appropriate for planned sedation and procedure. Plan:  Proceed with planned sedation and procedure as above.     Claudia Laureano MD  9:02 AM

## 2020-09-22 NOTE — H&P
Patient Name: Yossi Valentin  : 1945  MRN: [id number]  DATE: 20      ENDOSCOPY  History and Physical    Procedure:    [] Diagnostic Colonoscopy       [] Screening Colonoscopy  [x] EGD      [] ERCP      [] EUS       [] Other    [x] Previous office notes/History and Physical reviewed from the patients chart. Please see EMR for further details of HPI. I have examined the patient's status immediately prior to the procedure and:      Indications/HPI:    []Abdominal Pain  []Cancer- GI/Lung  []Fhx of colon CA/polyps  []History of Polyps  []Shipleys   []Melena  []Abnormal Imaging  [x]Dysphagia    []Persistent Pneumonia  []Anemia  []Food Impaction  []History of Polyps  []GI Bleed  []Pulmonary nodule/Mass  []Change in bowel habits []Heartburn/Reflux  []Rectal Bleed (BRBPR)  []Chest Pain - Non Cardiac []Heme (+) Stoo  l[]Ulcers  []Constipation  []Hemoptysis   []Varices  []Diarrhea  []Hypoxemia  []Nausea/Vomiting  []Screening   []Crohns/Colitis  []Other:     Anesthesia:   [x] MAC [] Moderate Sedation   [] General   [] None     ROS: 12 pt Review of Symptoms was negative unless mentioned above    Medications:   Prior to Admission medications    Medication Sig Start Date End Date Taking?  Authorizing Provider   prednisoLONE acetate (PRED FORTE) 1 % ophthalmic suspension INSTILL 1 DROP INTO RIGHT EYE 6 TIMES A DAY FOR 2 DAYS THEN DECREASE TO 4 TIMES A DAY 9/15/20  Yes Historical Provider, MD   Na Sulfate-K Sulfate-Mg Sulf 17.5-3.13-1.6 GM/177ML SOLN As directed 9/15/20  Yes Shahbaz Barfield MD   Wheat Dextrin (Lynda Juliana) POWD Start with 2 to 3 tsp daily with 8 oz of water, titrate based on stool consistency, titrate down based on bloating & abdominal cramps 20  Yes Sadi Grant MD   pantoprazole (PROTONIX) 40 MG tablet Take 1 tablet by mouth 2 times daily (before meals) 20  Yes Robbin Nazario MD   cholestyramine Leti Eric) 4 g packet Take 1 packet by mouth 2 times daily 9/15/20   Shahbaz Barfield MD   primidone (MYSOLINE) 50 MG tablet Take 0.5 tablets by mouth nightly 9/4/20   French Riley MD   aspirin EC 81 MG EC tablet Take 1 tablet by mouth daily 9/2/20   Winifred Rubio MD   oxymetazoline (12 HOUR NASAL SPRAY) 0.05 % nasal spray 2 sprays by Nasal route 2 times daily 1/7/20 1/6/21  French Riley MD       Allergies:    Allergies   Allergen Reactions    Latex     Amoxicillin-Pot Clavulanate      Other reaction(s): Vomiting    Carbidopa-Levodopa      Other reaction(s): Unknown  Effects the eyes    Iv [Iodides]     Morphine Other (See Comments)     Made her go 'berserk '    Nitrofurantoin Other (See Comments)     Myalgia    Plavix [Clopidogrel Bisulfate]     Pravastatin Other (See Comments)     Myalgia        History of allergic reaction to anesthesia:  No    Past Medical History:  Past Medical History:   Diagnosis Date    Anticoagulant long-term use     Carotid artery stenosis     Family history of early CAD 6/28/2016    Fibromyalgia     GERD (gastroesophageal reflux disease)     Hypertension     Neuropathy     Sleep apnea     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 myelopathy or radiculopathy        Past Surgical History:  Past Surgical History:   Procedure Laterality Date    BLADDER SURGERY      stimulator implant    BLADDER SUSPENSION      CHOLECYSTECTOMY      HYSTERECTOMY      INNER EAR SURGERY         Social History:  Social History     Tobacco Use    Smoking status: Never Smoker    Smokeless tobacco: Never Used   Substance Use Topics    Alcohol use: No    Drug use: Never       Vital Signs:   Vitals:    09/22/20 0843   BP: 133/63   Pulse: 73   Resp: 16   Temp: 97.7 °F (36.5 °C)   SpO2: 93%        Physical Exam:  Cardiac:  [x]WNL  []Comments:  Pulmonary:  [x]WNL   []Comments:   Neuro/Mental Status:  [x]WNL  []Comments:  Abdominal:  [x]WNL    []Comments:  Other:   []WNL  []Comments:    Informed Consent:  The risks and benefits of the procedure have been discussed with either the patient or if they cannot consent, their representative. Assessment:  Patient examined and appropriate for planned sedation and procedure. Plan:  Proceed with planned sedation and procedure as above.     Steff Cyr MD  8:54 AM

## 2020-09-22 NOTE — ANESTHESIA PRE PROCEDURE
Department of Anesthesiology  Preprocedure Note       Name:  Taye Javier   Age:  66 y.o.  :  1945                                          MRN:  [id number]         Date:  2020      Surgeon: Emily Mcclendon):  Chano Douglas MD    Procedure: Procedure(s):  EGD DIAGNOSTIC ONLY  COLONOSCOPY DIAGNOSTIC    Medications prior to admission:   Prior to Admission medications    Medication Sig Start Date End Date Taking? Authorizing Provider   prednisoLONE acetate (PRED FORTE) 1 % ophthalmic suspension INSTILL 1 DROP INTO RIGHT EYE 6 TIMES A DAY FOR 2 DAYS THEN DECREASE TO 4 TIMES A DAY 9/15/20  Yes Historical Provider, MD   Na Sulfate-K Sulfate-Mg Sulf 17.5-3.13-1.6 GM/177ML SOLN As directed 9/15/20  Yes Chano Douglas MD   Wheat Dextrin (Eden Ringer) POWD Start with 2 to 3 tsp daily with 8 oz of water, titrate based on stool consistency, titrate down based on bloating & abdominal cramps 20  Yes Osvaldo Lovell MD   pantoprazole (PROTONIX) 40 MG tablet Take 1 tablet by mouth 2 times daily (before meals) 20  Yes Candi Dillon MD   cholestyramine Bobbi Duncans) 4 g packet Take 1 packet by mouth 2 times daily 9/15/20   Chano Douglas MD   primidone (MYSOLINE) 50 MG tablet Take 0.5 tablets by mouth nightly 20   Gomez Mills MD   aspirin EC 81 MG EC tablet Take 1 tablet by mouth daily 20   Candi Dillon MD   oxymetazoline (12 HOUR NASAL SPRAY) 0.05 % nasal spray 2 sprays by Nasal route 2 times daily 20  Gomez Mills MD       Current medications:    Current Facility-Administered Medications   Medication Dose Route Frequency Provider Last Rate Last Dose    0.9 % sodium chloride bolus  500 mL Intravenous Once Chano Douglas  mL/hr at 20 0848 500 mL at 20 0848       Allergies:     Allergies   Allergen Reactions    Latex     Amoxicillin-Pot Clavulanate      Other reaction(s): Vomiting    Carbidopa-Levodopa      Other reaction(s): Unknown  Effects the eyes    Iv [Iodides]  Morphine Other (See Comments)     Made her go 'berserk '    Nitrofurantoin Other (See Comments)     Myalgia    Plavix [Clopidogrel Bisulfate]     Pravastatin Other (See Comments)     Myalgia       Problem List:    Patient Active Problem List   Diagnosis Code    Dysphagia R13.10    Arterial fibromuscular dysplasia (HCC) I77.3    Fibromyalgia M79.7    Gastroesophageal reflux disease K21.9    Hypertension I10    Obstructive sleep apnea syndrome G47.33    Peripheral neuropathy (HCC) G62.9    Varicose veins of lower extremity I83.90    Chest pain R07.9    BEATRICE (obstructive sleep apnea) G47.33    Stenosis of right carotid artery I65.21    Dyslipidemia E78.5    BEATRICE on CPAP G47.33, Z99.89    Essential hypertension I10    Dizzy R42       Past Medical History:        Diagnosis Date    Anticoagulant long-term use     Carotid artery stenosis     Family history of early CAD 6/28/2016    Fibromyalgia     GERD (gastroesophageal reflux disease)     Hypertension     Neuropathy     Sleep apnea     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 myelopathy or radiculopathy        Past Surgical History:        Procedure Laterality Date    BLADDER SURGERY      stimulator implant    BLADDER SUSPENSION      CHOLECYSTECTOMY      HYSTERECTOMY      INNER EAR SURGERY         Social History:    Social History     Tobacco Use    Smoking status: Never Smoker    Smokeless tobacco: Never Used   Substance Use Topics    Alcohol use:  No                                Counseling given: Not Answered      Vital Signs (Current):   Vitals:    09/22/20 0843   BP: 133/63   Pulse: 73   Resp: 16   Temp: 36.5 °C (97.7 °F)   TempSrc: Temporal   SpO2: 93%   Weight: 150 lb (68 kg)   Height: 5' 5\" (1.651 m)                                              BP Readings from Last 3 Encounters:   09/22/20 133/63   09/21/20 (!) 140/86   09/15/20 116/82       NPO Status: Time of last liquid consumption: 0615                        Time of last solid consumption: 1400                        Date of last liquid consumption: 09/22/20                        Date of last solid food consumption: 09/20/20    BMI:   Wt Readings from Last 3 Encounters:   09/22/20 150 lb (68 kg)   09/21/20 153 lb (69.4 kg)   09/15/20 156 lb (70.8 kg)     Body mass index is 24.96 kg/m². CBC:   Lab Results   Component Value Date    WBC 6.9 09/04/2020    RBC 4.46 09/04/2020    RBC 4.35 06/01/2012    HGB 13.2 09/04/2020    HCT 40.2 09/04/2020    MCV 90.1 09/04/2020    RDW 14.2 09/04/2020     09/04/2020       CMP:   Lab Results   Component Value Date     09/04/2020    K 3.9 09/04/2020     09/04/2020    CO2 25 09/04/2020    BUN 11 09/04/2020    CREATININE 0.65 09/04/2020    GFRAA >60.0 09/04/2020    LABGLOM >60.0 09/04/2020    GLUCOSE 79 09/04/2020    GLUCOSE 139 06/01/2012    PROT 6.5 09/04/2020    CALCIUM 9.3 09/04/2020    BILITOT 0.3 09/04/2020    ALKPHOS 91 09/04/2020    AST 20 09/04/2020    ALT 15 09/04/2020       POC Tests: No results for input(s): POCGLU, POCNA, POCK, POCCL, POCBUN, POCHEMO, POCHCT in the last 72 hours.     Coags:   Lab Results   Component Value Date    PROTIME 13.0 12/26/2019    PROTIME 16.7 06/01/2012    INR 1.0 12/26/2019    APTT 30.2 12/26/2019       HCG (If Applicable): No results found for: PREGTESTUR, PREGSERUM, HCG, HCGQUANT     ABGs: No results found for: PHART, PO2ART, TVW8JFJ, SSM4XEI, BEART, D0MIDYGV     Type & Screen (If Applicable):  No results found for: LABABO, LABRH    Drug/Infectious Status (If Applicable):  No results found for: HIV, HEPCAB    COVID-19 Screening (If Applicable):   Lab Results   Component Value Date    COVID19 Not Detected 09/15/2020         Anesthesia Evaluation  Patient summary reviewed and Nursing notes reviewed  Airway: Mallampati: II  TM distance: >3 FB   Neck ROM: full  Mouth opening: > = 3 FB Dental: normal exam         Pulmonary:                              Cardiovascular: Neuro/Psych:               GI/Hepatic/Renal:             Endo/Other:                     Abdominal:           Vascular:                                        Anesthesia Plan      MAC     ASA 4             Anesthetic plan and risks discussed with patient. Use of blood products discussed with patient whom consented to blood products.                    SADAF Smith - CRNA   9/22/2020

## 2020-09-22 NOTE — ANESTHESIA POSTPROCEDURE EVALUATION
Department of Anesthesiology  Postprocedure Note    Patient: Ernestine Hernandez  MRN: [id number]  YOB: 1945  Date of evaluation: 9/22/2020  Time:  9:55 AM     Procedure Summary     Date:  09/22/20 Room / Location:  [address] [place] [address] Otto Hidalgo    Anesthesia Start:  4823 Anesthesia Stop:      Procedures:       EGD WITH POLYPECTOMY AND DILATION (N/A )      COLONOSCOPY WITH BIOPSY AND POLYPECTOMY Diagnosis:  ([id number] - Diarrhea, dysphagia)    Surgeon:  Brittani Richmond MD Responsible Provider:  SADAF Meyers CRNA    Anesthesia Type:  MAC ASA Status:  4          Anesthesia Type: MAC    Madeline Phase I:      Madeline Phase II:      Last vitals: Reviewed and per EMR flowsheets.        Anesthesia Post Evaluation    Patient location during evaluation: PACU  Patient participation: complete - patient participated  Level of consciousness: awake and alert  Pain score: 0  Airway patency: patent  Nausea & Vomiting: no nausea and no vomiting  Complications: no  Cardiovascular status: blood pressure returned to baseline  Respiratory status: spontaneous ventilation, nasal cannula, nonlabored ventilation and acceptable  Hydration status: euvolemic

## 2020-10-05 ENCOUNTER — OFFICE VISIT (OUTPATIENT)
Dept: ORTHOPEDIC SURGERY | Age: 75
End: 2020-10-05
Payer: MEDICARE

## 2020-10-05 VITALS
TEMPERATURE: 97 F | OXYGEN SATURATION: 98 % | HEIGHT: 65 IN | HEART RATE: 71 BPM | WEIGHT: 152.6 LBS | BODY MASS INDEX: 25.43 KG/M2

## 2020-10-05 PROCEDURE — 99203 OFFICE O/P NEW LOW 30 MIN: CPT | Performed by: ORTHOPAEDIC SURGERY

## 2020-10-05 PROCEDURE — 1123F ACP DISCUSS/DSCN MKR DOCD: CPT | Performed by: ORTHOPAEDIC SURGERY

## 2020-10-05 PROCEDURE — L3908 WHO COCK-UP NONMOLDE PRE OTS: HCPCS | Performed by: ORTHOPAEDIC SURGERY

## 2020-10-05 PROCEDURE — 1036F TOBACCO NON-USER: CPT | Performed by: ORTHOPAEDIC SURGERY

## 2020-10-05 PROCEDURE — G8417 CALC BMI ABV UP PARAM F/U: HCPCS | Performed by: ORTHOPAEDIC SURGERY

## 2020-10-05 PROCEDURE — 4040F PNEUMOC VAC/ADMIN/RCVD: CPT | Performed by: ORTHOPAEDIC SURGERY

## 2020-10-05 PROCEDURE — G8484 FLU IMMUNIZE NO ADMIN: HCPCS | Performed by: ORTHOPAEDIC SURGERY

## 2020-10-05 PROCEDURE — 1090F PRES/ABSN URINE INCON ASSESS: CPT | Performed by: ORTHOPAEDIC SURGERY

## 2020-10-05 PROCEDURE — G8427 DOCREV CUR MEDS BY ELIG CLIN: HCPCS | Performed by: ORTHOPAEDIC SURGERY

## 2020-10-05 PROCEDURE — G8400 PT W/DXA NO RESULTS DOC: HCPCS | Performed by: ORTHOPAEDIC SURGERY

## 2020-10-06 NOTE — PROGRESS NOTES
HYSTERECTOMY      INNER EAR SURGERY      UPPER GASTROINTESTINAL ENDOSCOPY N/A 9/22/2020    EGD WITH POLYPECTOMY AND DILATION performed by Michelle Escalante MD at [facility] Marital status:      Spouse name: Not on file    Number of children: Not on file    Years of education: Not on file    Highest education level: Not on file   Occupational History    Not on file   Social Needs    Financial resource strain: Not on file    Food insecurity     Worry: Not on file     Inability: Not on file    Transportation needs     Medical: Not on file     Non-medical: Not on file   Tobacco Use    Smoking status: Never Smoker    Smokeless tobacco: Never Used   Substance and Sexual Activity    Alcohol use: No    Drug use: Never    Sexual activity: Not on file   Lifestyle    Physical activity     Days per week: Not on file     Minutes per session: Not on file    Stress: Not on file   Relationships    Social connections     Talks on phone: Not on file     Gets together: Not on file     Attends [employer] service: Not on file     Active member of club or organization: Not on file     Attends meetings of clubs or organizations: Not on file     Relationship status: Not on file    Intimate partner violence     Fear of current or ex partner: Not on file     Emotionally abused: Not on file     Physically abused: Not on file     Forced sexual activity: Not on file   Other Topics Concern    Not on file   Social History Narrative    Not on file     Family History   Problem Relation Age of Onset    Crohn's Disease Sister     Colon Cancer Neg Hx      Allergies   Allergen Reactions    Latex     Amoxicillin-Pot Clavulanate      Other reaction(s): Vomiting    Carbidopa-Levodopa      Other reaction(s): Unknown  Effects the eyes    Iv [Iodides]     Morphine Other (See Comments)     Made her go 'berserk '    Nitrofurantoin Other (See Comments)     Myalgia    Plavix [Clopidogrel Bisulfate]     Pravastatin Other (See Comments)     Myalgia     Current Outpatient Medications on File Prior to Visit   Medication Sig Dispense Refill    prednisoLONE acetate (PRED FORTE) 1 % ophthalmic suspension INSTILL 1 DROP INTO RIGHT EYE 6 TIMES A DAY FOR 2 DAYS THEN DECREASE TO 4 TIMES A DAY      Na Sulfate-K Sulfate-Mg Sulf 17.5-3.13-1.6 GM/177ML SOLN As directed 1 Bottle 0    aspirin EC 81 MG EC tablet Take 1 tablet by mouth daily 90 tablet 3    Wheat Dextrin (BENEFIBER) POWD Start with 2 to 3 tsp daily with 8 oz of water, titrate based on stool consistency, titrate down based on bloating & abdominal cramps 1 Can 3    pantoprazole (PROTONIX) 40 MG tablet Take 1 tablet by mouth 2 times daily (before meals) 60 tablet 5    oxymetazoline (12 HOUR NASAL SPRAY) 0.05 % nasal spray 2 sprays by Nasal route 2 times daily 1 Bottle 3    cholestyramine (QUESTRAN) 4 g packet Take 1 packet by mouth 2 times daily 90 packet 3    primidone (MYSOLINE) 50 MG tablet Take 0.5 tablets by mouth nightly 16 tablet 3     No current facility-administered medications on file prior to visit. Review of Systems   General: Denies fever, chills  Cardiovascular: Denies chest pain  Pulmonary: Denies shortness of breath  GI: Denies nausea or vomiting  Neuro: Denies numbness or tingling sepsis otherwise document HPI      Objective:   Pulse 71   Temp 97 °F (36.1 °C) (Temporal)   Ht 5' 5\" (1.651 m)   Wt 152 lb 9.6 oz (69.2 kg)   SpO2 98%   BMI 25.39 kg/m²     Ortho Exam   General: Well-appearing male who appears their stated age  Left upper extremity:  Skin: Intact circumferentially, no swelling, ecchymosis or edema is appreciated  Neuro: Sensation intact light touch in the radial, ulnar and median nerve distribution. Able to perform all cardinal hand movements. Vascular: Strong palpable radial pulse, brisk cap refill in all digits.   MSK: Minimal tenderness palpation about the A1 pulleys of the fourth and fifth ray volarly. I am not able to appreciate any clicking catching or popping on flexion and extension of the digits. Patient has negative Tinel sign in both the ulnar nerve and median nerve distributions. Right upper extremity:  Skin: Intact circumferentially, no swelling, ecchymosis or edema is appreciated  Neuro: Sensation intact light touch in the radial, ulnar and median nerve distribution. Able to perform all cardinal hand movements. Vascular: Strong palpable radial pulse, brisk cap refill in all digits. MSK: Patient has a negative Tinel sign and negative Emily's compression test.  Patient has no thenar atrophy appreciated or intrinsic atrophy appreciated about the hand. Radiographs and Laboratory Studies:     Diagnostic Imaging Studies:    No imaging was obtained during this visit    Laboratory Studies:   Lab Results   Component Value Date    WBC 6.9 09/04/2020    HGB 13.2 09/04/2020    HCT 40.2 09/04/2020    MCV 90.1 09/04/2020     09/04/2020     Lab Results   Component Value Date    SEDRATE 14 06/17/2016     No results found for: CRP    Assessment:       Diagnosis Orders   1. Carpal tunnel syndrome of right wrist  Who cock-up nonmolde pre ots          Plan:     Patient likely has carpal tunnel syndrome of her right wrist as well as trigger fingers of the fourth and fifth digits of the left hand. Both hands appear to be experiencing mild symptoms and I am not able to reproduce any significant physical findings in office today. I did discuss with her treatment options available to this and should these worsen or recur patient can return to office for further interventions likely steroid injections at this time. Patient was informed regarding her carpal tunnel on her right hand that she should wear a wrist brace to go to sleep which should prevent flexion at the wrist and allow for decrease symptoms in this regard.     Orders Placed This Encounter   Procedures    Who cock-up nonmolde pre ots     Patient was prescribed a Breg Wrist Cock-Up Brace . The right wrist will require stabilization / immobilization from this semi-rigid / rigid orthosis to improve their function. The orthosis will assist in protecting the affected area, provide functional support and facilitate healing. The patient was educated and fit by a healthcare professional with expert knowledge and specialization in brace application while under the direct supervision of the treating physician. Verbal and written instructions for the use of and application of this item were provided. They were instructed to contact the office immediately should the brace result in increased pain, decreased sensation, increased swelling or worsening of the condition. No orders of the defined types were placed in this encounter. No follow-ups on file.       Carrington South MD

## 2020-10-20 ENCOUNTER — OFFICE VISIT (OUTPATIENT)
Dept: GASTROENTEROLOGY | Age: 75
End: 2020-10-20
Payer: MEDICARE

## 2020-10-20 VITALS
HEART RATE: 89 BPM | OXYGEN SATURATION: 97 % | HEIGHT: 65 IN | WEIGHT: 150 LBS | SYSTOLIC BLOOD PRESSURE: 136 MMHG | BODY MASS INDEX: 24.99 KG/M2 | DIASTOLIC BLOOD PRESSURE: 82 MMHG

## 2020-10-20 PROCEDURE — 1123F ACP DISCUSS/DSCN MKR DOCD: CPT | Performed by: SPECIALIST

## 2020-10-20 PROCEDURE — 1090F PRES/ABSN URINE INCON ASSESS: CPT | Performed by: SPECIALIST

## 2020-10-20 PROCEDURE — 99212 OFFICE O/P EST SF 10 MIN: CPT | Performed by: SPECIALIST

## 2020-10-20 PROCEDURE — G8420 CALC BMI NORM PARAMETERS: HCPCS | Performed by: SPECIALIST

## 2020-10-20 PROCEDURE — 1036F TOBACCO NON-USER: CPT | Performed by: SPECIALIST

## 2020-10-20 PROCEDURE — G8427 DOCREV CUR MEDS BY ELIG CLIN: HCPCS | Performed by: SPECIALIST

## 2020-10-20 PROCEDURE — G8400 PT W/DXA NO RESULTS DOC: HCPCS | Performed by: SPECIALIST

## 2020-10-20 PROCEDURE — G8484 FLU IMMUNIZE NO ADMIN: HCPCS | Performed by: SPECIALIST

## 2020-10-20 PROCEDURE — 4040F PNEUMOC VAC/ADMIN/RCVD: CPT | Performed by: SPECIALIST

## 2020-10-20 ASSESSMENT — ENCOUNTER SYMPTOMS
VOMITING: 0
ABDOMINAL PAIN: 0
RESPIRATORY NEGATIVE: 1
RECTAL PAIN: 0
NAUSEA: 0
DIARRHEA: 1
EYES NEGATIVE: 1
BLOOD IN STOOL: 0
ANAL BLEEDING: 0
CONSTIPATION: 0
ABDOMINAL DISTENTION: 0

## 2020-12-18 ENCOUNTER — TELEPHONE (OUTPATIENT)
Dept: PULMONOLOGY | Age: 75
End: 2020-12-18

## 2020-12-18 NOTE — TELEPHONE ENCOUNTER
Pts in lab study was denied by insurance but they are willing to cover a home study. Would you be able to place an order it? Tim Taylor

## 2021-01-04 ENCOUNTER — OFFICE VISIT (OUTPATIENT)
Dept: CARDIOLOGY CLINIC | Age: 76
End: 2021-01-04
Payer: MEDICARE

## 2021-01-04 VITALS
HEIGHT: 65 IN | BODY MASS INDEX: 24.32 KG/M2 | HEART RATE: 83 BPM | WEIGHT: 146 LBS | RESPIRATION RATE: 16 BRPM | SYSTOLIC BLOOD PRESSURE: 138 MMHG | OXYGEN SATURATION: 98 % | DIASTOLIC BLOOD PRESSURE: 86 MMHG

## 2021-01-04 DIAGNOSIS — R09.89 BILATERAL CAROTID BRUITS: ICD-10-CM

## 2021-01-04 DIAGNOSIS — I10 ESSENTIAL HYPERTENSION: Primary | ICD-10-CM

## 2021-01-04 DIAGNOSIS — E78.5 DYSLIPIDEMIA: ICD-10-CM

## 2021-01-04 PROCEDURE — G8427 DOCREV CUR MEDS BY ELIG CLIN: HCPCS | Performed by: INTERNAL MEDICINE

## 2021-01-04 PROCEDURE — 93000 ELECTROCARDIOGRAM COMPLETE: CPT | Performed by: INTERNAL MEDICINE

## 2021-01-04 PROCEDURE — G8484 FLU IMMUNIZE NO ADMIN: HCPCS | Performed by: INTERNAL MEDICINE

## 2021-01-04 PROCEDURE — G8400 PT W/DXA NO RESULTS DOC: HCPCS | Performed by: INTERNAL MEDICINE

## 2021-01-04 PROCEDURE — 4040F PNEUMOC VAC/ADMIN/RCVD: CPT | Performed by: INTERNAL MEDICINE

## 2021-01-04 PROCEDURE — 1123F ACP DISCUSS/DSCN MKR DOCD: CPT | Performed by: INTERNAL MEDICINE

## 2021-01-04 PROCEDURE — 1090F PRES/ABSN URINE INCON ASSESS: CPT | Performed by: INTERNAL MEDICINE

## 2021-01-04 PROCEDURE — G8420 CALC BMI NORM PARAMETERS: HCPCS | Performed by: INTERNAL MEDICINE

## 2021-01-04 PROCEDURE — 99214 OFFICE O/P EST MOD 30 MIN: CPT | Performed by: INTERNAL MEDICINE

## 2021-01-04 PROCEDURE — 1036F TOBACCO NON-USER: CPT | Performed by: INTERNAL MEDICINE

## 2021-01-04 ASSESSMENT — ENCOUNTER SYMPTOMS
GASTROINTESTINAL NEGATIVE: 1
SHORTNESS OF BREATH: 1
BLOOD IN STOOL: 0
NAUSEA: 0
CHEST TIGHTNESS: 0
COUGH: 0
EYES NEGATIVE: 1
WHEEZING: 0
STRIDOR: 0

## 2021-01-04 NOTE — PROGRESS NOTES
Subsequent Progress Note  Patient: Stanley Devlin  YOB: 1945  MRN: [id number]    Chief Complaint: CP BEATRICE HTN VAZQUEZ dizzy  Chief Complaint   Patient presents with    Follow-up     4 month    Hypertension    Hyperlipidemia   Prior Dr. Constantin Gli pt. 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 1/2020 spect negative. 1/2020 echo ef 60  1/2020 CUS mild     Subjective/HPI: recent in hosp for CP. No ACS and released. She still has CP sometimes related to eating other times not. She has signficant VAZQUEZ with walking. Getting worse. Has BEATRICE but not using CPAP as it leaks a lot. 1/30/2020 fell this past Saturday while picking up dog waste. No major injuries. She walked in here today. She has been dizzy lately. 9/2/2020 still dizzy but no further falls no bleed. Takes meds. stopped asa. . she has developed Dysphagia and choking with solids and liquids. 1/4/21 no cp no sob no falls no bleed no falls decreased appetite. Life  Long nonsmoker  No ETOH  Retired- .      EKG:    Past Medical History:   Diagnosis Date    Anticoagulant long-term use     Carotid artery stenosis     Family history of early CAD 6/28/2016    Fibromyalgia     GERD (gastroesophageal reflux disease)     Hypertension     Neuropathy     Sleep apnea     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 myelopathy or radiculopathy        Past Surgical History:   Procedure Laterality Date    BLADDER SURGERY      stimulator implant    BLADDER SUSPENSION      CHOLECYSTECTOMY      COLONOSCOPY  9/22/2020    COLONOSCOPY WITH BIOPSY AND POLYPECTOMY performed by Stiven Park MD at [address]      UPPER GASTROINTESTINAL ENDOSCOPY N/A 9/22/2020    EGD WITH POLYPECTOMY AND DILATION performed by Stiven Park MD at [facility]       Family History   Problem Relation Age of Onset    Crohn's Disease Sister     Colon Cancer Neg Hx        Social ([place]) POWD Start with 2 to 3 tsp daily with 8 oz of water, titrate based on stool consistency, titrate down based on bloating & abdominal cramps 1 Can 3    pantoprazole (PROTONIX) 40 MG tablet Take 1 tablet by mouth 2 times daily (before meals) 60 tablet 5    oxymetazoline (12 HOUR NASAL SPRAY) 0.05 % nasal spray 2 sprays by Nasal route 2 times daily 1 Bottle 3     No current facility-administered medications for this visit. Review of Systems:   Review of Systems   Constitutional: Positive for fatigue. Negative for diaphoresis. HENT: Negative. Eyes: Negative. Respiratory: Positive for shortness of breath. Negative for cough, chest tightness, wheezing and stridor. Cardiovascular: Positive for chest pain. Negative for palpitations and leg swelling. Gastrointestinal: Negative. Negative for blood in stool and nausea. Genitourinary: Negative. Musculoskeletal: Negative. Skin: Negative. Neurological: Positive for dizziness and light-headedness. Negative for syncope and weakness. Hematological: Negative. Psychiatric/Behavioral: Negative. Physical Examination:    /86 (Site: Left Upper Arm, Position: Sitting, Cuff Size: Medium Adult)   Pulse 83   Resp 16   Ht 5' 5\" (1.651 m)   Wt 146 lb (66.2 kg)   SpO2 98%   BMI 24.30 kg/m²    Physical Exam   Constitutional: She appears healthy. No distress. HENT:   Normal cephalic and Atraumatic   Eyes: Pupils are equal, round, and reactive to light. Neck: Normal range of motion and thyroid normal. Neck supple. No JVD present. No neck adenopathy. No thyromegaly present. Cardiovascular: Normal rate, regular rhythm, intact distal pulses and normal pulses. Murmur heard. Pulses:       Carotid pulses are on the right side with bruit and on the left side with bruit. Pulmonary/Chest: Effort normal and breath sounds normal. She has no wheezes. She has no rales. She exhibits no tenderness. Abdominal: Soft.  Bowel sounds are normal. There is no abdominal tenderness. Musculoskeletal: Normal range of motion. General: No tenderness or edema. Neurological: She is alert and oriented to person, place, and time. Skin: Skin is warm. No cyanosis. Nails show no clubbing.        LABS:  CBC:   Lab Results   Component Value Date    WBC 6.9 09/04/2020    RBC 4.46 09/04/2020    RBC 4.35 06/01/2012    HGB 13.2 09/04/2020    HCT 40.2 09/04/2020    MCV 90.1 09/04/2020    MCH 29.6 09/04/2020    MCHC 32.8 09/04/2020    RDW 14.2 09/04/2020     09/04/2020    MPV 9.0 09/03/2014     Lipids:  Lab Results   Component Value Date    CHOL 169 09/21/2019    CHOL 105 06/16/2016    CHOL 189 09/02/2014     Lab Results   Component Value Date    TRIG 132 06/16/2016    TRIG 188 09/02/2014    TRIG 192 04/16/2014     Lab Results   Component Value Date    HDL 40 06/16/2016    HDL 49 09/02/2014    HDL 33 (L) 04/16/2014     Lab Results   Component Value Date    LDLCALC 39 06/16/2016    LDLCALC 102 09/02/2014    LDLCALC 101 04/16/2014     No results found for: LABVLDL, VLDL  Lab Results   Component Value Date    CHOLHDLRATIO 4.3 05/31/2012    CHOLHDLRATIO 4.6 05/30/2012     CMP:    Lab Results   Component Value Date     09/04/2020    K 3.9 09/04/2020     09/04/2020    CO2 25 09/04/2020    BUN 11 09/04/2020    CREATININE 0.65 09/04/2020    GFRAA >60.0 09/04/2020    LABGLOM >60.0 09/04/2020    GLUCOSE 79 09/04/2020    GLUCOSE 139 06/01/2012    PROT 6.5 09/04/2020    LABALBU 4.0 09/04/2020    LABALBU 4.1 05/31/2012    CALCIUM 9.3 09/04/2020    BILITOT 0.3 09/04/2020    ALKPHOS 91 09/04/2020    AST 20 09/04/2020    ALT 15 09/04/2020     BMP:    Lab Results   Component Value Date     09/04/2020    K 3.9 09/04/2020     09/04/2020    CO2 25 09/04/2020    BUN 11 09/04/2020    LABALBU 4.0 09/04/2020    LABALBU 4.1 05/31/2012    CREATININE 0.65 09/04/2020    CALCIUM 9.3 09/04/2020    GFRAA >60.0 09/04/2020    LABGLOM >60.0 09/04/2020    GLUCOSE 79 09/04/2020    GLUCOSE 139 06/01/2012     Magnesium:    Lab Results   Component Value Date    MG 2.2 12/26/2019    MG 2.1 05/30/2012     TSH:  Lab Results   Component Value Date    TSH 1.750 09/02/2014       Patient Active Problem List   Diagnosis    Dysphagia    Arterial fibromuscular dysplasia (HCC)    Fibromyalgia    Gastroesophageal reflux disease    Obstructive sleep apnea syndrome    Peripheral neuropathy (HCC)    Varicose veins of lower extremity    Chest pain    BEATRICE (obstructive sleep apnea)    Stenosis of right carotid artery    Dyslipidemia    BEATRICE on CPAP    Essential hypertension    Dizzy    Gastric polyp    Esophageal stricture       Medications Discontinued During This Encounter   Medication Reason    Na Sulfate-K Sulfate-Mg Sulf 17.5-3.13-1.6 GM/177ML SOLN LIST CLEANUP    cholestyramine (QUESTRAN) 4 g packet Patient Choice       Modified Medications    No medications on file       No orders of the defined types were placed in this encounter. Assessment/Plan:    1. Essential hypertension   stable   And we will dc Low dose Losartan 25 given Postional dizizness. - stable     2. Chest pain, unspecified type     3. BEATRICE (obstructive sleep apnea)  Will need Pul referral   Not using CPAP. 4. Stenosis of right carotid artery - resume ASA w Food. 5. Dyslipidemia  Add Atorvastatin     6. GERD- try Protonix 40 bid - much improved. 7. Dysphagia - s/p Esophageal dilation. 8. Car brutis- CUS  Counseling:  Heart Healthy Lifestyle, Low Salt Diet, Take Precautions to Prevent Falls and Walk Daily    Return in about 4 months (around 5/4/2021).       Electronically signed by Kayla Garcia MD on 1/4/2021 at 1:38 PM

## 2021-01-18 ENCOUNTER — VIRTUAL VISIT (OUTPATIENT)
Dept: GASTROENTEROLOGY | Age: 76
End: 2021-01-18
Payer: MEDICARE

## 2021-01-18 DIAGNOSIS — R15.9 FULL INCONTINENCE OF FECES: Primary | ICD-10-CM

## 2021-01-18 PROCEDURE — 99442 PR PHYS/QHP TELEPHONE EVALUATION 11-20 MIN: CPT | Performed by: SPECIALIST

## 2021-01-18 ASSESSMENT — ENCOUNTER SYMPTOMS
EYES NEGATIVE: 1
RECTAL PAIN: 0
BLOOD IN STOOL: 0
ANAL BLEEDING: 0
CONSTIPATION: 0
GASTROINTESTINAL NEGATIVE: 1
DIARRHEA: 0
RESPIRATORY NEGATIVE: 1
NAUSEA: 0
VOMITING: 0
ABDOMINAL PAIN: 0
ABDOMINAL DISTENTION: 0

## 2021-01-18 NOTE — PROGRESS NOTES
Gastroenterology Clinic Follow up Visit    Rosa Silverman  [id number]  Chief Complaint   Patient presents with    Follow-up     Patient states she is experiencing fecal urgency, cannot feel when she makes BM. Does see mucus in stools occasionally. HPI and A/P at last visit summarized below: This was a telephone encounter. Patient was at home and I was in our office and she was told it is a billable service and she had agreed for that. This was a patient initiated telephone encounter. Dysphagia has improved significantly but still has to drink water at times to help her swallow. Patient reports having fecal incontinence now but the stool is formed, she has a history of urinary incontinence and has a later placed which is malfunctioning and she is scheduled to see urologist for replacement of that. No history of any bleeding, no nausea or vomiting. No history of any weight loss, duration of phone call was 15 minutes    Review of Systems   Constitutional: Negative. HENT: Negative. Eyes: Negative. Respiratory: Negative. Cardiovascular: Positive for chest pain. Gastrointestinal: Negative. Negative for abdominal distention, abdominal pain, anal bleeding, blood in stool, constipation, diarrhea, nausea, rectal pain and vomiting. Occasional fecal incontinence   Endocrine: Negative. Genitourinary: Negative. Musculoskeletal: Negative. Skin: Negative. Allergic/Immunologic: Negative for food allergies. Neurological: Negative. Hematological: Negative. Psychiatric/Behavioral: Negative. Past medical history, past surgical history, medication list, social and familyhistory reviewed    There were no vitals taken for this visit. Physical Exam  Constitutional:       Appearance: She is well-developed. HENT:      Head: Normocephalic and atraumatic. Eyes:      Conjunctiva/sclera: Conjunctivae normal.      Pupils: Pupils are equal, round, and reactive to light. Neck:      Musculoskeletal: Normal range of motion. Cardiovascular:      Rate and Rhythm: Normal rate. Pulmonary:      Effort: Pulmonary effort is normal.   Abdominal:      General: Bowel sounds are normal.      Palpations: Abdomen is soft. Musculoskeletal: Normal range of motion. Skin:     General: Skin is warm. Neurological:      Mental Status: She is alert. Laboratory, Pathology, Radiology reviewed in detail with relevantimportant investigations summarized below:    No results for input(s): WBC, HGB, HCT, MCV, PLT in the last 720 hours. Lab Results   Component Value Date    ALT 15 09/04/2020    AST 20 09/04/2020    ALKPHOS 91 09/04/2020    BILITOT 0.3 09/04/2020     No results found. Endoscopic investigations:     Assessment and Plan:  Zaida Brandt 66 y.o. female for follow up. History of occasional fecal incontinence and history of urinary incontinence, scheduled to see her urologist for evaluation of the stimulator malfunction, advised to try Imodium for fecal incontinence, patient is on Protonix and advised to continue that.,  Follow-up after 3 months   Diagnosis Orders   1. Full incontinence of feces         Return in about 3 months (around 4/18/2021). Sarai Silva MD   StaffGastroenterologist  [facility]    Please note this report has been partially produced using speech recognitionsoftware  and may cause contain errors related to that system including grammar, punctuation and spelling as well as words andphrases that may seem inappropriate. If there are questions or concerns please feel free to contact me to clarify.

## 2021-02-03 ENCOUNTER — HOSPITAL ENCOUNTER (OUTPATIENT)
Dept: ULTRASOUND IMAGING | Age: 76
Discharge: HOME | End: 2021-02-05
Payer: MEDICARE

## 2021-02-03 DIAGNOSIS — R09.89 BILATERAL CAROTID BRUITS: ICD-10-CM

## 2021-02-03 PROCEDURE — 93880 EXTRACRANIAL BILAT STUDY: CPT

## 2021-02-03 PROCEDURE — 93880 EXTRACRANIAL BILAT STUDY: CPT | Performed by: INTERNAL MEDICINE

## 2021-02-08 ENCOUNTER — TELEPHONE (OUTPATIENT)
Dept: FAMILY MEDICINE CLINIC | Age: 76
End: 2021-02-08

## 2021-02-08 NOTE — TELEPHONE ENCOUNTER
YANY requesting CB to schedule an apt.   Her follow up apt was due in December 2020 so she is past due tor a F/U apt

## 2021-02-23 ENCOUNTER — OFFICE VISIT (OUTPATIENT)
Dept: FAMILY MEDICINE CLINIC | Age: 76
End: 2021-02-23
Payer: MEDICARE

## 2021-02-23 VITALS
WEIGHT: 150 LBS | SYSTOLIC BLOOD PRESSURE: 128 MMHG | HEIGHT: 65 IN | HEART RATE: 79 BPM | BODY MASS INDEX: 24.99 KG/M2 | RESPIRATION RATE: 14 BRPM | OXYGEN SATURATION: 97 % | TEMPERATURE: 97 F | DIASTOLIC BLOOD PRESSURE: 80 MMHG

## 2021-02-23 DIAGNOSIS — Z13.31 POSITIVE DEPRESSION SCREENING: ICD-10-CM

## 2021-02-23 DIAGNOSIS — F32.A DEPRESSION, UNSPECIFIED DEPRESSION TYPE: Primary | ICD-10-CM

## 2021-02-23 DIAGNOSIS — Z78.0 POST-MENOPAUSAL: ICD-10-CM

## 2021-02-23 PROCEDURE — 1123F ACP DISCUSS/DSCN MKR DOCD: CPT | Performed by: INTERNAL MEDICINE

## 2021-02-23 PROCEDURE — G8420 CALC BMI NORM PARAMETERS: HCPCS | Performed by: INTERNAL MEDICINE

## 2021-02-23 PROCEDURE — 1090F PRES/ABSN URINE INCON ASSESS: CPT | Performed by: INTERNAL MEDICINE

## 2021-02-23 PROCEDURE — G8427 DOCREV CUR MEDS BY ELIG CLIN: HCPCS | Performed by: INTERNAL MEDICINE

## 2021-02-23 PROCEDURE — 4040F PNEUMOC VAC/ADMIN/RCVD: CPT | Performed by: INTERNAL MEDICINE

## 2021-02-23 PROCEDURE — G8400 PT W/DXA NO RESULTS DOC: HCPCS | Performed by: INTERNAL MEDICINE

## 2021-02-23 PROCEDURE — G8484 FLU IMMUNIZE NO ADMIN: HCPCS | Performed by: INTERNAL MEDICINE

## 2021-02-23 PROCEDURE — 1036F TOBACCO NON-USER: CPT | Performed by: INTERNAL MEDICINE

## 2021-02-23 PROCEDURE — 99213 OFFICE O/P EST LOW 20 MIN: CPT | Performed by: INTERNAL MEDICINE

## 2021-02-23 PROCEDURE — G8431 POS CLIN DEPRES SCRN F/U DOC: HCPCS | Performed by: INTERNAL MEDICINE

## 2021-02-23 RX ORDER — CITALOPRAM 20 MG/1
20 TABLET ORAL DAILY
Qty: 16 TABLET | Refills: 0 | Status: SHIPPED | OUTPATIENT
Start: 2021-02-23 | End: 2021-10-29

## 2021-02-23 SDOH — ECONOMIC STABILITY: FOOD INSECURITY: WITHIN THE PAST 12 MONTHS, YOU WORRIED THAT YOUR FOOD WOULD RUN OUT BEFORE YOU GOT MONEY TO BUY MORE.: NEVER TRUE

## 2021-02-23 SDOH — ECONOMIC STABILITY: INCOME INSECURITY: HOW HARD IS IT FOR YOU TO PAY FOR THE VERY BASICS LIKE FOOD, HOUSING, MEDICAL CARE, AND HEATING?: NOT HARD AT ALL

## 2021-02-23 ASSESSMENT — ENCOUNTER SYMPTOMS
DIARRHEA: 0
BLOOD IN STOOL: 0
ABDOMINAL PAIN: 0
WHEEZING: 0
NAUSEA: 0
RHINORRHEA: 0
BACK PAIN: 0
EYE DISCHARGE: 0
VOMITING: 0
CONSTIPATION: 0
EYE PAIN: 0
COUGH: 0
COLOR CHANGE: 0
EYE ITCHING: 0
PHOTOPHOBIA: 0
SINUS PRESSURE: 0
VOICE CHANGE: 0
RECTAL PAIN: 0
SINUS PAIN: 0
APNEA: 0
CHEST TIGHTNESS: 0
SHORTNESS OF BREATH: 0
SORE THROAT: 0
TROUBLE SWALLOWING: 0
EYE REDNESS: 0
ABDOMINAL DISTENTION: 0
FACIAL SWELLING: 0

## 2021-02-23 ASSESSMENT — PATIENT HEALTH QUESTIONNAIRE - PHQ9
SUM OF ALL RESPONSES TO PHQ QUESTIONS 1-9: 21
SUM OF ALL RESPONSES TO PHQ9 QUESTIONS 1 & 2: 6
8. MOVING OR SPEAKING SO SLOWLY THAT OTHER PEOPLE COULD HAVE NOTICED. OR THE OPPOSITE, BEING SO FIGETY OR RESTLESS THAT YOU HAVE BEEN MOVING AROUND A LOT MORE THAN USUAL: 0
5. POOR APPETITE OR OVEREATING: 3
1. LITTLE INTEREST OR PLEASURE IN DOING THINGS: 3
9. THOUGHTS THAT YOU WOULD BE BETTER OFF DEAD, OR OF HURTING YOURSELF: 0
10. IF YOU CHECKED OFF ANY PROBLEMS, HOW DIFFICULT HAVE THESE PROBLEMS MADE IT FOR YOU TO DO YOUR WORK, TAKE CARE OF THINGS AT HOME, OR GET ALONG WITH OTHER PEOPLE: 3
2. FEELING DOWN, DEPRESSED OR HOPELESS: 3

## 2021-02-23 ASSESSMENT — COLUMBIA-SUICIDE SEVERITY RATING SCALE - C-SSRS: 1. WITHIN THE PAST MONTH, HAVE YOU WISHED YOU WERE DEAD OR WISHED YOU COULD GO TO SLEEP AND NOT WAKE UP?: NO

## 2021-02-23 NOTE — PROGRESS NOTES
Subjective     Xin Nye 66 y.o. female presents 2/23/21 with   No chief complaint on file. 47-year-old female sees with a history of bladder stimulator placed at [facility], has appt 5-6 years ago. Presents with a complaint of bilateral hand pain. She was previously referred to orthopedic surgery for this complaint. Depression : The patient states that her  does not treat her well. This is resulting in her having a depressed mood. Bilateral hand pain: The patient previously past she is concerned as to whether this represents rheumatoid arthritis. Fatigue: The patient states that she has been experiencing persistent fatigue for several months.     Reviewed the following history:    Past Medical History:   Diagnosis Date    Anticoagulant long-term use     Carotid artery stenosis     Family history of early CAD 6/28/2016    Fibromyalgia     GERD (gastroesophageal reflux disease)     Hypertension     Neuropathy     Sleep apnea     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 myelopathy or radiculopathy      Past Surgical History:   Procedure Laterality Date    BLADDER SURGERY      stimulator implant    BLADDER SUSPENSION      CHOLECYSTECTOMY      COLONOSCOPY  9/22/2020    COLONOSCOPY WITH BIOPSY AND POLYPECTOMY performed by Geoffrey Best MD at [address]      UPPER GASTROINTESTINAL ENDOSCOPY N/A 9/22/2020    EGD WITH POLYPECTOMY AND DILATION performed by Geoffrey Best MD at [facility]     Family History   Problem Relation Age of Onset    Crohn's Disease Sister     Colon Cancer Neg Hx        Allergies   Allergen Reactions    Latex     Amoxicillin-Pot Clavulanate      Other reaction(s): Vomiting    Carbidopa-Levodopa      Other reaction(s): Unknown  Effects the eyes    Iv [Iodides]     Morphine Other (See Comments)     Made her go 'berserk '    Nitrofurantoin Other (See Comments)     Myalgia    Plavix [Clopidogrel Bisulfate]     Pravastatin Other (See Comments)     Myalgia       Current Outpatient Medications on File Prior to Visit   Medication Sig Dispense Refill    prednisoLONE acetate (PRED FORTE) 1 % ophthalmic suspension INSTILL 1 DROP INTO RIGHT EYE 6 TIMES A DAY FOR 2 DAYS THEN DECREASE TO 4 TIMES A DAY      primidone (MYSOLINE) 50 MG tablet Take 0.5 tablets by mouth nightly 16 tablet 3    aspirin EC 81 MG EC tablet Take 1 tablet by mouth daily 90 tablet 3    Wheat Dextrin (BENEFIBER) POWD Start with 2 to 3 tsp daily with 8 oz of water, titrate based on stool consistency, titrate down based on bloating & abdominal cramps 1 Can 3    pantoprazole (PROTONIX) 40 MG tablet Take 1 tablet by mouth 2 times daily (before meals) 60 tablet 5     No current facility-administered medications on file prior to visit. Review of Systems   Constitutional: Negative for chills, diaphoresis, fatigue and fever. HENT: Negative for congestion, dental problem, drooling, ear discharge, ear pain, facial swelling, hearing loss, mouth sores, nosebleeds, postnasal drip, rhinorrhea, sinus pressure, sinus pain, sneezing, sore throat, tinnitus, trouble swallowing and voice change. Eyes: Negative for photophobia, pain, discharge, redness, itching and visual disturbance. Respiratory: Negative for apnea, cough, chest tightness, shortness of breath and wheezing. Cardiovascular: Negative for chest pain, palpitations and leg swelling. Gastrointestinal: Negative for abdominal distention, abdominal pain, blood in stool, constipation, diarrhea, nausea, rectal pain and vomiting. Endocrine: Negative for cold intolerance, heat intolerance, polydipsia, polyphagia and polyuria. Genitourinary: Negative for decreased urine volume, difficulty urinating, dysuria, flank pain, frequency, genital sores, hematuria, pelvic pain, urgency (pressure), vaginal bleeding, vaginal discharge and vaginal pain.    Musculoskeletal: Negative for arthralgias, back pain, gait problem, joint swelling, myalgias, neck pain and neck stiffness. Skin: Negative for color change, rash and wound. Allergic/Immunologic: Negative for environmental allergies and food allergies. Neurological: Negative for dizziness, tremors, seizures, syncope, facial asymmetry, speech difficulty, weakness, light-headedness, numbness and headaches. Hematological: Negative for adenopathy. Does not bruise/bleed easily. Psychiatric/Behavioral: Negative for agitation, confusion, decreased concentration, hallucinations, self-injury, sleep disturbance and suicidal ideas. The patient is not nervous/anxious. Objective    Vitals:    02/23/21 1129   BP: 128/80   Pulse: 79   Resp: 14   Temp: 97 °F (36.1 °C)   SpO2: 97%   Weight: 150 lb (68 kg)   Height: 5' 5\" (1.651 m)       Physical Exam  Vitals signs reviewed. Constitutional:       General: She is not in acute distress. Appearance: She is well-developed. She is not ill-appearing or toxic-appearing. HENT:      Head: Normocephalic and atraumatic. Right Ear: Hearing and external ear normal.      Left Ear: Hearing and external ear normal.   Cardiovascular:      Rate and Rhythm: Normal rate and regular rhythm. Pulmonary:      Effort: Pulmonary effort is normal. No respiratory distress. Breath sounds: Normal breath sounds. Abdominal:      General: Abdomen is flat. Bowel sounds are normal.      Palpations: Abdomen is soft. Tenderness: There is no abdominal tenderness. There is no right CVA tenderness or left CVA tenderness. Skin:     General: Skin is warm and dry. Neurological:      General: No focal deficit present. Mental Status: She is alert and oriented to person, place, and time. POC Testing Today:   No results found for this visit on 02/23/21.     Assessment and Plan    Diagnoses and all orders for this visit:    Depression, unspecified depression type    Post-menopausal    Positive depression screening  -     Positive Screen for Clinical Depression with a Documented Follow-up Plan   -     Ambulatory referral to Psychology  -     [address]    Other orders  -     citalopram (CELEXA) 20 MG tablet; Take 1 tablet by mouth daily    The patient is scheduled to undergo an endoscopy tomorrow. No follow-ups on file.       Jennifer Wagner MD

## 2021-03-08 NOTE — TELEPHONE ENCOUNTER
Jet Stone received your letter. She would like to talk with you about that. Can you please call her at your convenience [phone].

## 2021-03-29 ENCOUNTER — OFFICE VISIT (OUTPATIENT)
Dept: FAMILY MEDICINE CLINIC | Age: 76
End: 2021-03-29
Payer: MEDICARE

## 2021-03-29 VITALS
HEART RATE: 81 BPM | TEMPERATURE: 97 F | RESPIRATION RATE: 14 BRPM | BODY MASS INDEX: 25.33 KG/M2 | WEIGHT: 152 LBS | HEIGHT: 65 IN | DIASTOLIC BLOOD PRESSURE: 80 MMHG | OXYGEN SATURATION: 97 % | SYSTOLIC BLOOD PRESSURE: 136 MMHG

## 2021-03-29 DIAGNOSIS — Z78.0 POST-MENOPAUSAL: Primary | ICD-10-CM

## 2021-03-29 DIAGNOSIS — F32.A DEPRESSION, UNSPECIFIED DEPRESSION TYPE: ICD-10-CM

## 2021-03-29 DIAGNOSIS — Z13.820 OSTEOPOROSIS SCREENING: ICD-10-CM

## 2021-03-29 PROCEDURE — 1090F PRES/ABSN URINE INCON ASSESS: CPT | Performed by: INTERNAL MEDICINE

## 2021-03-29 PROCEDURE — G8427 DOCREV CUR MEDS BY ELIG CLIN: HCPCS | Performed by: INTERNAL MEDICINE

## 2021-03-29 PROCEDURE — 1036F TOBACCO NON-USER: CPT | Performed by: INTERNAL MEDICINE

## 2021-03-29 PROCEDURE — 1123F ACP DISCUSS/DSCN MKR DOCD: CPT | Performed by: INTERNAL MEDICINE

## 2021-03-29 PROCEDURE — G8400 PT W/DXA NO RESULTS DOC: HCPCS | Performed by: INTERNAL MEDICINE

## 2021-03-29 PROCEDURE — G8417 CALC BMI ABV UP PARAM F/U: HCPCS | Performed by: INTERNAL MEDICINE

## 2021-03-29 PROCEDURE — G8484 FLU IMMUNIZE NO ADMIN: HCPCS | Performed by: INTERNAL MEDICINE

## 2021-03-29 PROCEDURE — 4040F PNEUMOC VAC/ADMIN/RCVD: CPT | Performed by: INTERNAL MEDICINE

## 2021-03-29 PROCEDURE — 99213 OFFICE O/P EST LOW 20 MIN: CPT | Performed by: INTERNAL MEDICINE

## 2021-03-29 RX ORDER — FLUTICASONE PROPIONATE 50 MCG
1 SPRAY, SUSPENSION (ML) NASAL 2 TIMES DAILY
Qty: 1 BOTTLE | Refills: 3 | Status: ON HOLD | OUTPATIENT
Start: 2021-03-29 | End: 2022-01-27

## 2021-03-29 RX ORDER — PANTOPRAZOLE SODIUM 40 MG/1
40 TABLET, DELAYED RELEASE ORAL
Qty: 60 TABLET | Refills: 5 | Status: SHIPPED | OUTPATIENT
Start: 2021-03-29 | End: 2022-02-10

## 2021-03-29 ASSESSMENT — ENCOUNTER SYMPTOMS
EYE DISCHARGE: 0
NAUSEA: 0
EYE PAIN: 0
RECTAL PAIN: 0
SORE THROAT: 0
RHINORRHEA: 0
SINUS PRESSURE: 0
BLOOD IN STOOL: 0
PHOTOPHOBIA: 0
COUGH: 0
SHORTNESS OF BREATH: 0
VOICE CHANGE: 0
SINUS PAIN: 0
EYE ITCHING: 0
WHEEZING: 0
APNEA: 0
VOMITING: 0
FACIAL SWELLING: 0
CHEST TIGHTNESS: 0
ABDOMINAL DISTENTION: 0
BACK PAIN: 0
DIARRHEA: 0
EYE REDNESS: 0
COLOR CHANGE: 0
ABDOMINAL PAIN: 0
TROUBLE SWALLOWING: 0
CONSTIPATION: 0

## 2021-03-29 NOTE — PROGRESS NOTES
Subjective     Neli Chapman 66 y.o. female presents 3/29/21 with   Chief Complaint   Patient presents with    Depression     3 week        70-year-old female sees with a history of bladder stimulator placed at [facility], has appt 5-6 years ago. Presents with a complaint of bilateral hand pain. She was previously referred to orthopedic surgery for this complaint. Depression : The patient states that her  does not treat her well. This is resulting in her having a depressed mood. Bilateral hand pain: The patient previously past she is concerned as to whether this represents rheumatoid arthritis. Fatigue: The patient states that she has been experiencing persistent fatigue for several months. Essential tremors:compliant with primidone However her tremors have worsened.       GERD symptoms have worsened:off  protonix for 2 weeks.    ]  Reviewed the following history:    Past Medical History:   Diagnosis Date    Anticoagulant long-term use     Carotid artery stenosis     Family history of early CAD 6/28/2016    Fibromyalgia     GERD (gastroesophageal reflux disease)     Hypertension     Neuropathy     Sleep apnea     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 myelopathy or radiculopathy      Past Surgical History:   Procedure Laterality Date    BLADDER SURGERY      stimulator implant    BLADDER SUSPENSION      CHOLECYSTECTOMY      COLONOSCOPY  9/22/2020    COLONOSCOPY WITH BIOPSY AND POLYPECTOMY performed by Abdulkadir Lopez MD at [address] [place] [address] ENDOSCOPY N/A 9/22/2020    EGD WITH POLYPECTOMY AND DILATION performed by Abdulkadir Lopez MD at [facility]     Family History   Problem Relation Age of Onset    Crohn's Disease Sister     Colon Cancer Neg Hx        Allergies   Allergen Reactions    Latex     Amoxicillin-Pot Clavulanate      Other reaction(s): Vomiting    Carbidopa-Levodopa      Other frequency, genital sores, hematuria, pelvic pain, urgency (pressure), vaginal bleeding, vaginal discharge and vaginal pain. Musculoskeletal: Negative for arthralgias, back pain, gait problem, joint swelling, myalgias, neck pain and neck stiffness. Skin: Negative for color change, rash and wound. Allergic/Immunologic: Negative for environmental allergies and food allergies. Neurological: Negative for dizziness, tremors, seizures, syncope, facial asymmetry, speech difficulty, weakness, light-headedness, numbness and headaches. Hematological: Negative for adenopathy. Does not bruise/bleed easily. Psychiatric/Behavioral: Negative for agitation, confusion, decreased concentration, hallucinations, self-injury, sleep disturbance and suicidal ideas. The patient is not nervous/anxious. Objective    Vitals:    03/29/21 1140   BP: 136/80   Pulse: 81   Resp: 14   Temp: 97 °F (36.1 °C)   SpO2: 97%   Weight: 152 lb (68.9 kg)   Height: 5' 5\" (1.651 m)       Physical Exam  Vitals signs reviewed. Constitutional:       General: She is not in acute distress. Appearance: She is well-developed. She is not ill-appearing or toxic-appearing. HENT:      Head: Normocephalic and atraumatic. Right Ear: Hearing and external ear normal.      Left Ear: Hearing and external ear normal.   Cardiovascular:      Rate and Rhythm: Normal rate and regular rhythm. Pulmonary:      Effort: Pulmonary effort is normal. No respiratory distress. Breath sounds: Normal breath sounds. Abdominal:      General: Abdomen is flat. Bowel sounds are normal.      Palpations: Abdomen is soft. Tenderness: There is no abdominal tenderness. There is no right CVA tenderness or left CVA tenderness. Skin:     General: Skin is warm and dry. Neurological:      General: No focal deficit present. Mental Status: She is alert and oriented to person, place, and time.        POC Testing Today:   No results found for this visit on 03/29/21. Assessment and Plan    Diagnoses and all orders for this visit:    Depression, unspecified depression type    Post-menopausal-order dexa scan      Essential tremor: increase primodone to 50 mg daily         GERD:REFILLED PROTONIX      Positive depression screening    - Coordinate appt with  Vanessa Koehler MD, [place] and psychology    -  Discontinue  citalopram (CELEXA) 20 MG tablet; Take 1 tablet by mouth daily    The patient is scheduled to undergo an endoscopy tomorrow. Return in about 3 months (around 6/29/2021).       Emili Carroll MD

## 2021-04-12 ENCOUNTER — HOSPITAL ENCOUNTER (OUTPATIENT)
Dept: WOMENS IMAGING | Age: 76
Discharge: HOME | End: 2021-04-14
Payer: MEDICARE

## 2021-04-12 ENCOUNTER — TELEPHONE (OUTPATIENT)
Dept: FAMILY MEDICINE CLINIC | Age: 76
End: 2021-04-12

## 2021-04-12 DIAGNOSIS — Z78.0 POST-MENOPAUSAL: ICD-10-CM

## 2021-04-12 DIAGNOSIS — Z78.0 POST-MENOPAUSAL: Primary | ICD-10-CM

## 2021-04-12 PROCEDURE — 77080 DXA BONE DENSITY AXIAL: CPT

## 2021-05-04 ENCOUNTER — OFFICE VISIT (OUTPATIENT)
Dept: CARDIOLOGY CLINIC | Age: 76
End: 2021-05-04
Payer: MEDICARE

## 2021-05-04 VITALS
OXYGEN SATURATION: 97 % | SYSTOLIC BLOOD PRESSURE: 126 MMHG | RESPIRATION RATE: 16 BRPM | HEART RATE: 81 BPM | BODY MASS INDEX: 25.66 KG/M2 | WEIGHT: 154 LBS | DIASTOLIC BLOOD PRESSURE: 78 MMHG | HEIGHT: 65 IN

## 2021-05-04 DIAGNOSIS — R09.89 BILATERAL CAROTID BRUITS: ICD-10-CM

## 2021-05-04 DIAGNOSIS — E78.5 DYSLIPIDEMIA: ICD-10-CM

## 2021-05-04 DIAGNOSIS — I10 ESSENTIAL HYPERTENSION: Primary | ICD-10-CM

## 2021-05-04 PROCEDURE — 1036F TOBACCO NON-USER: CPT | Performed by: INTERNAL MEDICINE

## 2021-05-04 PROCEDURE — 4040F PNEUMOC VAC/ADMIN/RCVD: CPT | Performed by: INTERNAL MEDICINE

## 2021-05-04 PROCEDURE — G8417 CALC BMI ABV UP PARAM F/U: HCPCS | Performed by: INTERNAL MEDICINE

## 2021-05-04 PROCEDURE — 99214 OFFICE O/P EST MOD 30 MIN: CPT | Performed by: INTERNAL MEDICINE

## 2021-05-04 PROCEDURE — G8399 PT W/DXA RESULTS DOCUMENT: HCPCS | Performed by: INTERNAL MEDICINE

## 2021-05-04 PROCEDURE — 1123F ACP DISCUSS/DSCN MKR DOCD: CPT | Performed by: INTERNAL MEDICINE

## 2021-05-04 PROCEDURE — 1090F PRES/ABSN URINE INCON ASSESS: CPT | Performed by: INTERNAL MEDICINE

## 2021-05-04 PROCEDURE — G8427 DOCREV CUR MEDS BY ELIG CLIN: HCPCS | Performed by: INTERNAL MEDICINE

## 2021-05-04 RX ORDER — ATORVASTATIN CALCIUM 20 MG/1
20 TABLET, FILM COATED ORAL DAILY
Qty: 90 TABLET | Refills: 3 | Status: ON HOLD | OUTPATIENT
Start: 2021-05-04 | End: 2022-01-27

## 2021-05-04 ASSESSMENT — ENCOUNTER SYMPTOMS
EYES NEGATIVE: 1
WHEEZING: 0
CHEST TIGHTNESS: 0
SHORTNESS OF BREATH: 1
COUGH: 0
NAUSEA: 0
GASTROINTESTINAL NEGATIVE: 1
STRIDOR: 0
BLOOD IN STOOL: 0

## 2021-05-04 NOTE — PROGRESS NOTES
Subsequent Progress Note  Patient: Kei Topete  YOB: 1945  MRN: [id number]    Chief Complaint: CP BEATRICE HTN VAZQUEZ dizzy  Chief Complaint   Patient presents with    Follow-up     4 month    Hypertension    Hyperlipidemia   Prior Dr. Nnamdi Crum pt. 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 1/2020 spect negative. 1/2020 echo ef 60  1/2020 CUS mild   2/21CUS mild     Subjective/HPI: recent in hosp for CP. No ACS and released. She still has CP sometimes related to eating other times not. She has signficant VAZQUEZ with walking. Getting worse. Has BEATRICE but not using CPAP as it leaks a lot. 1/30/2020 fell this past Saturday while picking up dog waste. No major injuries. She walked in here today. She has been dizzy lately. 9/2/2020 still dizzy but no further falls no bleed. Takes meds. stopped asa. . she has developed Dysphagia and choking with solids and liquids. 1/4/21 no cp no sob no falls no bleed no falls decreased appetite. Life  Long nonsmoker    5/4/21 no cp no sob no falls no bleed. Had FlowPlaye simulator for incontinence but not effective      No ETOH  Retired- .      EKG:    Past Medical History:   Diagnosis Date    Anticoagulant long-term use     Carotid artery stenosis     Family history of early CAD 6/28/2016    Fibromyalgia     GERD (gastroesophageal reflux disease)     Hypertension     Neuropathy     Sleep apnea     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 myelopathy or radiculopathy        Past Surgical History:   Procedure Laterality Date    BLADDER SURGERY      stimulator implant    BLADDER SUSPENSION      CHOLECYSTECTOMY      COLONOSCOPY  9/22/2020    COLONOSCOPY WITH BIOPSY AND POLYPECTOMY performed by Virginia Tejada MD at [address] [place] [address] ENDOSCOPY N/A 9/22/2020    EGD WITH POLYPECTOMY AND DILATION performed by Virginia Tejada MD at [facility] Family History   Problem Relation Age of Onset    Crohn's Disease Sister     Colon Cancer Neg Hx        Social History     Socioeconomic History    Marital status:      Spouse name: None    Number of children: None    Years of education: None    Highest education level: None   Occupational History    None   Social Needs    Financial resource strain: Not hard at all   [place] insecurity     Worry: Never true     Inability: Never true    Transportation needs     Medical: No     Non-medical: No   Tobacco Use    Smoking status: Never Smoker    Smokeless tobacco: Never Used   Substance and Sexual Activity    Alcohol use: No    Drug use: Never    Sexual activity: None   Lifestyle    Physical activity     Days per week: None     Minutes per session: None    Stress: None   Relationships    Social connections     Talks on phone: None     Gets together: None     Attends Hindu service: None     Active member of club or organization: None     Attends meetings of clubs or organizations: None     Relationship status: None    Intimate partner violence     Fear of current or ex partner: None     Emotionally abused: None     Physically abused: None     Forced sexual activity: None   Other Topics Concern    None   Social History Narrative    None       Allergies   Allergen Reactions    Latex     Amoxicillin-Pot Clavulanate      Other reaction(s): Vomiting    Carbidopa-Levodopa      Other reaction(s): Unknown  Effects the eyes    Iv [Iodides]     Morphine Other (See Comments)     Made her go 'berserk '    Nitrofurantoin Other (See Comments)     Myalgia    Plavix [Clopidogrel Bisulfate]     Pravastatin Other (See Comments)     Myalgia       Current Outpatient Medications   Medication Sig Dispense Refill    atorvastatin (LIPITOR) 20 MG tablet Take 1 tablet by mouth daily 90 tablet 3    pantoprazole (PROTONIX) 40 MG tablet Take 1 tablet by mouth 2 times daily (before meals) 60 tablet 5    normal pulses. Murmur heard. Pulses:       Carotid pulses are on the right side with bruit and on the left side with bruit. Pulmonary/Chest: Effort normal and breath sounds normal. She has no wheezes. She has no rales. She exhibits no tenderness. Abdominal: Soft. Bowel sounds are normal. There is no abdominal tenderness. Musculoskeletal: Normal range of motion. General: No tenderness or edema. Neurological: She is alert and oriented to person, place, and time. Skin: Skin is warm. No cyanosis. Nails show no clubbing.        LABS:  CBC:   Lab Results   Component Value Date    WBC 6.9 09/04/2020    RBC 4.46 09/04/2020    RBC 4.35 06/01/2012    HGB 13.2 09/04/2020    HCT 40.2 09/04/2020    MCV 90.1 09/04/2020    MCH 29.6 09/04/2020    MCHC 32.8 09/04/2020    RDW 14.2 09/04/2020     09/04/2020    MPV 9.0 09/03/2014     Lipids:  Lab Results   Component Value Date    CHOL 169 09/21/2019    CHOL 105 06/16/2016    CHOL 189 09/02/2014     Lab Results   Component Value Date    TRIG 132 06/16/2016    TRIG 188 09/02/2014    TRIG 192 04/16/2014     Lab Results   Component Value Date    HDL 40 06/16/2016    HDL 49 09/02/2014    HDL 33 (L) 04/16/2014     Lab Results   Component Value Date    LDLCALC 39 06/16/2016    LDLCALC 102 09/02/2014    LDLCALC 101 04/16/2014     No results found for: LABVLDL, VLDL  Lab Results   Component Value Date    CHOLHDLRATIO 4.3 05/31/2012    CHOLHDLRATIO 4.6 05/30/2012     CMP:    Lab Results   Component Value Date     09/04/2020    K 3.9 09/04/2020     09/04/2020    CO2 25 09/04/2020    BUN 11 09/04/2020    CREATININE 0.65 09/04/2020    GFRAA >60.0 09/04/2020    LABGLOM >60.0 09/04/2020    GLUCOSE 79 09/04/2020    GLUCOSE 139 06/01/2012    PROT 6.5 09/04/2020    LABALBU 4.0 09/04/2020    LABALBU 4.1 05/31/2012    CALCIUM 9.3 09/04/2020    BILITOT 0.3 09/04/2020    ALKPHOS 91 09/04/2020    AST 20 09/04/2020    ALT 15 09/04/2020     BMP:    Lab Results   Component Value Date     09/04/2020    K 3.9 09/04/2020     09/04/2020    CO2 25 09/04/2020    BUN 11 09/04/2020    LABALBU 4.0 09/04/2020    LABALBU 4.1 05/31/2012    CREATININE 0.65 09/04/2020    CALCIUM 9.3 09/04/2020    GFRAA >60.0 09/04/2020    LABGLOM >60.0 09/04/2020    GLUCOSE 79 09/04/2020    GLUCOSE 139 06/01/2012     Magnesium:    Lab Results   Component Value Date    MG 2.2 12/26/2019    MG 2.1 05/30/2012     TSH:  Lab Results   Component Value Date    TSH 1.750 09/02/2014       Patient Active Problem List   Diagnosis    Dysphagia    Arterial fibromuscular dysplasia (HCC)    Fibromyalgia    Gastroesophageal reflux disease    Obstructive sleep apnea syndrome    Peripheral neuropathy (HCC)    Varicose veins of lower extremity    Chest pain    BEATRICE (obstructive sleep apnea)    Stenosis of right carotid artery    Dyslipidemia    BEATRICE on CPAP    Essential hypertension    Dizzy    Gastric polyp    Esophageal stricture       There are no discontinued medications. Modified Medications    No medications on file       Orders Placed This Encounter   Medications    atorvastatin (LIPITOR) 20 MG tablet     Sig: Take 1 tablet by mouth daily     Dispense:  90 tablet     Refill:  3       Assessment/Plan:    1. Essential hypertension   stable   And we will dc Low dose Losartan 25 given Postional dizizness. - stable     2. Chest pain, unspecified type     3. BEATRICE (obstructive sleep apnea)  Will need Pul referral   Not using CPAP. --- did not see Pulm    4. Stenosis of right carotid artery - resume ASA w Food. 5. Dyslipidemia - pt stopped Atorvastatin. - will resume atorva 20.     6. GERD- try Protonix 40 bid - much improved. 7. Dysphagia - s/p Esophageal dilation. 8. Car brutis- CUS stable   Counseling:  Heart Healthy Lifestyle, Low Salt Diet, Take Precautions to Prevent Falls and Walk Daily    Return in about 4 months (around 9/4/2021).       Electronically signed by Leilani Ga MD on 5/4/2021 at 1:10 PM

## 2021-06-01 ENCOUNTER — OFFICE VISIT (OUTPATIENT)
Dept: FAMILY MEDICINE CLINIC | Age: 76
End: 2021-06-01
Payer: MEDICARE

## 2021-06-01 VITALS
HEART RATE: 81 BPM | OXYGEN SATURATION: 100 % | TEMPERATURE: 95.2 F | HEIGHT: 65 IN | BODY MASS INDEX: 25.66 KG/M2 | SYSTOLIC BLOOD PRESSURE: 132 MMHG | WEIGHT: 154 LBS | DIASTOLIC BLOOD PRESSURE: 80 MMHG

## 2021-06-01 DIAGNOSIS — B96.89 ACUTE BACTERIAL SINUSITIS: ICD-10-CM

## 2021-06-01 DIAGNOSIS — R35.0 FREQUENCY OF URINATION: ICD-10-CM

## 2021-06-01 DIAGNOSIS — J01.90 ACUTE BACTERIAL SINUSITIS: ICD-10-CM

## 2021-06-01 DIAGNOSIS — N30.01 ACUTE CYSTITIS WITH HEMATURIA: Primary | ICD-10-CM

## 2021-06-01 LAB
BILIRUBIN, POC: NORMAL
BLOOD URINE, POC: NORMAL
CLARITY, POC: CLEAR
COLOR, POC: YELLOW
GLUCOSE URINE, POC: NORMAL
KETONES, POC: NORMAL
LEUKOCYTE EST, POC: NORMAL
NITRITE, POC: NORMAL
PH, POC: 6
PROTEIN, POC: NORMAL
SPECIFIC GRAVITY, POC: 1.02
UROBILINOGEN, POC: NORMAL

## 2021-06-01 PROCEDURE — 1036F TOBACCO NON-USER: CPT | Performed by: PHYSICIAN ASSISTANT

## 2021-06-01 PROCEDURE — 4040F PNEUMOC VAC/ADMIN/RCVD: CPT | Performed by: PHYSICIAN ASSISTANT

## 2021-06-01 PROCEDURE — 99213 OFFICE O/P EST LOW 20 MIN: CPT | Performed by: PHYSICIAN ASSISTANT

## 2021-06-01 PROCEDURE — 1123F ACP DISCUSS/DSCN MKR DOCD: CPT | Performed by: PHYSICIAN ASSISTANT

## 2021-06-01 PROCEDURE — 1090F PRES/ABSN URINE INCON ASSESS: CPT | Performed by: PHYSICIAN ASSISTANT

## 2021-06-01 PROCEDURE — G8399 PT W/DXA RESULTS DOCUMENT: HCPCS | Performed by: PHYSICIAN ASSISTANT

## 2021-06-01 PROCEDURE — 81002 URINALYSIS NONAUTO W/O SCOPE: CPT | Performed by: PHYSICIAN ASSISTANT

## 2021-06-01 PROCEDURE — G8427 DOCREV CUR MEDS BY ELIG CLIN: HCPCS | Performed by: PHYSICIAN ASSISTANT

## 2021-06-01 PROCEDURE — G8417 CALC BMI ABV UP PARAM F/U: HCPCS | Performed by: PHYSICIAN ASSISTANT

## 2021-06-01 RX ORDER — PHENAZOPYRIDINE HYDROCHLORIDE 100 MG/1
100 TABLET, FILM COATED ORAL 3 TIMES DAILY PRN
Qty: 30 TABLET | Refills: 0 | Status: SHIPPED | OUTPATIENT
Start: 2021-06-01 | End: 2021-06-11

## 2021-06-01 RX ORDER — CEPHALEXIN 250 MG/1
250 CAPSULE ORAL 2 TIMES DAILY
Qty: 20 CAPSULE | Refills: 0 | Status: SHIPPED | OUTPATIENT
Start: 2021-06-01 | End: 2021-06-11

## 2021-06-01 ASSESSMENT — PATIENT HEALTH QUESTIONNAIRE - PHQ9
SUM OF ALL RESPONSES TO PHQ QUESTIONS 1-9: 0
SUM OF ALL RESPONSES TO PHQ9 QUESTIONS 1 & 2: 0
1. LITTLE INTEREST OR PLEASURE IN DOING THINGS: 0
2. FEELING DOWN, DEPRESSED OR HOPELESS: 0

## 2021-06-01 ASSESSMENT — ENCOUNTER SYMPTOMS
DIARRHEA: 0
VOMITING: 0
BACK PAIN: 0
SHORTNESS OF BREATH: 0
COUGH: 0
ABDOMINAL PAIN: 0
TROUBLE SWALLOWING: 0
SINUS PRESSURE: 1
CHEST TIGHTNESS: 0
RHINORRHEA: 1
SINUS PAIN: 1

## 2021-06-01 NOTE — PROGRESS NOTES
Subjective:      Patient ID: Larry Abbott is a 64 y.o. female who presents today for:  Chief Complaint   Patient presents with    Urinary Tract Infection     Pt is here c/o possible UTI. Pt states she has increased urinating frequency, has strong odor, and burning when urinating. Pt states she has had symtoms for about a week.  Sinus Problem     Pt is here c/o sinus issues. Pt states she noticed blood in her L nostril this morning. Pt also states she has R ear pain and headaches, says it's been going on for about 2 weeks. HPI  64year old female who complains of burning with urination with urinary frequency and urgency. She had a bladder stimulator placed in 4/2021 with no improvement. She also complains os sinus pressure and pain with right ear pain. Denies fever or chills.      Past Medical History:   Diagnosis Date    Anticoagulant long-term use     Carotid artery stenosis     Family history of early CAD 6/28/2016    Fibromyalgia     GERD (gastroesophageal reflux disease)     Hypertension     Neuropathy     Sleep apnea     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 myelopathy or radiculopathy      Past Surgical History:   Procedure Laterality Date    BLADDER SURGERY      stimulator implant    BLADDER SUSPENSION      CHOLECYSTECTOMY      COLONOSCOPY  9/22/2020    COLONOSCOPY WITH BIOPSY AND POLYPECTOMY performed by Oneil Lundborg, MD at [address] [place] [address] ENDOSCOPY N/A 9/22/2020    EGD WITH POLYPECTOMY AND DILATION performed by Oneil Lundborg, [facility] at [facility] Marital status:      Spouse name: Not on file    Number of children: Not on file    Years of education: Not on file    Highest education level: Not on file   Occupational History    Not on file   Tobacco Use    Smoking status: Never Smoker    Smokeless tobacco: Never Used   Substance and Sexual Activity    Alcohol use: No    Drug use: Never    Sexual activity: Not on file   Other Topics Concern    Not on file   Social History Narrative    Not on file     Social Determinants of Health     Financial Resource Strain: Low Risk     Difficulty of Paying Living Expenses: Not hard at all   Food Insecurity: No Food Insecurity    Worried About Running Out of Food in the Last Year: Never true    [address] in the Last Year: Never true   Transportation Needs: No Transportation Needs    Lack of Transportation (Medical): No    Lack of Transportation (Non-Medical):  No   Physical Activity:     Days of Exercise per Week:     Minutes of Exercise per Session:    Stress:     Feeling of Stress :    Social Connections:     Frequency of Communication with Friends and Family:     Frequency of Social Gatherings with Friends and Family:     Attends Worship Services:     Active Member of Clubs or Organizations:     Attends Club or Organization Meetings:     Marital Status:    Intimate Partner Violence:     Fear of Current or Ex-Partner:     Emotionally Abused:     Physically Abused:     Sexually Abused:      Family History   Problem Relation Age of Onset    Crohn's Disease Sister     Colon Cancer Neg Hx      Allergies   Allergen Reactions    Latex     Amoxicillin-Pot Clavulanate      Other reaction(s): Vomiting    Carbidopa-Levodopa      Other reaction(s): Unknown  Effects the eyes    Iv [Iodides]     Morphine Other (See Comments)     Made her go 'berserk '    Nitrofurantoin Other (See Comments)     Myalgia    Plavix [Clopidogrel Bisulfate]     Pravastatin Other (See Comments)     Myalgia     Current Outpatient Medications   Medication Sig Dispense Refill    atorvastatin (LIPITOR) 20 MG tablet Take 1 tablet by mouth daily 90 tablet 3    pantoprazole (PROTONIX) 40 MG tablet Take 1 tablet by mouth 2 times daily (before meals) 60 tablet 5    fluticasone (FLONASE) 50 MCG/ACT nasal spray 1 spray by Nasal route 2 times daily 1 Bottle 3    citalopram (CELEXA) 20 MG tablet Take 1 tablet by mouth daily 16 tablet 0    prednisoLONE acetate (PRED FORTE) 1 % ophthalmic suspension INSTILL 1 DROP INTO RIGHT EYE 6 TIMES A DAY FOR 2 DAYS THEN DECREASE TO 4 TIMES A DAY      primidone (MYSOLINE) 50 MG tablet Take 0.5 tablets by mouth nightly 16 tablet 3    aspirin EC 81 MG EC tablet Take 1 tablet by mouth daily 90 tablet 3    Wheat Dextrin (BENEFIBER) POWD Start with 2 to 3 tsp daily with 8 oz of water, titrate based on stool consistency, titrate down based on bloating & abdominal cramps 1 Can 3     No current facility-administered medications for this visit. Review of Systems   Constitutional: Negative for activity change, appetite change, chills, fever and unexpected weight change. HENT: Positive for rhinorrhea, sinus pressure (frontal and maxillary) and sinus pain (frontal and maxillary). Negative for drooling, ear pain, nosebleeds and trouble swallowing. Respiratory: Negative for cough, chest tightness and shortness of breath. Cardiovascular: Negative for chest pain and leg swelling. Gastrointestinal: Negative for abdominal pain, diarrhea and vomiting. Endocrine: Negative for polydipsia and polyphagia. Genitourinary: Negative for dysuria, flank pain and frequency. Musculoskeletal: Negative for back pain and myalgias. Skin: Negative for pallor and rash. Neurological: Negative for syncope, weakness and headaches. Hematological: Does not bruise/bleed easily. Psychiatric/Behavioral: Negative for agitation, behavioral problems and confusion. All other systems reviewed and are negative. Objective:   /80 (Site: Right Upper Arm, Position: Sitting, Cuff Size: Small Adult)   Pulse 81   Temp 95.2 °F (35.1 °C)   Ht 5' 5\" (1.651 m)   Wt 154 lb (69.9 kg)   SpO2 100%   BMI 25.63 kg/m²     Physical Exam  Vitals and nursing note reviewed.    Constitutional:       General: She is awake. She is not in acute distress. Appearance: Normal appearance. She is well-developed and normal weight. She is not ill-appearing, toxic-appearing or diaphoretic. Comments: No photophobia. No phonophobia. HENT:      Head: Normocephalic and atraumatic. No Conley's sign. Right Ear: External ear normal.      Left Ear: External ear normal.      Nose: Nose normal. No congestion or rhinorrhea. Mouth/Throat:      Mouth: Mucous membranes are moist.      Pharynx: Oropharynx is clear. No oropharyngeal exudate or posterior oropharyngeal erythema. Eyes:      General: No scleral icterus. Right eye: No foreign body or discharge. Left eye: No discharge. Extraocular Movements: Extraocular movements intact. Conjunctiva/sclera: Conjunctivae normal.      Left eye: No exudate. Pupils: Pupils are equal, round, and reactive to light. Neck:      Vascular: No JVD. Trachea: No tracheal deviation. Comments: No meningismus. Cardiovascular:      Rate and Rhythm: Normal rate and regular rhythm. Heart sounds: Normal heart sounds. Heart sounds not distant. No murmur heard. No friction rub. No gallop. Pulmonary:      Effort: Pulmonary effort is normal. No respiratory distress. Breath sounds: Normal breath sounds. No stridor. No wheezing, rhonchi or rales. Chest:      Chest wall: No tenderness. Abdominal:      General: Abdomen is flat. Bowel sounds are normal. There is no distension. Palpations: Abdomen is soft. There is no mass. Tenderness: There is no abdominal tenderness. There is no right CVA tenderness, left CVA tenderness, guarding or rebound. Hernia: No hernia is present. Musculoskeletal:         General: No swelling, tenderness, deformity or signs of injury. Normal range of motion. Cervical back: Normal range of motion and neck supple. No rigidity. Lymphadenopathy:      Head:      Right side of head: No submental adenopathy. Left side of head: No submental adenopathy. Skin:     General: Skin is warm and dry. Capillary Refill: Capillary refill takes less than 2 seconds. Coloration: Skin is not jaundiced or pale. Findings: No bruising, erythema, lesion or rash. Neurological:      General: No focal deficit present. Mental Status: She is alert and oriented to person, place, and time. Mental status is at baseline. Cranial Nerves: No cranial nerve deficit. Sensory: No sensory deficit. Motor: No weakness. Coordination: Coordination normal.      Deep Tendon Reflexes: Reflexes are normal and symmetric. Psychiatric:         Mood and Affect: Mood normal.         Behavior: Behavior normal. Behavior is cooperative. Thought Content: Thought content normal.         Judgment: Judgment normal.         Assessment:       Diagnosis Orders   1. Acute cystitis with hematuria     2. Acute bacterial sinusitis     3. Frequency of urination  POCT Urinalysis no Micro    Culture, Urine     Results for POC orders placed in visit on 06/01/21   POCT Urinalysis no Micro   Result Value Ref Range    Color, UA yellow     Clarity, UA clear     Glucose, UA POC neg     Bilirubin, UA neg     Ketones, UA neg     Spec Grav, UA 1.025     Blood, UA POC +-     pH, UA 6.0     Protein, UA POC neg     Urobilinogen, UA 3.5 umol/L     Leukocytes, UA neg     Nitrite, UA neg       Plan:     Assessment & Plan   Chloe Yuan was seen today for urinary tract infection and sinus problem. Diagnoses and all orders for this visit:    Acute cystitis with hematuria    Acute bacterial sinusitis    Frequency of urination  -     POCT Urinalysis no Micro  -     Culture, Urine; Future      Orders Placed This Encounter   Procedures    Culture, Urine     Standing Status:   Future     Standing Expiration Date:   6/1/2022     Order Specific Question:   Specify (ex-cath, midstream, cysto, etc)?      Answer:   mid stream    POCT Urinalysis no Micro No orders of the defined types were placed in this encounter. There are no discontinued medications. Return if symptoms worsen or fail to improve. Reviewed with the patient/family: current clinical status & medications. Side effects of the medication prescribed today, as well as treatment plan/rationale and result expectations have been discussed with the patient/family who expresses understanding. Patient will be discharged home in stable condition. Follow up with PCP to evaluate treatment results or return if symptoms worsen or fail to improve. Discussed signs and symptoms which require immediate follow-up in ED/call to 911. Understanding verbalized. I have reviewed the patient's medical history in detail and updated the computerized patient record.     YOMI Poe

## 2021-06-04 LAB
ORGANISM: ABNORMAL
URINE CULTURE, ROUTINE: ABNORMAL

## 2021-06-29 ENCOUNTER — OFFICE VISIT (OUTPATIENT)
Dept: FAMILY MEDICINE CLINIC | Age: 76
End: 2021-06-29
Payer: MEDICARE

## 2021-06-29 VITALS
TEMPERATURE: 98 F | DIASTOLIC BLOOD PRESSURE: 80 MMHG | RESPIRATION RATE: 14 BRPM | HEART RATE: 72 BPM | BODY MASS INDEX: 25.66 KG/M2 | HEIGHT: 65 IN | SYSTOLIC BLOOD PRESSURE: 138 MMHG | OXYGEN SATURATION: 98 % | WEIGHT: 154 LBS

## 2021-06-29 DIAGNOSIS — I10 ESSENTIAL HYPERTENSION: Primary | ICD-10-CM

## 2021-06-29 DIAGNOSIS — M62.838 MUSCLE SPASM: ICD-10-CM

## 2021-06-29 DIAGNOSIS — H91.90 DECREASED HEARING, UNSPECIFIED LATERALITY: ICD-10-CM

## 2021-06-29 DIAGNOSIS — R26.81 GAIT INSTABILITY: ICD-10-CM

## 2021-06-29 PROCEDURE — 1036F TOBACCO NON-USER: CPT | Performed by: INTERNAL MEDICINE

## 2021-06-29 PROCEDURE — G8427 DOCREV CUR MEDS BY ELIG CLIN: HCPCS | Performed by: INTERNAL MEDICINE

## 2021-06-29 PROCEDURE — G8417 CALC BMI ABV UP PARAM F/U: HCPCS | Performed by: INTERNAL MEDICINE

## 2021-06-29 PROCEDURE — 1123F ACP DISCUSS/DSCN MKR DOCD: CPT | Performed by: INTERNAL MEDICINE

## 2021-06-29 PROCEDURE — G8399 PT W/DXA RESULTS DOCUMENT: HCPCS | Performed by: INTERNAL MEDICINE

## 2021-06-29 PROCEDURE — 99214 OFFICE O/P EST MOD 30 MIN: CPT | Performed by: INTERNAL MEDICINE

## 2021-06-29 PROCEDURE — 4040F PNEUMOC VAC/ADMIN/RCVD: CPT | Performed by: INTERNAL MEDICINE

## 2021-06-29 PROCEDURE — 1090F PRES/ABSN URINE INCON ASSESS: CPT | Performed by: INTERNAL MEDICINE

## 2021-06-29 RX ORDER — ZOSTER VACCINE RECOMBINANT, ADJUVANTED 50 MCG/0.5
0.5 KIT INTRAMUSCULAR SEE ADMIN INSTRUCTIONS
Qty: 0.5 ML | Refills: 0 | Status: SHIPPED | OUTPATIENT
Start: 2021-06-29 | End: 2021-12-26

## 2021-06-29 RX ORDER — NYSTATIN 100000 [USP'U]/G
POWDER TOPICAL
Qty: 1 BOTTLE | Refills: 3 | Status: SHIPPED | OUTPATIENT
Start: 2021-06-29 | End: 2021-07-29

## 2021-06-29 ASSESSMENT — ENCOUNTER SYMPTOMS
EYE ITCHING: 0
TROUBLE SWALLOWING: 0
APNEA: 0
BACK PAIN: 0
WHEEZING: 0
ABDOMINAL PAIN: 0
BLOOD IN STOOL: 0
SINUS PRESSURE: 0
FACIAL SWELLING: 0
SHORTNESS OF BREATH: 0
EYE PAIN: 0
PHOTOPHOBIA: 0
VOMITING: 0
ABDOMINAL DISTENTION: 0
EYE REDNESS: 0
EYE DISCHARGE: 0
NAUSEA: 0
CHEST TIGHTNESS: 0
COUGH: 0
RHINORRHEA: 0
RECTAL PAIN: 0
COLOR CHANGE: 0
VOICE CHANGE: 0
SINUS PAIN: 0
CONSTIPATION: 0
SORE THROAT: 0
DIARRHEA: 0

## 2021-06-30 ENCOUNTER — VIRTUAL VISIT (OUTPATIENT)
Dept: FAMILY MEDICINE CLINIC | Age: 76
End: 2021-06-30
Payer: MEDICARE

## 2021-06-30 DIAGNOSIS — E87.0 HYPERNATREMIA: Primary | ICD-10-CM

## 2021-06-30 DIAGNOSIS — Z00.00 ROUTINE GENERAL MEDICAL EXAMINATION AT A HEALTH CARE FACILITY: Primary | ICD-10-CM

## 2021-06-30 PROCEDURE — G0438 PPPS, INITIAL VISIT: HCPCS | Performed by: INTERNAL MEDICINE

## 2021-06-30 PROCEDURE — 1123F ACP DISCUSS/DSCN MKR DOCD: CPT | Performed by: INTERNAL MEDICINE

## 2021-06-30 PROCEDURE — 4040F PNEUMOC VAC/ADMIN/RCVD: CPT | Performed by: INTERNAL MEDICINE

## 2021-06-30 ASSESSMENT — LIFESTYLE VARIABLES: HOW OFTEN DO YOU HAVE A DRINK CONTAINING ALCOHOL: 0

## 2021-06-30 ASSESSMENT — PATIENT HEALTH QUESTIONNAIRE - PHQ9
2. FEELING DOWN, DEPRESSED OR HOPELESS: 0
1. LITTLE INTEREST OR PLEASURE IN DOING THINGS: 0
SUM OF ALL RESPONSES TO PHQ QUESTIONS 1-9: 0
SUM OF ALL RESPONSES TO PHQ9 QUESTIONS 1 & 2: 0

## 2021-06-30 NOTE — PATIENT INSTRUCTIONS
heart-healthy diet is one that limits sodium , certain types of fat , and cholesterol . This type of diet is recommended for:   · People with any form of cardiovascular disease (eg, coronary heart disease , peripheral vascular disease , previous heart attack , previous stroke )   · People with risk factors for cardiovascular disease, such as high blood pressure , high cholesterol , or diabetes   · Anyone who wants to lower their risk of developing cardiovascular disease   Sodium    Sodium is a mineral found in many foods. In general, most people consume much more sodium than they need. Diets high in sodium can increase blood pressure and lead to edema (water retention). On a heart-healthy diet, you should consume no more than 2,300 mg (milligrams) of sodium per dayabout the amount in one teaspoon of table salt. The foods highest in sodium include table salt (about 50% sodium), processed foods, convenience foods, and preserved foods. Cholesterol    Cholesterol is a fat-like, waxy substance in your blood. Our bodies make some cholesterol. It is also found in animal products, with the highest amounts in fatty meat, egg yolks, whole milk, cheese, shellfish, and organ meats. On a heart-healthy diet, you should limit your cholesterol intake to less than 200 mg per day. It is normal and important to have some cholesterol in your bloodstream. But too much cholesterol can cause plaque to build up within your arteries, which can eventually lead to a heart attack or stroke. The two types of cholesterol that are most commonly referred to are:   · Low-density lipoprotein (LDL) cholesterol  Also known as bad cholesterol, this is the cholesterol that tends to build up along your arteries. Bad cholesterol levels are increased by eating fats that are saturated or hydrogenated. Optimal level of this cholesterol is less than 100. Over 130 starts to get risky for heart disease.    · High-density lipoprotein (HDL) cholesterol  Also known as good cholesterol, this type of cholesterol actually carries cholesterol away from your arteries and may, therefore, help lower your risk of having a heart attack. You want this level to be high (ideally greater than 60). It is a risk to have a level less than 40. You can raise this good cholesterol by eating olive oil, canola oil, avocados, or nuts. Exercise raises this level, too. Fat    Fat is calorie dense and packs a lot of calories into a small amount of food. Even though fats should be limited due to their high calorie content, not all fats are bad. In fact, some fats are quite healthful. Fat can be broken down into four main types. · The good-for-you fats are:   ¨ Monounsaturated fat  found in oils such as olive and canola, avocados, and nuts and natural nut butters; can decrease cholesterol levels, while keeping levels of HDL cholesterol high   ¨ Polyunsaturated fat  found in oils such as safflower, sunflower, soybean, corn, and sesame; can decrease total cholesterol and LDL cholesterol   ¨ Omega-3 fatty acids  particularly those found in fatty fish (such as salmon, trout, tuna, mackerel, herring, and sardines); can decrease risk of arrhythmias, decrease triglyceride levels, and slightly lower blood pressure   · The fats that you want to limit are:   ¨ Saturated fat  found in animal products, many fast foods, and a few vegetables; increases total blood cholesterol, including LDL levels   § Animal fats that are saturated include: butter, lard, whole-milk dairy products, meat fat, and poultry skin   § Vegetable fats that are saturated include: hydrogenated shortening, palm oil, coconut oil, cocoa butter   ¨ Hydrogenated or trans fat  found in margarine and vegetable shortening, most shelf stable snack foods, and fried foods; increases LDL and decreases HDL     It is generally recommended that you limit your total fat for the day to less than 30% of your total calories.  If you follow an 1800-calorie heart healthy diet, for example, this would mean 60 grams of fat or less per day. Saturated fat and trans fat in your diet raises your blood cholesterol the most, much more than dietary cholesterol does. For this reason, on a heart-healthy diet, less than 7% of your calories should come from saturated fat and ideally 0% from trans fat. On an 1800-calorie diet, this translates into less than 14 grams of saturated fat per day, leaving 46 grams of fat to come from mono- and polyunsaturated fats.    Food Choices on a Heart Healthy Diet   Food Category   Foods Recommended   Foods to Avoid   Grains   Breads and rolls without salted tops Most dry and cooked cereals Unsalted crackers and breadsticks Low-sodium or homemade breadcrumbs or stuffing All rice and pastas   Breads, rolls, and crackers with salted tops High-fat baked goods (eg, muffins, donuts, pastries) Quick breads, self-rising flour, and biscuit mixes Regular bread crumbs Instant hot cereals Commercially prepared rice, pasta, or stuffing mixes   Vegetables   Most fresh, frozen, and low-sodium canned vegetables Low-sodium and salt-free vegetable juices Canned vegetables if unsalted or rinsed   Regular canned vegetables and juices, including sauerkraut and pickled vegetables Frozen vegetables with sauces Commercially prepared potato and vegetable mixes   Fruits   Most fresh, frozen, and canned fruits All fruit juices   Fruits processed with salt or sodium   Milk   Nonfat or low-fat (1%) milk Nonfat or low-fat yogurt Cottage cheese, low-fat ricotta, cheeses labeled as low-fat and low-sodium   Whole milk Reduced-fat (2%) milk Malted and chocolate milk Full fat yogurt Most cheeses (unless low-fat and low salt) Buttermilk (no more than 1 cup per week)   Meats and Beans   Lean cuts of fresh or frozen beef, veal, lamb, or pork (look for the word loin) Fresh or frozen poultry without the skin Fresh or frozen fish and some shellfish Egg whites and egg substitutes (Limit whole eggs to three per week) Tofu Nuts or seeds (unsalted, dry-roasted), low-sodium peanut butter Dried peas, beans, and lentils   Any smoked, cured, salted, or canned meat, fish, or poultry (including downing, chipped beef, cold cuts, hot dogs, sausages, sardines, and anchovies) Poultry skins Breaded and/or fried fish or meats Canned peas, beans, and lentils Salted nuts   Fats and Oils   Olive oil and canola oil Low-sodium, low-fat salad dressings and mayonnaise   Butter, margarine, coconut and palm oils, downing fat   Snacks, Sweets, and Condiments   Low-sodium or unsalted versions of broths, soups, soy sauce, and condiments Pepper, herbs, and spices; vinegar, lemon, or lime juice Low-fat frozen desserts (yogurt, sherbet, fruit bars) Sugar, cocoa powder, honey, syrup, jam, and preserves Low-fat, trans-fat free cookies, cakes, and pies Cole and animal crackers, fig bars, arturo snaps   High-fat desserts Broth, soups, gravies, and sauces, made from instant mixes or other high-sodium ingredients Salted snack foods Canned olives Meat tenderizers, seasoning salt, and most flavored vinegars   Beverages   Low-sodium carbonated beverages Tea and coffee in moderation Soy milk   Commercially softened water   Suggestions   · Make whole grains, fruits, and vegetables the base of your diet. · Choose heart-healthy fats such as canola, olive, and flaxseed oil, and foods high in heart-healthy fats, such as nuts, seeds, soybeans, tofu, and fish. · Eat fish at least twice per week; the fish highest in omega-3 fatty acids and lowest in mercury include salmon, herring, mackerel, sardines, and canned chunk light tuna. If you eat fish less than twice per week or have high triglycerides, talk to your doctor about taking fish oil supplements. · Read food labels.    ¨ For products low in fat and cholesterol, look for fat free, low-fat, cholesterol free, saturated fat free, and trans fat freeAlso scan the Nutrition Facts Label, which lists saturated fat, trans fat, and cholesterol amounts. ¨ For products low in sodium, look for sodium free, very low sodium, low sodium, no added salt, and unsalted   · Skip the salt when cooking or at the table; if food needs more flavor, get creative and try out different herbs and spices. Garlic and onion also add substantial flavor to foods. · Trim any visible fat off meat and poultry before cooking, and drain the fat off after hemphill. · Use cooking methods that require little or no added fat, such as grilling, boiling, baking, poaching, broiling, roasting, steaming, stir-frying, and sauting. · Avoid fast food and convenience food. They tend to be high in saturated and trans fat and have a lot of added salt. · Talk to a registered dietitian for individualized diet advice. Last Reviewed: March 2011 Rob Monroe MS, MPH, RD   Updated: 3/29/2011   ·     Keep Your Memory [place]       Many factors can affect your ability to remembera hectic lifestyle, aging, stress, chronic disease, and certain medicines. But, there are steps you can take to sharpen your mind and help preserve your memory. Challenge Your Brain   Regularly challenging your mind may help keeps it in top shape. Good mental exercises include:   Crossword puzzlesUse a dictionary if you need it; you will learn more that way. Brainteasers Try some! Crafts, such as wood working and sewing   Hobbies, such as gardening and building model airplanes   SocializingVisit old friends or join groups to meet new ones.    Reading   Learning a new language   Taking a class, whether it be art history or kelley chi   TravelingExperience the food, history, and culture of your destination   Learning to use a computer   Going to museums, the theater, or thought-provoking movies   Changing things in your daily life, such as reversing your pattern in the grocery store or brushing your teeth using your nondominant hand   Use remember what is important when your mind is cluttered. Make time for relaxation. Choose activities that calm you down, and make it routine. Manage Chronic Conditions    Side effects of high blood pressure , diabetes, and heart disease can interfere with mental function. Many of the lifestyle steps discussed here can help manage these conditions. Strive to eat a healthy diet, exercise regularly, get stress under control, and follow your doctor's advice for your condition. Minimize Medications    Talk to your doctor about the medicines that you take. Some may be unnecessary. Also, healthy lifestyle habits may lower the need for certain drugs. Last Reviewed: April 2010 Ashvin Durbin MD   Updated: 4/13/2010   ·     [address]       As we get older, changes in balance, gait, strength, vision, hearing, and cognition make even the most youthful senior more prone to accidents. Falls are one of the leading health risks for older people. This increased risk of falling is related to:   Aging process (eg, decreased muscle strength, slowed reflexes)   Higher incidence of chronic health problems (eg, arthritis, diabetes) that may limit mobility, agility or sensory awareness   Side effects of medicine (eg, dizziness, blurred vision)especially medicines like prescription pain medicines and drugs used to treat mental health conditions   Depending on the brittleness of your bones, the consequences of a fall can be serious and long lasting. Home Life   Research by the Association of Aging [facility]) shows that some home accidents among older adults can be prevented by making simple lifestyle changes and basic modifications and repairs to the home environment. Here are some lifestyle changes that experts recommend:   Have your hearing and vision checked regularly. Be sure to wear prescription glasses that are right for you. Speak to your doctor or pharmacist about the possible side effects of your medicines.  A number of medicines can cause dizziness. If you have problems with sleep, talk to your doctor. Limit your intake of alcohol. If necessary, use a cane or walker to help maintain your balance. Wear supportive, rubber-soled shoes, even at home. If you live in a region that gets wintry weather, you may want to put special cleats on your shoes to prevent you from slipping on the snow and ice. Exercise regularly to help maintain muscle tone, agility, and balance. Always hold the banister when going up or down stairs. Also, use  bars when getting in or out of the bath or shower, or using the toilet. To avoid dizziness, get up slowly from a lying down position. Sit up first, dangling your legs for a minute or two before rising to a standing position. Overall Home Safety Check   According to the Consumer [employer] Safety Commision's \"Older Consumer Home Safety Checklist,\" it is important to check for potential hazards in each room. And remember, proper lighting is an essential factor in home safety. If you cannot see clearly, you are more likely to fall. Important questions to ask yourself include:   Are lamp, electric, extension, and telephone cords placed out of the flow of traffic and maintained in good condition? Have frayed cords been replaced? Are all small rugs and runners slip resistant? If not, you can secure them to the floor with a special double-sided carpet tape. Are smoke detectors properly locatedone on every floor of your home and one outside of every sleeping area? Are they in good working order? Are batteries replaced at least once a year? Do you have a well-maintained carbon monoxide detector outside every sleeping are in your home? Does your furniture layout leave plenty of space to maneuver between and around chairs, tables, beds, and sofas? Are hallways, stairs and passages between rooms well lit? Can you reach a lamp without getting out of bed?    Are floor surfaces well maintained? Shag rugs, high-pile carpeting, tile floors, and polished wood floors can be particularly slippery. Stairs should always have handrails and be carpeted or fitted with a non-skid tread. Is your telephone easily reachable. Is the cord safely tucked away? Room by Room   According to the Association of Aging, bathrooms and deep are the two most potentially hazardous rooms in your home. In the Kitchen    Be sure your stove is in proper working order and always make sure burners and the oven are off before you go out or go to sleep. Keep pots on the back burners, turn handles away from the front of the stove, and keep stove clean and free of grease build-up. Kitchen ventilation systems and range exhausts should be working properly. Keep flammable objects such as towels and pot holders away from the cooking area except when in use. Make sure kitchen curtains are tied back. Move cords and appliances away from the sink and hot surfaces. If extension cords are needed, install wiring guides so they do not hang over the sink, range, or working areas. Look for coffee pots, kettles and toaster ovens with automatic shut-offs. Keep a mop handy in the kitchen so you can wipe up spills instantly. You should also have a small fire extinguisher. Arrange your kitchen with frequently used items on lower shelves to avoid the need to stand on a stepstool to reach them. Make sure countertops are well-lit to avoid injuries while cutting and preparing food. In the Bathroom    Use a non-slip mat or decals in the tub and shower, since wet, soapy tile or porcelain surfaces are extremely slippery. Make sure bathroom rugs are non-skid or tape them firmly to the floor. Bathtubs should have at least one, preferably two, grab bars, firmly attached to structural supports in the wall.  (Do not use built-in soap holders or glass shower doors as grab bars.)    Tub seats fitted with non-slip material on the legs allow you to wash sitting down. For people with limited mobility, bathtub transfer benches allow you to slide safely into the tub. Raised toilet seats and toilet safety rails are helpful for those with knee or hip problems. In the [facility]    Make sure you use a nightlight and that the area around your bed is clear of potential obstacles. Be careful with electric blankets and never go to sleep with a heating pad, which can cause serious burns even if on a low setting. Use fire-resistant mattress covers and pillows, and NEVER smoke in bed. Keep a phone next to the bed that is programmed to dial 911 at the push of a button. If you have a chronic condition, you may want to sign on with an automatic call-in service. Typically the system includes a small pendant that connects directly to an emergency medical voice-response system. You should also make arrangements to stay in contact with someonefriend, neighbor, family memberon a regular schedule. Fire Prevention   According to the [employer]. (Smoke Alarms for Every) [address] [place] [address], senior citizens are one of the two highest risk groups for death and serious injuries due to residential fires. When cooking, wear short-sleeved items, never a bulky long-sleeved robe. The [facility]'s Safety Checklist for Older Consumers emphasizes the importance of checking basements, garages, workshops and storage areas for fire hazards, such as volatile liquids, piles of old rags or clothing and overloaded circuits. Never smoke in bed or when lying down on a couch or recliner chair. Small portable electric or kerosene heaters are responsible for many home fires and should be used cautiously if at all. If you do use one, be sure to keep them away from flammable materials. In case of fire, make sure you have a pre-established emergency exit plan. Have a professional check your fireplace and other fuel-burning appliances yearly.     Helping Hands   Baby boomers entering the dominguez years will continue to see the development of new products to help older adults live safely and independently in spite of age-related changes. Making Life More Livable  , by Annalee Miguel, lists over 1,000 products for \"living well in the mature years,\" such as bathing and mobility aids, household security devices, ergonomically designed knives and peelers, and faucet valves and knobs for temperature control. Medical supply stores and organizations are good sources of information about products that improve your quality of life and insure your safety.      Last Reviewed: November 2009 Emile Gaffney MD   Updated: 3/7/2011     ·

## 2021-06-30 NOTE — PROGRESS NOTES
Medicare Annual Wellness Visit  Name: Yun Stephentown Date: 2021   MRN: [id number] Sex: Female   Age: 64 y.o. Ethnicity: Non-/Non    : 1945 Race: Qi Osborne is here for Medicare AWV (Pt presents today for telephone medicare awv)    Screenings for behavioral, psychosocial and functional/safety risks, and cognitive dysfunction are all negative except as indicated below. These results, as well as other patient data from the [address] form, are documented in Flowsheets linked to this Encounter. Allergies   Allergen Reactions    Latex     Amoxicillin-Pot Clavulanate      Other reaction(s): Vomiting    Carbidopa-Levodopa      Other reaction(s): Unknown  Effects the eyes    Iv [Iodides]     Morphine Other (See Comments)     Made her go 'berserk '    Nitrofurantoin Other (See Comments)     Myalgia    Plavix [Clopidogrel Bisulfate]     Pravastatin Other (See Comments)     Myalgia       Prior to Visit Medications    Medication Sig Taking? Authorizing Provider   nystatin (MYCOSTATIN) 783546 UNIT/GM powder Apply 3 times daily.  Yes Mallika Perdomo MD   atorvastatin (LIPITOR) 20 MG tablet Take 1 tablet by mouth daily Yes Musa Husain MD   pantoprazole (PROTONIX) 40 MG tablet Take 1 tablet by mouth 2 times daily (before meals) Yes Mallika Perdomo MD   fluticasone (FLONASE) 50 MCG/ACT nasal spray 1 spray by Nasal route 2 times daily Yes Mallika Perdomo MD   prednisoLONE acetate (PRED FORTE) 1 % ophthalmic suspension INSTILL 1 DROP INTO RIGHT EYE 6 TIMES A DAY FOR 2 DAYS THEN DECREASE TO 4 TIMES A DAY Yes Historical Provider, MD   aspirin EC 81 MG EC tablet Take 1 tablet by mouth daily Yes Musa Husain MD   zoster recombinant adjuvanted vaccine [facility]) 50 MCG/0.5ML SUSR injection Inject 0.5 mLs into the muscle See Admin Instructions 1 dose now and repeat in 2-6 months  Mallika Perdomo MD   citalopram (CELEXA) 20 MG tablet Take 1 tablet by mouth daily  Patient not taking: Reported on 6/30/2021  Brinda Coulter MD   primidone (MYSOLINE) 50 MG tablet Take 0.5 tablets by mouth nightly  Patient not taking: Reported on 6/30/2021  Brinda Coulter MD   Wheat Dextrin (Lonell Fray) POWD Start with 2 to 3 tsp daily with 8 oz of water, titrate based on stool consistency, titrate down based on bloating & abdominal cramps  Patient not taking: Reported on 6/30/2021  Jaya Jaimes MD       Past Medical History:   Diagnosis Date    Anticoagulant long-term use     Carotid artery stenosis     Family history of early CAD 6/28/2016    Fibromyalgia     GERD (gastroesophageal reflux disease)     Hypertension     Neuropathy     Sleep apnea     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 myelopathy or radiculopathy        Past Surgical History:   Procedure Laterality Date    BLADDER SURGERY      stimulator implant    BLADDER SUSPENSION      CHOLECYSTECTOMY      COLONOSCOPY  9/22/2020    COLONOSCOPY WITH BIOPSY AND POLYPECTOMY performed by Lucero Green MD at [address]      UPPER GASTROINTESTINAL ENDOSCOPY N/A 9/22/2020    EGD WITH POLYPECTOMY AND DILATION performed by [facility]ero Green [facility] at [facility]       Family History   Problem Relation Age of Onset   Sho iY Crohn's Disease Sister     Diabetes Sister     Heart Disease Mother     Kidney Disease Mother     Heart Disease Father     Obesity Sister     Heart Disease Sister     Colon Cancer Neg Hx        CareTeam (Including outside providers/suppliers regularly involved in providing care):   Patient Care Team:  Brinda Coulter MD as PCP - General (Internal Medicine)  Brinda Coulter MD as PCP - [facility] Empaneled Provider  Sienna Haq MD as Cardiologist (Cardiology)    Wt Readings from Last 3 Encounters:   06/29/21 154 lb (69.9 kg)   06/01/21 154 lb (69.9 kg)   05/04/21 154 lb (69.9 kg)     There were no vitals filed for this visit.   There is no height or weight on file to calculate BMI. Based upon direct observation of the patient, evaluation of cognition reveals recent and remote memory intact. Patient's complete Health Risk Assessment and screening values have been reviewed and are found in Flowsheets. The following problems were reviewed today and where indicated follow up appointments were made and/or referrals ordered. Positive Risk Factor Screenings with Interventions:          General Health and ACP:  General  In general, how would you say your health is?: Good  In the past 7 days, have you experienced any of the following?  New or Increased Pain, New or Increased Fatigue, Loneliness, Social Isolation, Stress or Anger?: None of These  Do you get the social and emotional support that you need?: Yes  Do you have a Living Will?: Yes  Advance Directives     Power of [address] [place] [address] Will ACP-Advance Directive ACP-Power of     Not on File Not on File Not on File Not on File      General Health Risk Interventions:  · Poor self-assessment of health status: patient declines any further evaluation/treatment for this issue        Personalized Preventive Plan   Current Health Maintenance Status  Immunization History   Administered Date(s) Administered    COVID-19, Moderna, PF, 100mcg/0.5mL 03/27/2021, 04/24/2021    Influenza Virus Vaccine 09/28/2012, 09/28/2013, 10/04/2014    Influenza, High Dose (Fluzone 65 yrs and older) 10/22/2015, 11/12/2016, 10/03/2017, 10/09/2018, 11/02/2019    Influenza, High-dose, Quadv, 65 yrs +, IM (Fluzone) 09/27/2020    Pneumococcal Conjugate 13-valent ([id number]) 09/08/2015    Pneumococcal Polysaccharide ([id number]) 09/15/2010    Td, (Adult) Not Adsorbed 01/01/2010        Health Maintenance   Topic Date Due    Shingles Vaccine (1 of 2) Never done    DTaP/Tdap/Td vaccine (1 - Tdap) 01/02/2010    Lipid screen  09/21/2020    Annual Wellness Visit (AWV)  05/16/2021    Potassium monitoring  09/04/2021    Creatinine monitoring 09/04/2021    Colon cancer screen colonoscopy  09/22/2030    DEXA (modify frequency per FRAX score)  Completed    Flu vaccine  Completed    Pneumococcal 65+ years Vaccine  Completed    COVID-19 Vaccine  Completed    Hepatitis A vaccine  Aged Out    Hepatitis B vaccine  Aged Out    Hib vaccine  Aged Out    Meningococcal (ACWY) vaccine  Aged Out    Hepatitis C screen  Discontinued     Recommendations for [employer] Due: see orders and patient instructions/AVS.  . Recommended screening schedule for the next 5-10 years is provided to the patient in written form: see Patient Instructions/AVS.    IRadha LPN, 1/45/7159, performed the documented evaluation under the direct supervision of the attending physician. This encounter was performed under Mila frye MDs, direct supervision, 6/30/2021.

## 2021-07-06 DIAGNOSIS — E87.0 HYPERNATREMIA: ICD-10-CM

## 2021-07-06 LAB
ANION GAP SERPL CALCULATED.3IONS-SCNC: 13 MEQ/L (ref 9–15)
BUN BLDV-MCNC: 10 MG/DL (ref 8–23)
CALCIUM SERPL-MCNC: 9.9 MG/DL (ref 8.5–9.9)
CHLORIDE BLD-SCNC: 107 MEQ/L (ref 95–107)
CO2: 24 MEQ/L (ref 20–31)
CREAT SERPL-MCNC: 0.71 MG/DL (ref 0.5–0.9)
GFR AFRICAN AMERICAN: >60
GFR NON-AFRICAN AMERICAN: >60
GLUCOSE BLD-MCNC: 94 MG/DL (ref 70–99)
POTASSIUM SERPL-SCNC: 4.3 MEQ/L (ref 3.4–4.9)
SODIUM BLD-SCNC: 144 MEQ/L (ref 135–144)

## 2021-07-16 ENCOUNTER — TELEPHONE (OUTPATIENT)
Dept: FAMILY MEDICINE CLINIC | Age: 76
End: 2021-07-16

## 2021-07-16 NOTE — TELEPHONE ENCOUNTER
Patient called asking if you can call her. She wants to talk to you about a sacral problem and referring her to a specialist.  Patient did not want to elaborate on the issue. Her cell phone # is [phone]. Thank you.

## 2021-07-19 ENCOUNTER — HOSPITAL ENCOUNTER (OUTPATIENT)
Dept: PHYSICAL THERAPY | Age: 76
Setting detail: THERAPIES SERIES
Discharge: HOME | End: 2021-07-19
Payer: MEDICARE

## 2021-07-19 PROCEDURE — 97162 PT EVAL MOD COMPLEX 30 MIN: CPT

## 2021-07-19 ASSESSMENT — PAIN DESCRIPTION - LOCATION: LOCATION: SACRUM

## 2021-07-19 ASSESSMENT — PAIN SCALES - GENERAL: PAINLEVEL_OUTOF10: 10

## 2021-07-19 NOTE — PROGRESS NOTES
[address]  PHYSICAL THERAPY EVALUATION    Date: 2021  Patient Name: Vijay Conley       MRN: [id number]   Account: [de-identified]   : 1945  (64 y.o.)   Gender: female   Referring Practitioner: Dr. Iveth Koo                 Diagnosis: Gait instability  Treatment Diagnosis: Gait instability  Additional Pertinent Hx: OA, fibromyalgia, HTN, recent bladder stimulator placement             Past Medical History:  has a past medical history of Anticoagulant long-term use, Carotid artery stenosis, Family history of early CAD, Fibromyalgia, GERD (gastroesophageal reflux disease), Hypertension, Neuropathy, Sleep apnea, and 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 myelopathy or radiculopathy. Past Surgical History:   has a past surgical history that includes Cholecystectomy; Hysterectomy; Inner ear surgery; Bladder surgery; bladder suspension; Upper gastrointestinal endoscopy (N/A, 2020); and Colonoscopy (2020). Vital Signs  Patient Currently in Pain: Yes   Pain Screening  Patient Currently in Pain: Yes  Pain Assessment  Pain Assessment: 0-10  Pain Level: 10  Pain Location: Sacrum          Lives With: Spouse  Type of Home: House  Home Layout: Two level; Able to Live on Main level with bedroom/bathroom  Home Access: Stairs to enter with rails  Entrance Stairs - Number of Steps: 2  Entrance Stairs - Rails: Both  Home Equipment: Rolling walker  ADL Assistance: Independent  Homemaking Assistance: Independent  Ambulation Assistance: Independent  Transfer Assistance: Independent  Active : Yes  Occupation: Retired        Subjective:  Subjective: Reports losing her balance and cannot walk a straight line starting in April. Had bladder stimulator implanted on 21 which has affected her sacral nerves. Saw Dr. Gary Plaza due to freezing while climbing up and standing on a chair due to severe pain all over.   Reports had bloodwork done which showed per pt was there was no water in her blood - was instructed to drink plenty of water. Bladder stimulator is not working but unable to see doctor until September. Pt reports she is currently remodeling her house but feels she does not have the energy or strength to do anything. Is having problems with sinuses and Rt ear pain - sees Dr. Jess Vaca tomorrow. Was previously mowing the lawn and taking care of pets but now is unable or has difficulty since stimulator placed. Reports balance is bad and tends to wall walk at home. No falls in the past 6 months. Reports being off balance with standing still and walking. Objective:   Sensation  Overall Sensation Status: WNL    Balance  Comments: Duran = 36/56    Ambulation 1  Surface: carpet  Device: No Device  Assistance: Supervision  Quality of Gait: FWd flexed posture, decreased antonina heel strike, occasional shuffled gait  Gait Deviations: Decreased step length, Decreased step height, Decreased arm swing, Decreased head and trunk rotation, Slow Monique  Distance: 80'  Stairs  # Steps : 4  Stairs Height: 6\"  Rails: Bilateral  Assistance: Supervision, Independent  Comment: recip     Transfers  Sit to Stand: Independent  Stand to sit:  Independent  Comment: 5xSTS from mat without UEs = 12.46sec, from chair without UEs = 13.87sec    Strength RLE  Comment: Hip flex 4/5, knee ext/flex 5/5, DF 5/5, hip abd 3+/5  Strength LLE  Comment: Hip flex 4+/5, knee ext/flex 5/5, DF 5/5, hip abd 4-/5     Bed mobility  Rolling to Left: Independent  Rolling to Right: Independent  Supine to Sit: Independent  Sit to Supine: Independent    Timed Up and Go: 20.48 (without an AD)    Exercises:   Exercises  Exercise 1: Static standing*  Exercise 2: Foam*  Exercise 3: Single stepping with and without reach*  Exercise 4: Gait drills*  Exercise 5: Ball walk up on wall*  Exercise 6: Seated power ups*  Exercise 7: 2 way SLR*  Exercise 8: Bridges*  Exercise 9: Clams*  Exercise 10: Sink ex*  *Indicates exercise,modality, or manual techniques to be initiated when appropriate    Assessment: Body structures, Functions, Activity limitations: Decreased functional mobility , Decreased strength, Decreased balance, Decreased posture, Increased pain  Assessment: Pt presents with a sudden decline in her strength, balance and mobility after recently bladder stimulator placement. Pt demonstrates decreased strength in antonina LEs primarily in her hips. Pt is at an increased risk for falls per Leodis Saldaña and TUG. Pt ambulates without an AD but tends to reach for walls or hold onto therapist's arm for stability. She demonstrates decreased antonina foot clearance with a FWD flexed posture. Discussed possible temporary use of a s/c for stability but pt reports she would prefer not to use an AD. Pt would benefit from further skilled PT to improve her strength, balance and safety with all mobility. Prognosis: Good  Discharge Recommendations: Continue to assess pending progress  Activity Tolerance: Patient Tolerated treatment well     Decision Making: Medium Complexity  History: PMH: OA, fibromyalgia, HTN, recent bladder stimulator placement  Exam: Decreased strength and balance impacting mobility and safety. Clinical Presentation: Evolving        Plan  Frequency/Duration:  Plan  Times per week: 2  Plan weeks: 6  Current Treatment Recommendations: Strengthening, Balance Training, Functional Mobility Training, Transfer Training, Gait Training, Stair training, Neuromuscular Re-education, Manual Therapy - Soft Tissue Mobilization, Home Exercise Program, Safety Education & Training, Patient/Caregiver Education & Training, Equipment Evaluation, Education, & procurement, Modalities         Patient Education  New Education Provided: PT Education: Goals;PT Role;Plan of Care    POST-PAIN     Pain Rating (0-10 pain scale):   10/10  Location and pain description same as pre-treatment unless indicated.    Action: [] NA  [] Call Physician  [] Perform HEP  [x] Meds as prescribed    Evaluation and patient rights have been reviewed and patient agrees with plan of care. Yes  [x]  No  []   Explain:       [place] Fall Risk Assessment  Risk Factor Scale  Score   History of Falls [] Yes  [x] No 25  0 0   Secondary Diagnosis [] Yes  [x] No 15  0 0   Ambulatory Aid [] Furniture  [] Crutches/cane/walker  [x] None/bedrest/wheelchair/nurse 30  15  0 0   IV/Heparin Lock [] Yes  [x] No 20  0 0   Gait/Transferring [x] Impaired  [] Weak  [] Normal/bedrest/immobile 20  10  0 20   Mental Status [] Forgets limitations  [x] Oriented to own ability 15  0 0      Total:20     Based on the Assessment score: check the appropriate box. [x]  No intervention needed   Low =   Score of 0-24  []  Use standard prevention interventions Moderate =  Score of 24-44   [] Discuss fall prevention strategies   [] Indicate moderate falls risk on eval  []  Use high risk prevention interventions High = Score of 45 and higher   [] Discuss fall prevention strategies   [] Provide supervision during treatment time    Goals  Short term goals  Time Frame for Short term goals: 2 weeks  Short term goal 1: Independent with HEP  Long term goals  Time Frame for Long term goals : 6 weeks  Long term goal 1: Improve antonina LE strength to >/= 4+/5 to improve stability with standing and walking. Long term goal 2: Pt will ambulate without an AD >/= 200' with improved antonina foot clearance and stability S/I. Long term goal 3: Duran >/= 45/56 to reduce pt's risk for falls. Long term goal 4: TUG </= 12sec without an AD to improve safety with ambulation.          PT Individual Minutes  Time In: 5644  Time Out: 4321  Minutes: 38     Procedure Minutes: 45'         Electronically signed by Shashi Mccoy PT on 7/19/21 at 12:33 PM EDT

## 2021-07-19 NOTE — PROGRESS NOTES
Increased pain  Assessment: Pt presents with a sudden decline in her strength, balance and mobility after recently bladder stimulator placement. Pt demonstrates decreased strength in antonina LEs primarily in her hips. Pt is at an increased risk for falls per Charissa Lesch and TUG. Pt ambulates without an AD but tends to reach for walls or hold onto therapist's arm for stability. She demonstrates decreased antonina foot clearance with a FWD flexed posture. Discussed possible temporary use of a s/c for stability but pt reports she would prefer not to use an AD. Pt would benefit from further skilled PT to improve her strength, balance and safety with all mobility. Prognosis: Good  Discharge Recommendations: Continue to assess pending progress      PT Education: Goals;PT Role;Plan of Care    PLAN: [x] Evaluate and Treat  Frequency/Duration:  Plan  Times per week: 2  Plan weeks: 6  Current Treatment Recommendations: Strengthening, Balance Training, Functional Mobility Training, Transfer Training, Gait Training, Stair training, Neuromuscular Re-education, Manual Therapy - Soft Tissue Mobilization, Home Exercise Program, Safety Education & Training, Patient/Caregiver Education & Training, Equipment Evaluation, Education, & procurement, Modalities     Precautions: none                           Patient Status:[x] Continue/ Initiate plan of Care    [] Discharge PT. Recommend pt continue with HEP. [] Additional visits requested, Please re-certify for additional visits:          Signature: Electronically signed by Tutu Mauricio PT on 7/19/21 at 12:36 PM EDT      If you have any questions or concerns, please don't hesitate to call.   Thank you for your referral.

## 2021-07-20 ENCOUNTER — INITIAL CONSULT (OUTPATIENT)
Dept: ENT CLINIC | Age: 76
End: 2021-07-20
Payer: MEDICARE

## 2021-07-20 VITALS
SYSTOLIC BLOOD PRESSURE: 122 MMHG | BODY MASS INDEX: 25.49 KG/M2 | OXYGEN SATURATION: 98 % | HEART RATE: 89 BPM | TEMPERATURE: 97.1 F | HEIGHT: 65 IN | WEIGHT: 153 LBS | DIASTOLIC BLOOD PRESSURE: 82 MMHG

## 2021-07-20 DIAGNOSIS — H91.93 BILATERAL HEARING LOSS, UNSPECIFIED HEARING LOSS TYPE: Primary | ICD-10-CM

## 2021-07-20 PROCEDURE — 99204 OFFICE O/P NEW MOD 45 MIN: CPT | Performed by: OTOLARYNGOLOGY

## 2021-07-20 ASSESSMENT — ENCOUNTER SYMPTOMS
RHINORRHEA: 0
TROUBLE SWALLOWING: 0
SORE THROAT: 0
VOICE CHANGE: 0
SINUS PAIN: 0
SINUS PRESSURE: 0
FACIAL SWELLING: 0

## 2021-07-20 NOTE — PROGRESS NOTES
Subjective:      Patient ID: Kristie Capps is a 64 y.o. female who presents today for:  Chief Complaint   Patient presents with    Hearing Problem     h/o hearing aids    Dizziness     and off balance       HPI: This nice lady who is 64years old was referred due to diminished hearing acuity associated with disequilibrium patient claims that this has been going on for a while she wears 2 hearing aids for quite some time now and has not helped her with her hearing loss often associated with pain inside the ear in the right side denies any infection also complain of frequent tinnitus which varies in intensity denies any ear drainage denies having been exposed to excessive noise in the past continue to have ear fullness plugging and popping sensation saw Dr. Loki Boyle given some medication and subsequently referred to me for definitive diagnosis and treatment    Past Medical History:   Diagnosis Date    Anticoagulant long-term use     Carotid artery stenosis     Family history of early CAD 6/28/2016    Fibromyalgia     GERD (gastroesophageal reflux disease)     Hypertension     Neuropathy     Sleep apnea     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 myelopathy or radiculopathy      Past Surgical History:   Procedure Laterality Date    BLADDER SURGERY      stimulator implant    BLADDER SUSPENSION      CHOLECYSTECTOMY      COLONOSCOPY  9/22/2020    COLONOSCOPY WITH BIOPSY AND POLYPECTOMY performed [facility] [place] [facility] N/A 9/22/2020    EGD WITH POLYPECTOMY AND DILATION performed by Oleg Nava MD at [address] [place] [address] History     Socioeconomic History    Marital status:      Spouse name: Not on file    Number of children: Not on file    Years of education: Not on file    Highest education level: Not on file   Occupational History    Not on file   Tobacco Use    Smoking status: Never Smoker    Smokeless tobacco: Never Used   Vaping Use    Vaping Use: Never used   Substance and Sexual Activity    Alcohol use: No    Drug use: Never    Sexual activity: Not on file   Other Topics Concern    Not on file   Social History Narrative    Not on file     Social Determinants of Health     Financial Resource Strain: Low Risk     Difficulty of Paying Living Expenses: Not hard at all   Food Insecurity: No Food Insecurity    Worried About Running Out of Food in the Last Year: Never true    Patty of Food in the Last Year: Never true   Transportation Needs: No Transportation Needs    Lack of Transportation (Medical): No    Lack of Transportation (Non-Medical):  No   Physical Activity:     Days of Exercise per Week:     Minutes of Exercise per Session:    Stress:     Feeling of Stress :    Social Connections:     Frequency of Communication with Friends and Family:     Frequency of Social Gatherings with Friends and Family:     Attends Judaism Services:     Active Member of Clubs or Organizations:     Attends Club or Organization Meetings:     Marital Status:    Intimate Partner Violence:     Fear of Current or Ex-Partner:     Emotionally Abused:     Physically Abused:     Sexually Abused:      Family History   Problem Relation Age of Onset    Crohn's Disease Sister     Diabetes Sister     Heart Disease Mother     Kidney Disease Mother     Heart Disease Father     Obesity Sister     Heart Disease Sister     Colon Cancer Neg Hx      Allergies   Allergen Reactions    Latex     Amoxicillin-Pot Clavulanate      Other reaction(s): Vomiting    Carbidopa-Levodopa      Other reaction(s): Unknown  Effects the eyes    Iv [Iodides]     Morphine Other (See Comments)     Made her go 'berserk '    Nitrofurantoin Other (See Comments)     Myalgia    Plavix [Clopidogrel Bisulfate]     Pravastatin Other (See Comments)     Myalgia         Review of Systems   HENT: Positive for ear pain, hearing loss (Purely sensory in character) and tinnitus (Intermittent in character). Negative for congestion, dental problem, drooling, ear discharge, facial swelling, mouth sores, nosebleeds, postnasal drip, rhinorrhea, sinus pressure, sinus pain, sneezing, sore throat, trouble swallowing and voice change. All other systems reviewed and are negative.       Objective:   /82 (Site: Right Upper Arm, Position: Sitting, Cuff Size: Large Adult)   Pulse 89   Temp 97.1 °F (36.2 °C) (Temporal)   Ht 5' 5\" (1.651 m)   Wt 153 lb (69.4 kg)   SpO2 98%   BMI 25.46 kg/m²     Physical Exam     Head and neck: Normocephalic no nuchal rigidity no palpable mass no venous engorgement no lymphadenopathy pain elicited at both preauricular area on pressure more accentuated in the right side    Ears: Canals are patent no tympanic membrane perforation mobility is good no air-fluid level noted in the middle ear    Mouth and throat: No extrinsic lesion noted    Assessment:      #1 sensorineural hearing loss bilateral has been wearing 2 hearing aids in the past  #2 concomitant tinnitus bilaterally fluctuating in character  #3  Active temporomandibular joint secondary to existing generalized joint disorders  #4 possible benign positional paroxysmal vertigo   Plan:      Schedule for comprehensive audiometric evaluation  Meloxicam 7.5 mg p.o. daily  Meclizine 12.5 mg at  8 AM repeat every 4 hours as needed  Return to office after hearing test for evaluation        Fernando Guzmán MD

## 2021-07-21 ENCOUNTER — HOSPITAL ENCOUNTER (OUTPATIENT)
Dept: PHYSICAL THERAPY | Age: 76
Setting detail: THERAPIES SERIES
Discharge: HOME | End: 2021-07-21
Payer: MEDICARE

## 2021-07-21 PROCEDURE — 97110 THERAPEUTIC EXERCISES: CPT

## 2021-07-21 PROCEDURE — 97112 NEUROMUSCULAR REEDUCATION: CPT

## 2021-07-21 RX ORDER — MELOXICAM 7.5 MG/1
7.5 TABLET ORAL DAILY
Qty: 30 TABLET | Refills: 3 | Status: SHIPPED | OUTPATIENT
Start: 2021-07-21 | End: 2022-01-19

## 2021-07-21 RX ORDER — MECLIZINE HCL 12.5 MG/1
12.5 TABLET ORAL DAILY
Qty: 60 TABLET | Refills: 0 | Status: SHIPPED | OUTPATIENT
Start: 2021-07-21 | End: 2021-09-19

## 2021-07-21 ASSESSMENT — PAIN SCALES - GENERAL: PAINLEVEL_OUTOF10: 5

## 2021-07-21 ASSESSMENT — PAIN DESCRIPTION - LOCATION: LOCATION: SACRUM

## 2021-07-21 NOTE — PROGRESS NOTES
[address] [address]  Outpatient Physical Therapy    Treatment Note        Date: 2021  Patient: Luz Pro  : 1945  ACCT #: [de-identified]  Referring Practitioner: Dr. Aleena Acevedo  Diagnosis: Gait instability  Treatment Diagnosis: Gait instability    Visit Information:  PT Visit Information  PT Insurance Information: [employer]  Total # of Visits Approved: 4 ($20 copay)  Total # of Visits to Date: 1  Plan of Care/Certification Expiration Date: 21  No Show: 0  Canceled Appointment: 0  Progress Note Counter:     Subjective: Reports seeing the ENT who gave her some medication and referred her to get a hearing test done. HEP Compliance:  [x] Good [] Fair [] Poor [] Reports not doing due to:    Vital Signs  Patient Currently in Pain: Yes   Pain Screening  Patient Currently in Pain: Yes  Pain Assessment  Pain Assessment: 0-10  Pain Level: 5  Pain Location: Sacrum    OBJECTIVE:   Exercises  Exercise 1: Static standing: FA, FT, semitandem  Exercise 2: Foam: FA EO, FT, FA with head turns, wt shifts  Exercise 3: Single stepping over s/c x10 antonina with 1 UE support  Exercise 4: Gait drills: /L//march x2 laps ea  Exercise 7: 2 way SLR x10 antonina  Exercise 8: Bridges x10  Exercise 9: Clams x10 antonina  Exercise 20: HEP: 2 way SLR, bridges, clams, static standing      Assessment:   Activity Tolerance  Activity Tolerance: Patient Tolerated treatment well    Body structures, Functions, Activity limitations: Decreased functional mobility , Decreased strength, Decreased balance, Decreased posture, Increased pain  Assessment: Initited skilled PT to improve pt's strength and balance. Pt reports increased antonina hip pain with antonina SLR possibly due to some impingement. Pt requires vc's to improve upright posture with standing exercises. Pt challenged with foam standing with occasional UE support to maintain balance. Requires multiple short seated RBs throughout session due to fatigue.   Treatment Diagnosis: Gait instability  Prognosis: Good       Goals:  Short term goals  Time Frame for Short term goals: 2 weeks  Short term goal 1: Independent with HEP    Long term goals  Time Frame for Long term goals : 6 weeks  Long term goal 1: Improve antonina LE strength to >/= 4+/5 to improve stability with standing and walking. Long term goal 2: Pt will ambulate without an AD >/= 200' with improved antonina foot clearance and stability S/I. Long term goal 3: Duran >/= 45/56 to reduce pt's risk for falls. Long term goal 4: TUG </= 12sec without an AD to improve safety with ambulation. Progress toward goals:strength, balance    POST-PAIN       Pain Rating (0-10 pain scale):  0 /10   Location and pain description same as pre-treatment unless indicated. Action: [x] NA   [] Perform HEP  [] Meds as prescribed  [] Modalities as prescribed   [] Call Physician     Frequency/Duration:  Plan  Times per week: 2  Plan weeks: 6  Current Treatment Recommendations: Strengthening, Balance Training, Functional Mobility Training, Transfer Training, Gait Training, Stair training, Neuromuscular Re-education, Manual Therapy - Soft Tissue Mobilization, Home Exercise Program, Safety Education & Training, Patient/Caregiver Education & Training, Equipment Evaluation, Education, & procurement, Modalities     Pt to continue current HEP. See objective section for any therapeutic exercise changes, additions or modifications this date.     PT Individual Minutes  Time In: 2215  Time Out: 2293  Minutes: 55  Timed Code Treatment Minutes: 54 Minutes  Procedure Minutes:0     Timed Activity Minutes Units   Ther Ex 24 2   Neuro laurita 30 2       Signature:  Electronically signed by Aparna Gomes PT on 7/21/21 at 4:13 PM EDT

## 2021-07-23 ENCOUNTER — TELEPHONE (OUTPATIENT)
Dept: ENT CLINIC | Age: 76
End: 2021-07-23

## 2021-07-23 NOTE — TELEPHONE ENCOUNTER
Incoming Call    Caller:  Alphonse Leblanc    Communication (HIPPA):  HIPPA not applicable    Notes:  Patient is calling in stating that she took the Mobic and Antivert that Dr. Mylo Leyden sent yesterday. Riki Just states that she was so dizzy that she could barely walk and that she was in a lot of pain. Patient is requesting that her medications be reviewed.      Please Advise

## 2021-07-27 ENCOUNTER — HOSPITAL ENCOUNTER (OUTPATIENT)
Dept: PHYSICAL THERAPY | Age: 76
Setting detail: THERAPIES SERIES
Discharge: HOME | End: 2021-07-27
Payer: MEDICARE

## 2021-07-27 NOTE — PROGRESS NOTES
Therapy                            Cancellation/No-show Note      Date:  2021  Patient Name:  Bridgette Menezes  :  1945   MRN:  [id number]  Referring Practitioner: Dr. Mignon Holloway  Diagnosis: Gait instability    Visit Information:  PT Visit Information  PT Insurance Information: [facility] [employer] Medicare  Total # of Visits Approved: 4 ($20 copay)  Total # of Visits to Date: 1  Plan of Care/Certification Expiration Date: 21  No Show: 0  Canceled Appointment: 1  Progress Note Counter:  CX 21-sick    For today's appointment patient:  [x]  Cancelled  []  Rescheduled appointment  []  No-show   []  Called pt to remind of next appointment     Reason given by patient:  [x]  Patient ill  []  Conflicting appointment  []  No transportation    []  Conflict with work  []  No reason given  []  Other:      [x] Pt has future appointments scheduled, no follow up needed  [] Pt requests to be on hold.     Reason:   If > 2 weeks please discuss with therapist.  [] Therapist to call pt for follow up     Signature: Electronically signed by Janes Miguel PTA on 21 at 8:50 AM EDT

## 2021-08-03 ENCOUNTER — OFFICE VISIT (OUTPATIENT)
Dept: ENT CLINIC | Age: 76
End: 2021-08-03
Payer: MEDICARE

## 2021-08-03 VITALS
TEMPERATURE: 97.1 F | BODY MASS INDEX: 25.49 KG/M2 | WEIGHT: 153 LBS | OXYGEN SATURATION: 98 % | HEIGHT: 65 IN | HEART RATE: 68 BPM

## 2021-08-03 DIAGNOSIS — R42 DIZZINESS OF UNKNOWN ETIOLOGY: ICD-10-CM

## 2021-08-03 DIAGNOSIS — H90.3 SENSORINEURAL HEARING LOSS (SNHL) OF BOTH EARS: Primary | ICD-10-CM

## 2021-08-03 PROCEDURE — 99213 OFFICE O/P EST LOW 20 MIN: CPT | Performed by: OTOLARYNGOLOGY

## 2021-08-03 ASSESSMENT — ENCOUNTER SYMPTOMS
RHINORRHEA: 0
SINUS PRESSURE: 0
TROUBLE SWALLOWING: 0
VOICE CHANGE: 0
SORE THROAT: 0
FACIAL SWELLING: 0
SINUS PAIN: 0
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
## 2021-08-05 ENCOUNTER — HOSPITAL ENCOUNTER (OUTPATIENT)
Dept: PHYSICAL THERAPY | Age: 76
Setting detail: THERAPIES SERIES
Discharge: HOME | End: 2021-08-05
Payer: MEDICARE

## 2021-08-05 PROCEDURE — 97112 NEUROMUSCULAR REEDUCATION: CPT

## 2021-08-05 PROCEDURE — 97110 THERAPEUTIC EXERCISES: CPT

## 2021-08-05 ASSESSMENT — PAIN DESCRIPTION - PAIN TYPE: TYPE: CHRONIC PAIN

## 2021-08-05 ASSESSMENT — PAIN DESCRIPTION - DESCRIPTORS: DESCRIPTORS: ACHING

## 2021-08-05 ASSESSMENT — PAIN SCALES - GENERAL: PAINLEVEL_OUTOF10: 7

## 2021-08-05 NOTE — PROGRESS NOTES
[address] [address]  Outpatient Physical Therapy    Treatment Note        Date: 2021  Patient: Katie Leroy  : 1945  ACCT #: [de-identified]  Referring Practitioner: Dr. Jing Thompson  Diagnosis: Gait instability  Treatment Diagnosis: Gait instability    Visit Information:  PT Visit Information  PT Insurance Information: [employer]  Total # of Visits Approved: 4 ($20 copay)  Total # of Visits to Date: 3  Plan of Care/Certification Expiration Date: 21  No Show: 0  Canceled Appointment: 1  Progress Note Counter:     Subjective: Pt reports that she has been having dry eyes and were inflammed and got medication for them yesterday and has started to help. Pt reports that her balance has gotten worse since her last visit. She states that she uses whatever objects are around to stabalize her (ie, cars, walls, furniture).      HEP Compliance:  [x] Good [] Fair [] Poor [] Reports not doing due to:    Vital Signs  Patient Currently in Pain: Yes   Pain Screening  Patient Currently in Pain: Yes  Pain Assessment  Pain Assessment: 0-10  Pain Level: 7  Pain Type: Chronic pain  Pain Location:  (Pt states pain is all over)  Pain Descriptors: Aching    OBJECTIVE:   Exercises  Exercise 1: Static standing: FT, FA with head turns and chin tilts  Exercise 2: Foam: FA with shoulder flexion/extension x10 each  Exercise 3: Single stepping over s/c F/R x10 antonina with 1 UE support  Exercise 4: Gait drills: F/L/R/march x3 laps ea  Exercise 5: Ball walk up on wall 5 sec x 10  Exercise 6: Seated power ups x10  Exercise 7: 2 way SLR x10 antonina  Exercise 8: Bridges x10  Exercise 9: Clams x10 antonina  Exercise 10: Seated DF/PF, hip flexion, LAQ, hip AB YTB, HS curl YTB, and hip AD with pillow  Exercise 20: HEP: power ups    Bed mobility  Rolling to Left: Independent  Rolling to Right: Independent  Supine to Sit: Independent  Sit to Supine: Independent    *Indicates exercise, modality, or manual techniques to be initiated when appropriate    Assessment: Body structures, Functions, Activity limitations: Decreased functional mobility , Decreased strength, Decreased balance, Decreased posture, Increased pain  Assessment: Continued therex and balance activities per PT POC with good tolerance. Pt denies increased pain with activities. Cues for increased ROM with ball roll up wall to improve upright posture. Treatment Diagnosis: Gait instability        Goals:  Short term goals  Time Frame for Short term goals: 2 weeks  Short term goal 1: Independent with HEP    Long term goals  Time Frame for Long term goals : 6 weeks  Long term goal 1: Improve antonina LE strength to >/= 4+/5 to improve stability with standing and walking. Long term goal 2: Pt will ambulate without an AD >/= 200' with improved antonina foot clearance and stability S/I. Long term goal 3: Duran >/= 45/56 to reduce pt's risk for falls. Long term goal 4: TUG </= 12sec without an AD to improve safety with ambulation. Progress toward goals: Progressing towards all    POST-PAIN       Pain Rating (0-10 pain scale):   0/10   Location and pain description same as pre-treatment unless indicated. Action: [] NA   [x] Perform HEP  [] Meds as prescribed  [] Modalities as prescribed   [] Call Physician     Frequency/Duration:  Plan  Times per week: 2  Plan weeks: 6  Current Treatment Recommendations: Strengthening, Balance Training, Functional Mobility Training, Transfer Training, Gait Training, Stair training, Neuromuscular Re-education, Manual Therapy - Soft Tissue Mobilization, Home Exercise Program, Safety Education & Training, Patient/Caregiver Education & Training, Equipment Evaluation, Education, & procurement, Modalities     Pt to continue current HEP. See objective section for any therapeutic exercise changes, additions or modifications this date.          PT Individual Minutes  Time In: 1310  Time Out: 1408  Minutes: 58  Timed Code Treatment Minutes: 58 Minutes  Procedure Minutes: 0 Timed Activity Minutes Units   Ther Ex 43 3   Neuro 15 1       Signature:  Electronically signed by Roxi Edouard PTA on 8/5/21 at 2:11 PM EDT  Electronically signed by Yesy Hidalgo PTA on 8/5/2021 at 2:25 PM

## 2021-08-11 ENCOUNTER — HOSPITAL ENCOUNTER (OUTPATIENT)
Dept: PHYSICAL THERAPY | Age: 76
Setting detail: THERAPIES SERIES
Discharge: HOME | End: 2021-08-11
Payer: MEDICARE

## 2021-08-11 PROCEDURE — 97112 NEUROMUSCULAR REEDUCATION: CPT

## 2021-08-11 PROCEDURE — 97116 GAIT TRAINING THERAPY: CPT

## 2021-08-11 NOTE — PROGRESS NOTES
[address] [address]  Outpatient Physical Therapy    Treatment Note        Date: 2021  Patient: Francia Flores  : 1945  ACCT #: [de-identified]  Referring Practitioner: Dr. Dana Xiong  Diagnosis: Gait instability  Treatment Diagnosis: Gait instability    Visit Information:  PT Visit Information  PT Insurance Information: [employer]  Total # of Visits Approved: 4 ($20 copay)  Total # of Visits to Date: 3  Plan of Care/Certification Expiration Date: 21  No Show: 0  Canceled Appointment: 1  Progress Note Counter: 3/    Subjective: Medication has somewhat made a difference but still has a week and a half to go. Has an appointment with a neurologist on 9/15/21.      HEP Compliance:  [x] Good [] Fair [] Poor [] Reports not doing due to:    Vital Signs  Patient Currently in Pain: Yes   Pain Screening  Patient Currently in Pain: Yes    OBJECTIVE:   Exercises  Exercise 3: Single stepping over s/c F/L x10 antonina with 1 UE support  Exercise 4: Gait drills: F focusing on posture and heel strike/cross crawls  Exercise 5: Ball walk up on wall 5 sec x 10  Exercise 10: Standing heel/toe raises with fingertip support  Exercise 11: Standing against wall with yellow Pball lifts x5  Exercise 12: Standing scap retractions x10  Exercise 20: HEP: ball walk up on wall, scap retractions, heel/toe raises, single stepping    Balance  Comments: Duran = 42/56, DGI =     Ambulation 1  Surface: carpet  Device: No Device  Assistance: Supervision  Quality of Gait: Vc's to improve posture and arm swing with good carryover and improved stability  Gait Deviations: Slow Monique, Decreased step length, Decreased step height, Decreased arm swing  Distance: 200'    Strength: [] NT  [x] MMT completed:  Strength RLE  Comment: Hip flex 4/5, knee ext/flex 5/5, DF 5/5, hip abd 4-/5  Strength LLE  Comment: Hip flex 4+/5, knee ext/flex 5/5, DF 5/5, hip abd 4/5    Assessment:   Activity Tolerance  Activity Tolerance: Patient Tolerated treatment well    Body structures, Functions, Activity limitations: Decreased functional mobility , Decreased strength, Decreased balance, Decreased posture, Increased pain  Assessment: Pt with significant improved static standing balance as seen with 6 point improvement in Duran score but continues to be at an increased risk for falls. Assessed DGI with scoring also indicating pt is at risk for falls. Pt demonstrates some improvement in antonina LE strength. Pt tends to ambulate with unsteadiness, a low guard and decreased antonina foot clearance and heel strike. Pt with some improvement in gait quality when focused on upright posture and antonina arm swing. Pt would benefit from further skilled PT to improve pt's strength, balance and safety with all mobility. Treatment Diagnosis: Gait instability  Prognosis: Good       Goals:  Short term goals  Time Frame for Short term goals: 2 weeks  Short term goal 1: Independent with HEP    Long term goals  Time Frame for Long term goals : 6 weeks  Long term goal 1: Improve antonina LE strength to >/= 4+/5 to improve stability with standing and walking. Long term goal 2: Pt will ambulate without an AD >/= 200' with improved antoinna foot clearance and stability S/I. Long term goal 3: Duran >/= 45/56 to reduce pt's risk for falls. Long term goal 4: TUG </= 12sec without an AD to improve safety with ambulation. Progress toward goals: see progress note    POST-PAIN       Pain Rating (0-10 pain scale):  0 /10   Location and pain description same as pre-treatment unless indicated.    Action: [x] NA   [] Perform HEP  [] Meds as prescribed  [] Modalities as prescribed   [] Call Physician     Frequency/Duration:  Plan  Times per week: 2  Plan weeks: 6  Current Treatment Recommendations: Strengthening, Balance Training, Functional Mobility Training, Transfer Training, Gait Training, Stair training, Neuromuscular Re-education, Manual Therapy - Soft Tissue Mobilization, Home Exercise Program, Safety Education & Training, Patient/Caregiver Education & Training, Equipment Evaluation, Education, & procurement, Modalities     Pt to continue current HEP. See objective section for any therapeutic exercise changes, additions or modifications this date.     PT Individual Minutes  Time In: 0734  Time Out: 9404  Minutes: 55  Timed Code Treatment Minutes: 54 Minutes  Procedure Minutes:0     Timed Activity Minutes Units   Neuro laurita 44 3   Gait 10 1       Signature:  Electronically signed by Kenan Min PT on 8/11/21 at 12:56 PM EDT

## 2021-08-11 NOTE — PROGRESS NOTES
Body structures, Functions, Activity limitations: Decreased functional mobility , Decreased strength, Decreased balance, Decreased posture, Increased pain  Assessment: Pt with significant improved static standing balance as seen with 6 point improvement in Duran score but continues to be at an increased risk for falls. Assessed DGI with scoring also indicating pt is at risk for falls. Pt demonstrates some improvement in antonina LE strength. Pt tends to ambulate with unsteadiness, a low guard and decreased antonina foot clearance and heel strike. Pt with some improvement in gait quality when focused on upright posture and antonina arm swing. Pt would benefit from further skilled PT to improve pt's strength, balance and safety with all mobility. Prognosis: Good  Discharge Recommendations: Continue to assess pending progress      PLAN: [] Evaluate and Treat  Frequency/Duration:  Plan  Times per week: 2  Plan weeks: 6  Current Treatment Recommendations: Strengthening, Balance Training, Functional Mobility Training, Transfer Training, Gait Training, Stair training, Neuromuscular Re-education, Manual Therapy - Soft Tissue Mobilization, Home Exercise Program, Safety Education & Training, Patient/Caregiver Education & Training, Equipment Evaluation, Education, & procurement, Modalities     Precautions:       falls                     Patient Status:[x] Continue/ Initiate plan of Care    [] Discharge PT. Recommend pt continue with HEP. [] Additional visits requested, Please re-certify for additional visits:          Signature: Electronically signed by Brandt Kim PT on 8/11/21 at 1:15 PM EDT      If you have any questions or concerns, please don't hesitate to call.   Thank you for your referral.

## 2021-08-18 ENCOUNTER — HOSPITAL ENCOUNTER (OUTPATIENT)
Dept: PHYSICAL THERAPY | Age: 76
Setting detail: THERAPIES SERIES
Discharge: HOME | End: 2021-08-18
Payer: MEDICARE

## 2021-08-18 PROCEDURE — 97116 GAIT TRAINING THERAPY: CPT

## 2021-08-18 PROCEDURE — 97110 THERAPEUTIC EXERCISES: CPT

## 2021-08-18 ASSESSMENT — PAIN DESCRIPTION - DESCRIPTORS: DESCRIPTORS: HEADACHE;CONSTANT

## 2021-08-18 NOTE — PROGRESS NOTES
[address] [address]  Outpatient Physical Therapy    Treatment Note        Date: 2021  Patient: Jose Crowley  : 1945  ACCT #: [de-identified]  Referring Practitioner: Dr. Carmela Mullins  Diagnosis: Gait instability  Treatment Diagnosis: Gait instability    Visit Information:  PT Visit Information  PT Insurance Information: [employer]  Total # of Visits Approved: 4 ($20 copay)  Total # of Visits to Date: 4  Plan of Care/Certification Expiration Date: 21  No Show: 0  Canceled Appointment: 1  Progress Note Counter:     Subjective: \"I'm having a bad day with my balance today. \" Denies dizziness, though states just feeling off. Pt later admitted to only eating marshmallows for breakfast.     HEP Compliance:  [x] Good [] Fair [] Poor [] Reports not doing due to:    Vital Signs  Patient Currently in Pain: Yes   Pain Screening  Patient Currently in Pain: Yes  Pain Assessment  Pain Assessment: 0-10  Patient's Stated Pain Goal: 6  Pain Descriptors: Headache;Constant    OBJECTIVE:   Exercises  Exercise 3: Single stepping over s/c F/L x10 antonina with 1 UE support  Exercise 4: Gait drills: F/L/R/marching with 0-1 UE support x2 laps ea in // bars  Exercise 5: Ball walk up on wall 5 sec x 10  Exercise 6: Wall slides with lift off 5 sec x 10  Exercise 7: 2 way SLR x10 antonina  Exercise 8: Bridges 5 sec x 10  Exercise 9: Clams 5 sec x 10 antonina    Ambulation 1  Surface: carpet  Device: Single point cane  Assistance: Supervision  Quality of Gait: Initial difficulty with sequencing though improved with practice, occasional lateral instability in straight path but mostly with turns  Distance: >200 ft from clinic to parking lot    *Indicates exercise, modality, or manual techniques to be initiated when appropriate    Assessment:    Body structures, Functions, Activity limitations: Decreased functional mobility , Decreased strength, Decreased balance, Decreased posture, Increased pain  Assessment: /79 in sitting prior to tx. Discussed more nutritious breakfast prior to tx, as pt reports eating only marshmallows today. Trialed ambulation with str cane this date d/t pt self reporting decreased balance this date and reaching for walls ambulating into clinic. Discussed pt keeping str cane in car for at least community distances, as pt report using other cars for support in store parking lots or trying to park near carts for support. Treatment Diagnosis: Gait instability        Goals:  Short term goals  Time Frame for Short term goals: 2 weeks  Short term goal 1: Independent with HEP    Long term goals  Time Frame for Long term goals : 6 weeks  Long term goal 1: Improve antonina LE strength to >/= 4+/5 to improve stability with standing and walking. Long term goal 2: Pt will ambulate without an AD >/= 200' with improved antonina foot clearance and stability S/I. Long term goal 3: Duran >/= 45/56 to reduce pt's risk for falls. Long term goal 4: TUG </= 12sec without an AD to improve safety with ambulation. Progress toward goals: Progressing towards all    POST-PAIN       Pain Rating (0-10 pain scale):   0/10   Location and pain description same as pre-treatment unless indicated. Action: [] NA   [x] Perform HEP  [] Meds as prescribed  [] Modalities as prescribed   [] Call Physician     Frequency/Duration:  Plan  Times per week: 2  Plan weeks: 6  Current Treatment Recommendations: Strengthening, Balance Training, Functional Mobility Training, Transfer Training, Gait Training, Stair training, Neuromuscular Re-education, Manual Therapy - Soft Tissue Mobilization, Home Exercise Program, Safety Education & Training, Patient/Caregiver Education & Training, Equipment Evaluation, Education, & procurement, Modalities     Pt to continue current HEP. See objective section for any therapeutic exercise changes, additions or modifications this date.          PT Individual Minutes  Time In: 4834  Time Out: 1103  Minutes: 61  Timed Code Treatment Minutes: 61 Minutes  Procedure Minutes: 0     Timed Activity Minutes Units   Ther Ex 36 2   Gait 25 2       Signature:  Electronically signed by Geoff Hudson PTA on 8/18/21 at 11:15 AM EDT

## 2021-08-25 ENCOUNTER — HOSPITAL ENCOUNTER (OUTPATIENT)
Dept: PHYSICAL THERAPY | Age: 76
Setting detail: THERAPIES SERIES
Discharge: HOME | End: 2021-08-25
Payer: MEDICARE

## 2021-08-25 PROCEDURE — 97112 NEUROMUSCULAR REEDUCATION: CPT

## 2021-08-25 PROCEDURE — 97110 THERAPEUTIC EXERCISES: CPT

## 2021-08-25 ASSESSMENT — PAIN SCALES - GENERAL: PAINLEVEL_OUTOF10: 5

## 2021-08-25 ASSESSMENT — PAIN DESCRIPTION - DESCRIPTORS: DESCRIPTORS: ACHING

## 2021-08-25 ASSESSMENT — PAIN DESCRIPTION - LOCATION: LOCATION: HEAD

## 2021-09-01 ENCOUNTER — HOSPITAL ENCOUNTER (OUTPATIENT)
Dept: PHYSICAL THERAPY | Age: 76
Setting detail: THERAPIES SERIES
Discharge: HOME | End: 2021-09-01
Payer: MEDICARE

## 2021-09-01 PROCEDURE — 97116 GAIT TRAINING THERAPY: CPT

## 2021-09-01 PROCEDURE — 97110 THERAPEUTIC EXERCISES: CPT

## 2021-09-01 PROCEDURE — 97112 NEUROMUSCULAR REEDUCATION: CPT

## 2021-09-01 ASSESSMENT — PAIN SCALES - GENERAL: PAINLEVEL_OUTOF10: 6

## 2021-09-01 ASSESSMENT — PAIN DESCRIPTION - LOCATION: LOCATION: NECK

## 2021-09-01 ASSESSMENT — PAIN DESCRIPTION - DESCRIPTORS: DESCRIPTORS: ACHING

## 2021-09-01 NOTE — PROGRESS NOTES
cues for 2 step turn with improved foot clearance. Utikized gait ladder this date to facilitate increased step length with fair tolerance. Improving ankle strategies noted with foam exercises. Treatment Diagnosis: Gait instability        Goals:  Short term goals  Time Frame for Short term goals: 2 weeks  Short term goal 1: Independent with HEP    Long term goals  Time Frame for Long term goals : 6 weeks  Long term goal 1: Improve antonina LE strength to >/= 4+/5 to improve stability with standing and walking. Long term goal 2: Pt will ambulate without an AD >/= 200' with improved antonina foot clearance and stability S/I. Long term goal 3: Duran >/= 45/56 to reduce pt's risk for falls. Long term goal 4: TUG </= 12sec without an AD to improve safety with ambulation. Progress toward goals: Progressing towards all    POST-PAIN       Pain Rating (0-10 pain scale):   0/10   Location and pain description same as pre-treatment unless indicated. Action: [] NA   [x] Perform HEP  [] Meds as prescribed  [] Modalities as prescribed   [] Call Physician     Frequency/Duration:  Plan  Times per week: 2  Plan weeks: 6  Specific instructions for Next Treatment: POC NV  Current Treatment Recommendations: Strengthening, Balance Training, Functional Mobility Training, Transfer Training, Gait Training, Stair training, Neuromuscular Re-education, Manual Therapy - Soft Tissue Mobilization, Home Exercise Program, Safety Education & Training, Patient/Caregiver Education & Training, Equipment Evaluation, Education, & procurement, Modalities     Pt to continue current HEP. See objective section for any therapeutic exercise changes, additions or modifications this date.          PT Individual Minutes  Time In: 1003  Time Out: 1058  Minutes: 55  Timed Code Treatment Minutes: 55 Minutes  Procedure Minutes: 0     Timed Activity Minutes Units   Ther Ex 25 2   Gait 10 1   Neuro 20 1       Signature:  Electronically signed by Lynn Polanco PTA on 9/1/21 at 12:10 PM EDT

## 2021-09-08 ENCOUNTER — HOSPITAL ENCOUNTER (OUTPATIENT)
Dept: PHYSICAL THERAPY | Age: 76
Setting detail: THERAPIES SERIES
Discharge: HOME | End: 2021-09-08
Payer: MEDICARE

## 2021-09-08 NOTE — PROGRESS NOTES
[address]       Physical Therapy  Cancellation/No-show Note  Patient Name:  Montez Menon  :  1945   Date:  2021  Referring Practitioner: Dr. Efrem Valdez  Diagnosis: Gait instability    Visit Information:  PT Visit [address]: [employer]  Total # of Visits Approved: 4 ($20 copay)  Total # of Visits to Date: 3  Plan of Care/Certification Expiration Date: 21  No Show: 0  Canceled Appointment: 2  Progress Note Counter: /4 (4 visits 21 to 21) Cx 21    For today's appointment patient:  [x]  Cancelled  []  Rescheduled appointment  []  No-show   []  Called pt to remind of next appointment     Reason given by patient:  [x]  Patient ill- headache  []  Conflicting appointment  []  No transportation    []  Conflict with work  []  No reason given  []  Other:       Comments:       Signature: Electronically signed by Maria Ines Red PTA on 21 at 10:52 AM EDT

## 2021-09-24 ENCOUNTER — HOSPITAL ENCOUNTER (OUTPATIENT)
Dept: PHYSICAL THERAPY | Age: 76
Setting detail: THERAPIES SERIES
Discharge: HOME | End: 2021-09-24
Payer: MEDICARE

## 2021-10-01 NOTE — DISCHARGE SUMMARY
[address]     Ph: [phone]  Fax: [phone]    [] Certification  [] Recertification []  Plan of Care  [] Progress Note [x] Discharge      To:  Dr. Leslie Hale      From: Kasey Ortiz PT DPT  Patient: Faith Russell     : 1945  Diagnosis: Gait instability     Date: 10/1/2021  Treatment Diagnosis: Gait instability    Plan of Care/Certification Expiration Date: 21  Progress Report Period from:  2021  to 10/1/2021    Total # of Visits to Date: 3   No Show: 0    Canceled Appointment: 1     OBJECTIVE:   Short Term Goals - Time Frame for Short term goals: 2 weeks    Goals Current/Discharge status  Met   Short term goal 1: Independent with HEP  ongoing [x] yes  [] no     Long Term Goals - Time Frame for Long term goals : 6 weeks  Goals Current/ Discharge status Met   Long term goal 1: Improve antonina LE strength to >/= 4+/5 to improve stability with standing and walking. Strength RLE: 2021  Comment: Hip flex 4/5, knee ext/flex 5/5, DF 5/5, hip abd 4-/5  Strength LLE  Comment: Hip flex 4+/5, knee ext/flex 5/5, DF 5/5, hip abd 4/5 [] yes  [x] no   Long term goal 2: Pt will ambulate without an AD >/= 200' with improved antonina foot clearance and stability S/I. Ambulation 1: 2021  Surface: carpet  Device: No Device  Assistance: Modified Independent  Quality of Gait: Mildly increased lateral sway, rigid trunk, forward head with downward gaze though improving, mild instability with turns  Distance: 250 ft [] yes  [x] no   Long term goal 3: Duran >/= 45/56 to reduce pt's risk for falls. Duran Balance Score: 42 [] yes  [x] no   Long term goal 4: TUG </= 12sec without an AD to improve safety with ambulation.  Dynamic Gait Total Score: 12 [] yes  [x] no         Body structures, Functions, Activity limitations: Decreased functional mobility , Decreased strength, Decreased balance, Decreased posture, Increased pain      Assessment: Pt has not been seen since 9/1/2021 and is requesting discharge at this time. PLAN: [] Evaluate and Treat  Frequency/Duration:  D/C to HEP    Precautions:   Falls Risk                         Patient Status:[] Continue/ Initiate plan of Care    [x] Discharge PT. Recommend pt continue with HEP. [] Additional visits requested, Please re-certify for additional visits:          Signature: Electronically signed by Gomez Rendon PTA on 10/1/21 at 3:50 PM EDT  Electronically signed by Jesusita Gary PT on 10/26/2021 at 9:41 AM    If you have any questions or concerns, please don't hesitate to call.   Thank you for your referral.

## 2021-10-29 ENCOUNTER — OFFICE VISIT (OUTPATIENT)
Dept: CARDIOLOGY CLINIC | Age: 76
End: 2021-10-29
Payer: MEDICARE

## 2021-10-29 VITALS
RESPIRATION RATE: 16 BRPM | HEART RATE: 85 BPM | DIASTOLIC BLOOD PRESSURE: 78 MMHG | OXYGEN SATURATION: 98 % | WEIGHT: 155 LBS | SYSTOLIC BLOOD PRESSURE: 118 MMHG | BODY MASS INDEX: 25.83 KG/M2 | HEIGHT: 65 IN

## 2021-10-29 DIAGNOSIS — E78.5 DYSLIPIDEMIA: ICD-10-CM

## 2021-10-29 DIAGNOSIS — G47.33 OBSTRUCTIVE SLEEP APNEA SYNDROME: ICD-10-CM

## 2021-10-29 DIAGNOSIS — R07.9 CHEST PAIN, UNSPECIFIED TYPE: ICD-10-CM

## 2021-10-29 DIAGNOSIS — I10 ESSENTIAL HYPERTENSION: Primary | ICD-10-CM

## 2021-10-29 PROCEDURE — G8399 PT W/DXA RESULTS DOCUMENT: HCPCS | Performed by: INTERNAL MEDICINE

## 2021-10-29 PROCEDURE — 99214 OFFICE O/P EST MOD 30 MIN: CPT | Performed by: INTERNAL MEDICINE

## 2021-10-29 PROCEDURE — 1036F TOBACCO NON-USER: CPT | Performed by: INTERNAL MEDICINE

## 2021-10-29 PROCEDURE — 1123F ACP DISCUSS/DSCN MKR DOCD: CPT | Performed by: INTERNAL MEDICINE

## 2021-10-29 PROCEDURE — G8417 CALC BMI ABV UP PARAM F/U: HCPCS | Performed by: INTERNAL MEDICINE

## 2021-10-29 PROCEDURE — 1090F PRES/ABSN URINE INCON ASSESS: CPT | Performed by: INTERNAL MEDICINE

## 2021-10-29 PROCEDURE — G8427 DOCREV CUR MEDS BY ELIG CLIN: HCPCS | Performed by: INTERNAL MEDICINE

## 2021-10-29 PROCEDURE — G8484 FLU IMMUNIZE NO ADMIN: HCPCS | Performed by: INTERNAL MEDICINE

## 2021-10-29 PROCEDURE — 4040F PNEUMOC VAC/ADMIN/RCVD: CPT | Performed by: INTERNAL MEDICINE

## 2021-10-29 ASSESSMENT — ENCOUNTER SYMPTOMS
SHORTNESS OF BREATH: 1
EYES NEGATIVE: 1
BLOOD IN STOOL: 0
GASTROINTESTINAL NEGATIVE: 1
STRIDOR: 0
WHEEZING: 0
NAUSEA: 0
COUGH: 0
CHEST TIGHTNESS: 0

## 2021-10-29 NOTE — PROGRESS NOTES
Subsequent Progress Note  Patient: Rene Peng  YOB: 1945  MRN: [id number]    Chief Complaint: CP BEATRICE HTN SIERRA dizzy  Chief Complaint   Patient presents with    Follow-up     4 month    Hypertension    Gastroesophageal Reflux     has beed having acid reflux with the pantoprazole    Fatigue   Prior Dr. Antonia Chung pt. 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 1/2020 spect negative. 1/2020 echo ef 60  1/2020 CUS mild   2/21CUS mild     Subjective/HPI: recent in hosp for CP. No ACS and released. She still has CP sometimes related to eating other times not. She has signficant SIERRA with walking. Getting worse. Has BEATRICE but not using CPAP as it leaks a lot. 1/30/2020 fell this past Saturday while picking up dog waste. No major injuries. She walked in here today. She has been dizzy lately. 9/2/2020 still dizzy but no further falls no bleed. Takes meds. stopped asa. . she has developed Dysphagia and choking with solids and liquids. 1/4/21 no cp no sob no falls no bleed no falls decreased appetite. Life  Long nonsmoker    5/4/21 no cp no sob no falls no bleed. Had bladde simulator for incontinence but not effective    10/29/21 now having burning cp and diaphoresis no radiation. Still has sierra. No bleed no falls takes meds. No ETOH  Retired- .    +FH  nonsmoker    EKG:    Past Medical History:   Diagnosis Date    Anticoagulant long-term use     Carotid artery stenosis     Family history of early CAD 6/28/2016    Fibromyalgia     GERD (gastroesophageal reflux disease)     Hypertension     Neuropathy     Sleep apnea     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 myelopathy or radiculopathy        Past Surgical History:   Procedure Laterality Date    BLADDER SURGERY      stimulator implant    BLADDER SUSPENSION      CHOLECYSTECTOMY      COLONOSCOPY  9/22/2020    COLONOSCOPY WITH BIOPSY AND POLYPECTOMY performed by Madeleine Ying MD at [facility] Gastrointestinal: Negative. Negative for blood in stool and nausea. Genitourinary: Negative. Musculoskeletal: Negative. Skin: Negative. Neurological: Positive for dizziness and light-headedness. Negative for syncope and weakness. Hematological: Negative. Psychiatric/Behavioral: Negative. Physical Examination:    /78 (Site: Right Upper Arm, Position: Sitting, Cuff Size: Medium Adult)   Pulse 85   Resp 16   Ht 5' 5\" (1.651 m)   Wt 155 lb (70.3 kg)   SpO2 98%   BMI 25.79 kg/m²    Physical Exam   Constitutional: She appears healthy. No distress. HENT:   Normal cephalic and Atraumatic   Eyes: Pupils are equal, round, and reactive to light. Neck: Thyroid normal. No JVD present. No neck adenopathy. No thyromegaly present. Cardiovascular: Normal rate, regular rhythm, intact distal pulses and normal pulses. Murmur heard. Pulses:       Carotid pulses are on the right side with bruit and on the left side with bruit. Pulmonary/Chest: Effort normal and breath sounds normal. She has no wheezes. She has no rales. She exhibits no tenderness. Abdominal: Soft. Bowel sounds are normal. There is no abdominal tenderness. Musculoskeletal:         General: No tenderness or edema. Normal range of motion. Cervical back: Normal range of motion and neck supple. Neurological: She is alert and oriented to person, place, and time. Skin: Skin is warm. No cyanosis. Nails show no clubbing.        LABS:  CBC:   Lab Results   Component Value Date    WBC 6.9 09/04/2020    RBC 4.46 09/04/2020    RBC 4.35 06/01/2012    HGB 13.2 09/04/2020    HCT 40.2 09/04/2020    MCV 90.1 09/04/2020    MCH 29.6 09/04/2020    MCHC 32.8 09/04/2020    RDW 14.2 09/04/2020     09/04/2020    MPV 9.0 09/03/2014     Lipids:  Lab Results   Component Value Date    CHOL 128 06/30/2021    CHOL 169 09/21/2019    CHOL 105 06/16/2016     Lab Results   Component Value Date    TRIG 112 06/30/2021    TRIG 132 06/16/2016    TRIG 188 09/02/2014     Lab Results   Component Value Date    HDL 36 (L) 06/30/2021    HDL 40 06/16/2016    HDL 49 09/02/2014     Lab Results   Component Value Date    LDLCALC 70 06/30/2021    LDLCALC 39 06/16/2016    LDLCALC 102 09/02/2014     No results found for: LABVLDL, VLDL  Lab Results   Component Value Date    CHOLHDLRATIO 4.3 05/31/2012    CHOLHDLRATIO 4.6 05/30/2012     CMP:    Lab Results   Component Value Date     07/06/2021    K 4.3 07/06/2021     07/06/2021    CO2 24 07/06/2021    BUN 10 07/06/2021    CREATININE 0.71 07/06/2021    GFRAA >60.0 07/06/2021    LABGLOM >60.0 07/06/2021    GLUCOSE 94 07/06/2021    GLUCOSE 139 06/01/2012    PROT 7.1 06/30/2021    LABALBU 4.2 06/30/2021    LABALBU 4.1 05/31/2012    CALCIUM 9.9 07/06/2021    BILITOT 0.5 06/30/2021    ALKPHOS 89 06/30/2021    AST 26 06/30/2021    ALT 19 06/30/2021     BMP:    Lab Results   Component Value Date     07/06/2021    K 4.3 07/06/2021     07/06/2021    CO2 24 07/06/2021    BUN 10 07/06/2021    LABALBU 4.2 06/30/2021    LABALBU 4.1 05/31/2012    CREATININE 0.71 07/06/2021    CALCIUM 9.9 07/06/2021    GFRAA >60.0 07/06/2021    LABGLOM >60.0 07/06/2021    GLUCOSE 94 07/06/2021    GLUCOSE 139 06/01/2012     Magnesium:    Lab Results   Component Value Date    MG 2.2 12/26/2019    MG 2.1 05/30/2012     TSH:  Lab Results   Component Value Date    TSH 1.750 09/02/2014       Patient Active Problem List   Diagnosis    Dysphagia    Arterial fibromuscular dysplasia (HCC)    Fibromyalgia    Gastroesophageal reflux disease    Obstructive sleep apnea syndrome    Peripheral neuropathy (HCC)    Varicose veins of lower extremity    Chest pain    BEATRICE (obstructive sleep apnea)    Stenosis of right carotid artery    Dyslipidemia    BEATRICE on CPAP    Essential hypertension    Dizziness of unknown etiology    Gastric polyp    Esophageal stricture    Acute cystitis with hematuria    Acute bacterial sinusitis    Frequency of urination    Sensorineural hearing loss (SNHL) of both ears       Medications Discontinued During This Encounter   Medication Reason    citalopram (CELEXA) 20 MG tablet LIST CLEANUP       Modified Medications    No medications on file       No orders of the defined types were placed in this encounter. Assessment/Plan:    1. Essential hypertension   stable   And we will dc Low dose Losartan 25 given Postional dizizness. - stable - continue meds. Low salt diet    2. Chest pain, unspecified type - will need spect and then RTO     3. BEATRICE (obstructive sleep apnea)  Will need Pul referral   Not using CPAP. --- did not see Pulm    4. Stenosis of right carotid artery - resume ASA w Food. 5. Dyslipidemia - pt stopped Atorvastatin. - will resume atorva 20. - f/u labs. Low fat diet. 6. GERD- try Protonix 40 bid - much improved. 7. Dysphagia - s/p Esophageal dilation. 8. Car brutis- CUS stable   Counseling:  Heart Healthy Lifestyle, Low Salt Diet, Take Precautions to Prevent Falls and Walk Daily    Return for AFTER TESTS.       Electronically signed by Dino Ruvalcaba MD on 10/29/2021 at 12:02 PM

## 2021-12-06 ENCOUNTER — HOSPITAL ENCOUNTER (OUTPATIENT)
Dept: NUCLEAR MEDICINE | Age: 76
Discharge: HOME | End: 2021-12-08
Payer: MEDICARE

## 2021-12-06 ENCOUNTER — HOSPITAL ENCOUNTER (OUTPATIENT)
Dept: NON INVASIVE DIAGNOSTICS | Age: 76
Discharge: HOME | End: 2021-12-06
Payer: MEDICARE

## 2021-12-06 DIAGNOSIS — R07.9 CHEST PAIN, UNSPECIFIED TYPE: ICD-10-CM

## 2021-12-06 LAB
LV EF: 61 %
LV EF: 65 %
LVEF MODALITY: NORMAL
LVEF MODALITY: NORMAL

## 2021-12-06 PROCEDURE — 3430000000 HC RX DIAGNOSTIC RADIOPHARMACEUTICAL: Performed by: INTERNAL MEDICINE

## 2021-12-06 PROCEDURE — 2580000003 HC RX 258: Performed by: INTERNAL MEDICINE

## 2021-12-06 PROCEDURE — 93017 CV STRESS TEST TRACING ONLY: CPT

## 2021-12-06 PROCEDURE — C8923 2D TTE W OR W/O FOL W/CON,CO: HCPCS

## 2021-12-06 PROCEDURE — A9502 TC99M TETROFOSMIN: HCPCS | Performed by: INTERNAL MEDICINE

## 2021-12-06 PROCEDURE — 78452 HT MUSCLE IMAGE SPECT MULT: CPT | Performed by: INTERNAL MEDICINE

## 2021-12-06 PROCEDURE — 78452 HT MUSCLE IMAGE SPECT MULT: CPT

## 2021-12-06 PROCEDURE — 6360000002 HC RX W HCPCS: Performed by: INTERNAL MEDICINE

## 2021-12-06 RX ORDER — SODIUM CHLORIDE 0.9 % (FLUSH) 0.9 %
10 SYRINGE (ML) INJECTION PRN
Status: DISCONTINUED | OUTPATIENT
Start: 2021-12-06 | End: 2021-12-09 | Stop reason: HOSPADM

## 2021-12-06 RX ADMIN — TETROFOSMIN 35.4 MILLICURIE: 1.38 INJECTION, POWDER, LYOPHILIZED, FOR SOLUTION INTRAVENOUS at 10:55

## 2021-12-06 RX ADMIN — TETROFOSMIN 11.4 MILLICURIE: 1.38 INJECTION, POWDER, LYOPHILIZED, FOR SOLUTION INTRAVENOUS at 09:27

## 2021-12-06 RX ADMIN — REGADENOSON 0.4 MG: 0.08 INJECTION, SOLUTION INTRAVENOUS at 10:54

## 2021-12-06 RX ADMIN — Medication 10 ML: at 10:55

## 2021-12-06 RX ADMIN — Medication 10 ML: at 10:54

## 2021-12-06 RX ADMIN — Medication 10 ML: at 09:26

## 2021-12-06 NOTE — PROGRESS NOTES
Reviewed history, allergies, and medications. Consent confirmed. Lexiscan exam explained. Placed patient on monitor. [address] here to inject [place]. SOB noted during recovery phase. Denied chest pain. No ectopy noted. Patient off monitor and instructed to eat, will have last part of exam in 1 hour.

## 2021-12-09 ENCOUNTER — TELEPHONE (OUTPATIENT)
Dept: CARDIOLOGY CLINIC | Age: 76
End: 2021-12-09

## 2021-12-09 ENCOUNTER — OFFICE VISIT (OUTPATIENT)
Dept: CARDIOLOGY CLINIC | Age: 76
End: 2021-12-09
Payer: MEDICARE

## 2021-12-09 VITALS
OXYGEN SATURATION: 98 % | HEART RATE: 76 BPM | SYSTOLIC BLOOD PRESSURE: 126 MMHG | RESPIRATION RATE: 16 BRPM | DIASTOLIC BLOOD PRESSURE: 88 MMHG | HEIGHT: 65 IN | BODY MASS INDEX: 26.42 KG/M2 | WEIGHT: 158.6 LBS

## 2021-12-09 DIAGNOSIS — R09.89 BILATERAL CAROTID BRUITS: ICD-10-CM

## 2021-12-09 DIAGNOSIS — I10 ESSENTIAL HYPERTENSION: Primary | ICD-10-CM

## 2021-12-09 DIAGNOSIS — R94.39 ABNORMAL NUCLEAR STRESS TEST: ICD-10-CM

## 2021-12-09 DIAGNOSIS — E78.5 DYSLIPIDEMIA: ICD-10-CM

## 2021-12-09 DIAGNOSIS — G47.33 OBSTRUCTIVE SLEEP APNEA SYNDROME: ICD-10-CM

## 2021-12-09 DIAGNOSIS — R07.9 CHEST PAIN, UNSPECIFIED TYPE: ICD-10-CM

## 2021-12-09 DIAGNOSIS — I10 ESSENTIAL HYPERTENSION: ICD-10-CM

## 2021-12-09 LAB
ANION GAP SERPL CALCULATED.3IONS-SCNC: 17 MEQ/L (ref 9–15)
BUN BLDV-MCNC: 15 MG/DL (ref 8–23)
CALCIUM SERPL-MCNC: 9.2 MG/DL (ref 8.5–9.9)
CHLORIDE BLD-SCNC: 107 MEQ/L (ref 95–107)
CO2: 22 MEQ/L (ref 20–31)
CREAT SERPL-MCNC: 0.86 MG/DL (ref 0.5–0.9)
GFR AFRICAN AMERICAN: >60
GFR NON-AFRICAN AMERICAN: >60
GLUCOSE BLD-MCNC: 88 MG/DL (ref 70–99)
HCT VFR BLD CALC: 39.5 % (ref 37–47)
HEMOGLOBIN: 13 G/DL (ref 12–16)
MCH RBC QN AUTO: 30.3 PG (ref 27–31.3)
MCHC RBC AUTO-ENTMCNC: 32.9 % (ref 33–37)
MCV RBC AUTO: 92 FL (ref 82–100)
PDW BLD-RTO: 14.4 % (ref 11.5–14.5)
PLATELET # BLD: 254 K/UL (ref 130–400)
POTASSIUM SERPL-SCNC: 4.1 MEQ/L (ref 3.4–4.9)
RBC # BLD: 4.3 M/UL (ref 4.2–5.4)
SODIUM BLD-SCNC: 146 MEQ/L (ref 135–144)
WBC # BLD: 7.2 K/UL (ref 4.8–10.8)

## 2021-12-09 PROCEDURE — G8484 FLU IMMUNIZE NO ADMIN: HCPCS | Performed by: INTERNAL MEDICINE

## 2021-12-09 PROCEDURE — G8417 CALC BMI ABV UP PARAM F/U: HCPCS | Performed by: INTERNAL MEDICINE

## 2021-12-09 PROCEDURE — 1090F PRES/ABSN URINE INCON ASSESS: CPT | Performed by: INTERNAL MEDICINE

## 2021-12-09 PROCEDURE — 99215 OFFICE O/P EST HI 40 MIN: CPT | Performed by: INTERNAL MEDICINE

## 2021-12-09 PROCEDURE — G8427 DOCREV CUR MEDS BY ELIG CLIN: HCPCS | Performed by: INTERNAL MEDICINE

## 2021-12-09 PROCEDURE — 4040F PNEUMOC VAC/ADMIN/RCVD: CPT | Performed by: INTERNAL MEDICINE

## 2021-12-09 PROCEDURE — 1123F ACP DISCUSS/DSCN MKR DOCD: CPT | Performed by: INTERNAL MEDICINE

## 2021-12-09 PROCEDURE — G8399 PT W/DXA RESULTS DOCUMENT: HCPCS | Performed by: INTERNAL MEDICINE

## 2021-12-09 PROCEDURE — 1036F TOBACCO NON-USER: CPT | Performed by: INTERNAL MEDICINE

## 2021-12-09 RX ORDER — SODIUM CHLORIDE 0.9 % (FLUSH) 0.9 %
5-40 SYRINGE (ML) INJECTION PRN
Status: CANCELLED | OUTPATIENT
Start: 2021-12-09

## 2021-12-09 RX ORDER — SODIUM CHLORIDE 450 MG/100ML
75 INJECTION, SOLUTION INTRAVENOUS CONTINUOUS
Status: CANCELLED | OUTPATIENT
Start: 2021-12-09

## 2021-12-09 RX ORDER — ONDANSETRON 2 MG/ML
4 INJECTION INTRAMUSCULAR; INTRAVENOUS EVERY 6 HOURS PRN
Status: CANCELLED | OUTPATIENT
Start: 2021-12-09

## 2021-12-09 RX ORDER — PROPRANOLOL HYDROCHLORIDE 10 MG/1
10 TABLET ORAL DAILY
COMMUNITY
Start: 2021-11-17

## 2021-12-09 RX ORDER — NITROGLYCERIN 0.4 MG/1
0.4 TABLET SUBLINGUAL EVERY 5 MIN PRN
Status: CANCELLED | OUTPATIENT
Start: 2021-12-09

## 2021-12-09 RX ORDER — SODIUM CHLORIDE 0.9 % (FLUSH) 0.9 %
5-40 SYRINGE (ML) INJECTION EVERY 12 HOURS SCHEDULED
Status: CANCELLED | OUTPATIENT
Start: 2021-12-09

## 2021-12-09 RX ORDER — SODIUM CHLORIDE 9 MG/ML
25 INJECTION, SOLUTION INTRAVENOUS PRN
Status: CANCELLED | OUTPATIENT
Start: 2021-12-09

## 2021-12-09 RX ORDER — METOPROLOL SUCCINATE 50 MG/1
50 TABLET, EXTENDED RELEASE ORAL DAILY
Qty: 90 TABLET | Refills: 3 | Status: SHIPPED | OUTPATIENT
Start: 2021-12-09

## 2021-12-09 RX ORDER — DIPHENHYDRAMINE HYDROCHLORIDE 50 MG/ML
50 INJECTION INTRAMUSCULAR; INTRAVENOUS ONCE
Status: CANCELLED | OUTPATIENT
Start: 2021-12-09 | End: 2021-12-09

## 2021-12-09 ASSESSMENT — ENCOUNTER SYMPTOMS
COUGH: 0
EYES NEGATIVE: 1
BLOOD IN STOOL: 0
STRIDOR: 0
WHEEZING: 0
GASTROINTESTINAL NEGATIVE: 1
SHORTNESS OF BREATH: 1
CHEST TIGHTNESS: 0
NAUSEA: 0

## 2021-12-09 NOTE — PROGRESS NOTES
BIOPSY AND POLYPECTOMY performed by Enzo Doshi MD at [address]      UPPER GASTROINTESTINAL ENDOSCOPY N/A 9/22/2020    EGD WITH POLYPECTOMY AND DILATION performed by Enzo Doshi MD at [facility]       Family History   Problem Relation Age of Onset   [facility] Crohn's Disease Sister     Diabetes Sister     Heart Disease Mother     Kidney Disease Mother     Heart Disease Father     Obesity Sister     Heart Disease Sister     Colon Cancer Neg Hx        Social History     Socioeconomic History    Marital status:      Spouse name: None    Number of children: None    Years of education: None    Highest education level: None   Occupational History    None   Tobacco Use    Smoking status: Never Smoker    Smokeless tobacco: Never Used   Vaping Use    Vaping Use: Never used   Substance and Sexual Activity    Alcohol use: No    Drug use: Never    Sexual activity: None   Other Topics Concern    None   Social History Narrative    None     Social Determinants of Health     Financial Resource Strain: Low Risk     Difficulty of Paying Living Expenses: Not hard at all   Food Insecurity: No Food Insecurity    Worried About Running Out of Food in the Last Year: Never true    Patty of Food in the Last Year: Never true   Transportation Needs: No Transportation Needs    Lack of Transportation (Medical): No    Lack of Transportation (Non-Medical):  No   Physical Activity:     Days of Exercise per Week: Not on file    Minutes of Exercise per Session: Not on file   Stress:     Feeling of Stress : Not on file   Social Connections:     Frequency of Communication with Friends and Family: Not on file    Frequency of Social Gatherings with Friends and Family: Not on file    Attends Muslim Services: Not on file    Active Member of Clubs or Organizations: Not on file    Attends Club or Organization Meetings: Not on file    Marital Status: Not on file   Intimate Partner Violence:     Fear of Current or Ex-Partner: Not on file    Emotionally Abused: Not on file    Physically Abused: Not on file    Sexually Abused: Not on file   Housing Stability:     Unable to Pay for Housing in the Last Year: Not on file    Number of Carleen in the Last Year: Not on file    Unstable Housing in the Last Year: Not on file       Allergies   Allergen Reactions    Latex     Amoxicillin-Pot Clavulanate      Other reaction(s): Vomiting    Carbidopa-Levodopa      Other reaction(s): Unknown  Effects the eyes    Iv [Iodides]     Morphine Other (See Comments)     Made her go 'berserk '    Nitrofurantoin Other (See Comments)     Myalgia    Plavix [Clopidogrel Bisulfate]     Pravastatin Other (See Comments)     Myalgia       Current Outpatient Medications   Medication Sig Dispense Refill    metoprolol succinate (TOPROL XL) 50 MG extended release tablet Take 1 tablet by mouth daily 90 tablet 3    zoster recombinant adjuvanted vaccine (SHINGRIX) 50 MCG/0.5ML SUSR injection Inject 0.5 mLs into the muscle See Admin Instructions 1 dose now and repeat in 2-6 months 0.5 mL 0    pantoprazole (PROTONIX) 40 MG tablet Take 1 tablet by mouth 2 times daily (before meals) 60 tablet 5    aspirin EC 81 MG EC tablet Take 1 tablet by mouth daily 90 tablet 3    propranolol (INDERAL) 10 MG tablet Take 10 mg by mouth daily      meloxicam (MOBIC) 7.5 MG tablet Take 1 tablet by mouth daily (Patient not taking: Reported on 10/29/2021) 30 tablet 3    atorvastatin (LIPITOR) 20 MG tablet Take 1 tablet by mouth daily (Patient not taking: Reported on 12/9/2021) 90 tablet 3    fluticasone (FLONASE) 50 MCG/ACT nasal spray 1 spray by Nasal route 2 times daily (Patient not taking: Reported on 12/9/2021) 1 Bottle 3    prednisoLONE acetate (PRED FORTE) 1 % ophthalmic suspension INSTILL 1 DROP INTO RIGHT EYE 6 TIMES A DAY FOR 2 DAYS THEN DECREASE TO 4 TIMES A DAY      primidone (MYSOLINE) 50 MG tablet Take 0.5 tablets by mouth nightly (Patient not taking: Reported on 12/9/2021) 16 tablet 3    Wheat Dextrin (Theotis Francis) POWD Start with 2 to 3 tsp daily with 8 oz of water, titrate based on stool consistency, titrate down based on bloating & abdominal cramps (Patient not taking: Reported on 12/9/2021) 1 Can 3     No current facility-administered medications for this visit. Review of Systems:   Review of Systems   Constitutional: Positive for fatigue. Negative for diaphoresis. HENT: Negative. Eyes: Negative. Respiratory: Positive for shortness of breath. Negative for cough, chest tightness, wheezing and stridor. Cardiovascular: Positive for chest pain. Negative for palpitations and leg swelling. Gastrointestinal: Negative. Negative for blood in stool and nausea. Genitourinary: Negative. Musculoskeletal: Negative. Skin: Negative. Neurological: Positive for dizziness and light-headedness. Negative for syncope and weakness. Hematological: Negative. Psychiatric/Behavioral: Negative. Physical Examination:    /88 (Site: Right Upper Arm, Position: Sitting, Cuff Size: Small Adult)   Pulse 76   Resp 16   Ht 5' 5\" (1.651 m)   Wt 158 lb 9.6 oz (71.9 kg)   SpO2 98%   BMI 26.39 kg/m²    Physical Exam   Constitutional: She appears healthy. No distress. HENT:   Normal cephalic and Atraumatic   Eyes: Pupils are equal, round, and reactive to light. Neck: Thyroid normal. No JVD present. No neck adenopathy. No thyromegaly present. Cardiovascular: Normal rate, regular rhythm, intact distal pulses and normal pulses. Murmur heard. Pulses:       Carotid pulses are on the right side with bruit and on the left side with bruit. Pulmonary/Chest: Effort normal and breath sounds normal. She has no wheezes. She has no rales. She exhibits no tenderness. Abdominal: Soft. Bowel sounds are normal. There is no abdominal tenderness.    Musculoskeletal:         General: No tenderness or edema. Normal range of motion. Cervical back: Normal range of motion and neck supple. Neurological: She is alert and oriented to person, place, and time. Skin: Skin is warm. No cyanosis. Nails show no clubbing.        LABS:  CBC:   Lab Results   Component Value Date    WBC 6.9 09/04/2020    RBC 4.46 09/04/2020    RBC 4.35 06/01/2012    HGB 13.2 09/04/2020    HCT 40.2 09/04/2020    MCV 90.1 09/04/2020    MCH 29.6 09/04/2020    MCHC 32.8 09/04/2020    RDW 14.2 09/04/2020     09/04/2020    MPV 9.0 09/03/2014     Lipids:  Lab Results   Component Value Date    CHOL 128 06/30/2021    CHOL 169 09/21/2019    CHOL 105 06/16/2016     Lab Results   Component Value Date    TRIG 112 06/30/2021    TRIG 132 06/16/2016    TRIG 188 09/02/2014     Lab Results   Component Value Date    HDL 36 (L) 06/30/2021    HDL 40 06/16/2016    HDL 49 09/02/2014     Lab Results   Component Value Date    LDLCALC 70 06/30/2021    LDLCALC 39 06/16/2016    LDLCALC 102 09/02/2014     No results found for: LABVLDL, VLDL  Lab Results   Component Value Date    CHOLHDLRATIO 4.3 05/31/2012    CHOLHDLRATIO 4.6 05/30/2012     CMP:    Lab Results   Component Value Date     07/06/2021    K 4.3 07/06/2021     07/06/2021    CO2 24 07/06/2021    BUN 10 07/06/2021    CREATININE 0.71 07/06/2021    GFRAA >60.0 07/06/2021    LABGLOM >60.0 07/06/2021    GLUCOSE 94 07/06/2021    GLUCOSE 139 06/01/2012    PROT 7.1 06/30/2021    LABALBU 4.2 06/30/2021    LABALBU 4.1 05/31/2012    CALCIUM 9.9 07/06/2021    BILITOT 0.5 06/30/2021    ALKPHOS 89 06/30/2021    AST 26 06/30/2021    ALT 19 06/30/2021     BMP:    Lab Results   Component Value Date     07/06/2021    K 4.3 07/06/2021     07/06/2021    CO2 24 07/06/2021    BUN 10 07/06/2021    LABALBU 4.2 06/30/2021    LABALBU 4.1 05/31/2012    CREATININE 0.71 07/06/2021    CALCIUM 9.9 07/06/2021    GFRAA >60.0 07/06/2021    LABGLOM >60.0 07/06/2021    GLUCOSE 94 07/06/2021 GLUCOSE 139 06/01/2012     Magnesium:    Lab Results   Component Value Date    MG 2.2 12/26/2019    MG 2.1 05/30/2012     TSH:  Lab Results   Component Value Date    TSH 1.750 09/02/2014       Patient Active Problem List   Diagnosis    Dysphagia    Arterial fibromuscular dysplasia (HCC)    Fibromyalgia    Gastroesophageal reflux disease    Obstructive sleep apnea syndrome    Peripheral neuropathy (HCC)    Varicose veins of lower extremity    Chest pain    BEATRICE (obstructive sleep apnea)    Stenosis of right carotid artery    Dyslipidemia    BEATRICE on CPAP    Essential hypertension    Dizziness of unknown etiology    Gastric polyp    Esophageal stricture    Acute cystitis with hematuria    Acute bacterial sinusitis    Frequency of urination    Sensorineural hearing loss (SNHL) of both ears       There are no discontinued medications. Modified Medications    No medications on file       Orders Placed This Encounter   Medications    metoprolol succinate (TOPROL XL) 50 MG extended release tablet     Sig: Take 1 tablet by mouth daily     Dispense:  90 tablet     Refill:  3       Assessment/Plan:    1. Essential hypertension   stable   And we will dc Low dose Losartan 25 given Postional dizizness. - stable - continue meds. Low salt diet    2. Chest pain, unspecified type - RBA LHC/PCI dioscussed will proceed. Labs today. Add Toprol XL 50      3. BEATRICE (obstructive sleep apnea)  Will need Pul referral   Not using CPAP. --- did not see Pulm    4. Stenosis of right carotid artery - resume ASA w Food. 5. Dyslipidemia - pt stopped Atorvastatin. - will resume atorva 20. - f/u labs. Low fat diet. 6. GERD- try Protonix 40 bid - much improved. 7. Dysphagia - s/p Esophageal dilation. 8. Car brutis- CUS stable   Counseling:  Heart Healthy Lifestyle, Low Salt Diet, Take Precautions to Prevent Falls and Walk Daily    Return for Schedule Cath.       Electronically signed by Amina Negrete MD on 12/9/2021 at 1:13 PM

## 2021-12-29 ENCOUNTER — CLINICAL DOCUMENTATION (OUTPATIENT)
Dept: CARDIOLOGY CLINIC | Age: 76
End: 2021-12-29

## 2021-12-29 NOTE — PROGRESS NOTES
I called and spoke with [address] [place] [address] [employer] and they were still stating that they had not received the request along with the [employer]pages onrininfof clinical for the cpt [id number] [phone] [id number]. I spoke with rep and a verbal encounter started with the tracking # [id number] today. The case did not meet criteria and I send further data sent for physician review.  I will alert the patient that we are awaiting approval.

## 2021-12-30 ENCOUNTER — TELEPHONE (OUTPATIENT)
Dept: CARDIOLOGY CLINIC | Age: 76
End: 2021-12-30

## 2021-12-30 NOTE — TELEPHONE ENCOUNTER
Dr. Julius Kaba I am trying to get auth for the patient to have the cardiac cath. The nuclear was abnormal and Dr. Krishna Angel did read as evidence of inferior apical ischemic defect but he did not mention the size. Before they will provide me with an approval or denial the Clinical nurse reviewer needs to know the size of the defect. Dr. Krishna Angel is out today and will not be back in office until Monday. Can you review the stress to see the size. They will take a verbal documentation. Thank you.

## 2021-12-30 NOTE — TELEPHONE ENCOUNTER
I called the [address] [facility] and left a message with the nurse review re: the updated information on the Stress testing.

## 2021-12-30 NOTE — TELEPHONE ENCOUNTER
Dr. Velasquez He is on call all weekend. He can't be out. In any event. I looked at it. It looks to be large involving the entire Inferoapical territory.

## 2022-01-11 ENCOUNTER — HOSPITAL ENCOUNTER (OUTPATIENT)
Dept: CARDIAC CATH/INVASIVE PROCEDURES | Age: 77
Discharge: HOME | End: 2022-01-11
Attending: INTERNAL MEDICINE | Admitting: INTERNAL MEDICINE
Payer: MEDICARE

## 2022-01-11 VITALS
BODY MASS INDEX: 25.83 KG/M2 | OXYGEN SATURATION: 97 % | WEIGHT: 155 LBS | DIASTOLIC BLOOD PRESSURE: 79 MMHG | SYSTOLIC BLOOD PRESSURE: 141 MMHG | TEMPERATURE: 97.9 F | HEIGHT: 65 IN | HEART RATE: 69 BPM | RESPIRATION RATE: 18 BRPM

## 2022-01-11 PROCEDURE — 2709999900 HC NON-CHARGEABLE SUPPLY

## 2022-01-11 PROCEDURE — 6360000002 HC RX W HCPCS: Performed by: INTERNAL MEDICINE

## 2022-01-11 PROCEDURE — 99153 MOD SED SAME PHYS/QHP EA: CPT

## 2022-01-11 PROCEDURE — 6360000002 HC RX W HCPCS

## 2022-01-11 PROCEDURE — 6360000004 HC RX CONTRAST MEDICATION: Performed by: INTERNAL MEDICINE

## 2022-01-11 PROCEDURE — C1769 GUIDE WIRE: HCPCS

## 2022-01-11 PROCEDURE — 99152 MOD SED SAME PHYS/QHP 5/>YRS: CPT

## 2022-01-11 PROCEDURE — 2500000003 HC RX 250 WO HCPCS

## 2022-01-11 PROCEDURE — 93458 L HRT ARTERY/VENTRICLE ANGIO: CPT

## 2022-01-11 PROCEDURE — C1894 INTRO/SHEATH, NON-LASER: HCPCS

## 2022-01-11 PROCEDURE — 93458 L HRT ARTERY/VENTRICLE ANGIO: CPT | Performed by: INTERNAL MEDICINE

## 2022-01-11 PROCEDURE — 2580000003 HC RX 258

## 2022-01-11 RX ORDER — SODIUM CHLORIDE 0.9 % (FLUSH) 0.9 %
5-40 SYRINGE (ML) INJECTION EVERY 12 HOURS SCHEDULED
Status: DISCONTINUED | OUTPATIENT
Start: 2022-01-11 | End: 2022-01-11 | Stop reason: HOSPADM

## 2022-01-11 RX ORDER — SODIUM CHLORIDE 0.9 % (FLUSH) 0.9 %
5-40 SYRINGE (ML) INJECTION PRN
Status: DISCONTINUED | OUTPATIENT
Start: 2022-01-11 | End: 2022-01-11 | Stop reason: HOSPADM

## 2022-01-11 RX ORDER — SODIUM CHLORIDE 9 MG/ML
25 INJECTION, SOLUTION INTRAVENOUS PRN
Status: DISCONTINUED | OUTPATIENT
Start: 2022-01-11 | End: 2022-01-11 | Stop reason: HOSPADM

## 2022-01-11 RX ORDER — ONDANSETRON 2 MG/ML
4 INJECTION INTRAMUSCULAR; INTRAVENOUS EVERY 6 HOURS PRN
Status: DISCONTINUED | OUTPATIENT
Start: 2022-01-11 | End: 2022-01-11 | Stop reason: HOSPADM

## 2022-01-11 RX ORDER — DIPHENHYDRAMINE HYDROCHLORIDE 50 MG/ML
50 INJECTION INTRAMUSCULAR; INTRAVENOUS ONCE
Status: COMPLETED | OUTPATIENT
Start: 2022-01-11 | End: 2022-01-11

## 2022-01-11 RX ORDER — SODIUM CHLORIDE 450 MG/100ML
75 INJECTION, SOLUTION INTRAVENOUS CONTINUOUS
Status: DISCONTINUED | OUTPATIENT
Start: 2022-01-11 | End: 2022-01-11 | Stop reason: HOSPADM

## 2022-01-11 RX ORDER — SODIUM CHLORIDE 450 MG/100ML
INJECTION, SOLUTION INTRAVENOUS CONTINUOUS
Status: DISCONTINUED | OUTPATIENT
Start: 2022-01-11 | End: 2022-01-11 | Stop reason: HOSPADM

## 2022-01-11 RX ORDER — NITROGLYCERIN 0.4 MG/1
0.4 TABLET SUBLINGUAL EVERY 5 MIN PRN
Status: DISCONTINUED | OUTPATIENT
Start: 2022-01-11 | End: 2022-01-11 | Stop reason: HOSPADM

## 2022-01-11 RX ORDER — ACETAMINOPHEN 325 MG/1
650 TABLET ORAL EVERY 4 HOURS PRN
Status: DISCONTINUED | OUTPATIENT
Start: 2022-01-11 | End: 2022-01-11 | Stop reason: HOSPADM

## 2022-01-11 RX ADMIN — IOPAMIDOL 60 ML: 612 INJECTION, SOLUTION INTRAVENOUS at 09:05

## 2022-01-11 RX ADMIN — HYDROCORTISONE SODIUM SUCCINATE 200 MG: 100 INJECTION, POWDER, FOR SOLUTION INTRAMUSCULAR; INTRAVENOUS at 08:10

## 2022-01-11 RX ADMIN — DIPHENHYDRAMINE HYDROCHLORIDE 50 MG: 50 INJECTION, SOLUTION INTRAMUSCULAR; INTRAVENOUS at 08:11

## 2022-01-11 NOTE — PROGRESS NOTES
Arrived to pre/post from the cath lab and report from [address] to right radial no bleeding or hematoma. 5 Moroccan sheath to right femoral to be pulled.   Attached to monitor and vitals are stable

## 2022-01-11 NOTE — BRIEF OP NOTE
Brief Postoperative Note      Patient: Kumar Silvestre  YOB: 1945  MRN: [id number]     Section of Cardiology  Adult Brief Cardiac Cath Procedure Note        Procedure(s):  LHC, b/l coronary angio via RFA. RRA accessed but wire did not traverse. Pre-operative Diagnosis:  CP    H&P Status: Completed and reviewed. Post-operative Diagnosis:  Normal CORS.  EDP 4 LVEF 55-60    Findings:  See full report    Complications:  none    Primary Proceduralist:   Patti Ovalles MD

## 2022-01-11 NOTE — PROGRESS NOTES
Arrived to pre/post from home. Name and date of birth verified along with allergies and orders. Consents obtained.   Oriented to surroundings

## 2022-01-11 NOTE — PROGRESS NOTES
5 Armenian sheath pulled from right groin and manual pressure held along with the use of a quiklcot Nonverbal Nonverbal/Oriented - self; Oriented - place; Oriented - time

## 2022-01-11 NOTE — PROGRESS NOTES
Hemostasis obtained to right groin. Pressure dressing applied. Patient is resting comfortably.   Will continue to monitor

## 2022-01-11 NOTE — PROGRESS NOTES
Outer band of quikclot removed. No bleeding or hematoma.   Right groin is stable no bleeding or hematoma

## 2022-01-11 NOTE — PROGRESS NOTES
Right groin and right radial no bleeding or hematoma. Patient taking fluids and resting at this time.

## 2022-01-12 ENCOUNTER — VIRTUAL VISIT (OUTPATIENT)
Dept: FAMILY MEDICINE CLINIC | Age: 77
End: 2022-01-12
Payer: MEDICARE

## 2022-01-12 DIAGNOSIS — R10.84 GENERALIZED ABDOMINAL PAIN: Primary | ICD-10-CM

## 2022-01-12 PROCEDURE — G8427 DOCREV CUR MEDS BY ELIG CLIN: HCPCS | Performed by: PHYSICIAN ASSISTANT

## 2022-01-12 PROCEDURE — G8399 PT W/DXA RESULTS DOCUMENT: HCPCS | Performed by: PHYSICIAN ASSISTANT

## 2022-01-12 PROCEDURE — 4040F PNEUMOC VAC/ADMIN/RCVD: CPT | Performed by: PHYSICIAN ASSISTANT

## 2022-01-12 PROCEDURE — G8417 CALC BMI ABV UP PARAM F/U: HCPCS | Performed by: PHYSICIAN ASSISTANT

## 2022-01-12 PROCEDURE — 99213 OFFICE O/P EST LOW 20 MIN: CPT | Performed by: PHYSICIAN ASSISTANT

## 2022-01-12 PROCEDURE — 1036F TOBACCO NON-USER: CPT | Performed by: PHYSICIAN ASSISTANT

## 2022-01-12 PROCEDURE — 1123F ACP DISCUSS/DSCN MKR DOCD: CPT | Performed by: PHYSICIAN ASSISTANT

## 2022-01-12 PROCEDURE — G8484 FLU IMMUNIZE NO ADMIN: HCPCS | Performed by: PHYSICIAN ASSISTANT

## 2022-01-12 PROCEDURE — 1090F PRES/ABSN URINE INCON ASSESS: CPT | Performed by: PHYSICIAN ASSISTANT

## 2022-01-12 ASSESSMENT — ENCOUNTER SYMPTOMS
TROUBLE SWALLOWING: 0
COUGH: 0
ABDOMINAL PAIN: 1
BACK PAIN: 0
SINUS PRESSURE: 0
VOMITING: 0
CHEST TIGHTNESS: 0
DIARRHEA: 0
SHORTNESS OF BREATH: 0

## 2022-01-12 ASSESSMENT — PATIENT HEALTH QUESTIONNAIRE - PHQ9
2. FEELING DOWN, DEPRESSED OR HOPELESS: 0
SUM OF ALL RESPONSES TO PHQ9 QUESTIONS 1 & 2: 0
SUM OF ALL RESPONSES TO PHQ QUESTIONS 1-9: 0
1. LITTLE INTEREST OR PLEASURE IN DOING THINGS: 0
SUM OF ALL RESPONSES TO PHQ QUESTIONS 1-9: 0

## 2022-01-12 NOTE — PATIENT INSTRUCTIONS
Patient Education        Abdominal Pain: Care Instructions  Your Care Instructions     Abdominal pain has many possible causes. Some aren't serious and get better on their own in a few days. Others need more testing and treatment. If your pain continues or gets worse, you need to be rechecked and may need more tests to find out what is wrong. You may need surgery to correct the problem. Don't ignore new symptoms, such as fever, nausea and vomiting, urination problems, pain that gets worse, and dizziness. These may be signs of a more serious problem. Your doctor may have recommended a follow-up visit in the next 8 to 12 hours. If you are not getting better, you may need more tests or treatment. The doctor has checked you carefully, but problems can develop later. If you notice any problems or new symptoms, get medical treatment right away. Follow-up care is a key part of your treatment and safety. Be sure to make and go to all appointments, and call your doctor if you are having problems. It's also a good idea to know your test results and keep a list of the medicines you take. How can you care for yourself at home? · Rest until you feel better. · To prevent dehydration, drink plenty of fluids. Choose water and other clear liquids until you feel better. If you have kidney, heart, or liver disease and have to limit fluids, talk with your doctor before you increase the amount of fluids you drink. · If your stomach is upset, eat mild foods, such as rice, dry toast or crackers, bananas, and applesauce. Try eating several small meals instead of two or three large ones. · Wait until 48 hours after all symptoms have gone away before you have spicy foods, alcohol, and drinks that contain caffeine. · Do not eat foods that are high in fat. · Avoid anti-inflammatory medicines such as aspirin, ibuprofen (Advil, Motrin), and naproxen (Aleve). These can cause stomach upset.  Talk to your doctor if you take daily aspirin for another health problem. When should you call for help? Call 911 anytime you think you may need emergency care. For example, call if:    · You passed out (lost consciousness).     · You pass maroon or very bloody stools.     · You vomit blood or what looks like coffee grounds.     · You have new, severe belly pain. Call your doctor now or seek immediate medical care if:    · Your pain gets worse, especially if it becomes focused in one area of your belly.     · You have a new or higher fever.     · Your stools are black and look like tar, or they have streaks of blood.     · You have unexpected vaginal bleeding.     · You have symptoms of a urinary tract infection. These may include:  ? Pain when you urinate. ? Urinating more often than usual.  ? Blood in your urine.     · You are dizzy or lightheaded, or you feel like you may faint. Watch closely for changes in your health, and be sure to contact your doctor if:    · You are not getting better after 1 day (24 hours). Where can you learn more? Go to https://[employer]. org and sign in to your [employer] account. Enter E271 in the [employer] network box to learn more about \"Abdominal Pain: Care Instructions. \"     If you do not have an account, please click on the \"Sign Up Now\" link. Current as of: July 1, 2021               Content Version: 13.1  © 4259-8250 [employer]. Care instructions adapted under license by [facility] ([facility]). If you have questions about a medical condition or this instruction, always ask your healthcare professional. [place] [address] any warranty or liability for your use of this information.

## 2022-01-12 NOTE — PROGRESS NOTES
Subjective:      Patient ID: Tim Beth is a 64 y.o. female who presents today for:  Chief Complaint   Patient presents with    Other     Pt c/o bowel odor for 3 weeks. HPI  64year old female who presents with complaints of having a foul odor to her stool for the past 3 weeks.  She     Past Medical History:   Diagnosis Date    Anticoagulant long-term use     Carotid artery stenosis     Family history of early CAD 6/28/2016    Fibromyalgia     GERD (gastroesophageal reflux disease)     Hypertension     Neuropathy     Sleep apnea     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 myelopathy or radiculopathy      Past Surgical History:   Procedure Laterality Date    BLADDER SURGERY      stimulator implant    BLADDER SUSPENSION      CHOLECYSTECTOMY      COLONOSCOPY  9/22/2020    COLONOSCOPY WITH BIOPSY AND POLYPECTOMY performed by Hoang Hernandez MD at [address] [place] [address]      UPPER GASTROINTESTINAL ENDOSCOPY N/A 9/22/2020    EGD WITH POLYPECTOMY AND DILATION performed by Hoang Hernandez MD at [facility] Marital status:      Spouse name: Not on file    Number of children: Not on file    Years of education: Not on file    Highest education level: Not on file   Occupational History    Not on file   Tobacco Use    Smoking status: Never Smoker    Smokeless tobacco: Never Used   Vaping Use    Vaping Use: Never used   Substance and Sexual Activity    Alcohol use: No    Drug use: Never    Sexual activity: Not on file   Other Topics Concern    Not on file   Social History Narrative    Not on file     Social Determinants of Health     Financial Resource Strain: Low Risk     Difficulty of Paying Living Expenses: Not hard at all   Food Insecurity: No Food Insecurity    Worried About [address] in the Last Year: Never true    [address] in the Last Year: Never true   Transportation Needs: No Transportation Needs    Lack of Transportation (Medical): No    Lack of Transportation (Non-Medical):  No   Physical Activity:     Days of Exercise per Week: Not on file    Minutes of Exercise per Session: Not on file   Stress:     Feeling of Stress : Not on file   Social Connections:     Frequency of Communication with Friends and Family: Not on file    Frequency of Social Gatherings with Friends and Family: Not on file    Attends Caodaism Services: Not on file    Active Member of Clubs or Organizations: Not on file    Attends Club or Organization Meetings: Not on file    Marital Status: Not on file   Intimate Partner Violence:     Fear of Current or Ex-Partner: Not on file    Emotionally Abused: Not on file    Physically Abused: Not on file    Sexually Abused: Not on file   Housing Stability:     Unable to Pay for Housing in the Last Year: Not on file    Number of [place] in the Last Year: Not on file    Unstable Housing in the Last Year: Not on file     Family History   Problem Relation Age of Onset    Crohn's Disease Sister     Diabetes Sister     Heart Disease Mother     Kidney Disease Mother     Heart Disease Father     Obesity Sister     Heart Disease Sister     Colon Cancer Neg Hx      Allergies   Allergen Reactions    Latex     Amoxicillin-Pot Clavulanate      Other reaction(s): Vomiting    Carbidopa-Levodopa      Other reaction(s): Unknown  Effects the eyes    Iv [Iodides]     Morphine Other (See Comments)     Made her go 'berserk '    Nitrofurantoin Other (See Comments)     Myalgia    Plavix [Clopidogrel Bisulfate]     Pravastatin Other (See Comments)     Myalgia     Current Outpatient Medications   Medication Sig Dispense Refill    propranolol (INDERAL) 10 MG tablet Take 10 mg by mouth daily      metoprolol succinate (TOPROL XL) 50 MG extended release tablet Take 1 tablet by mouth daily 90 tablet 3    pantoprazole (PROTONIX) 40 MG tablet Take 1 tablet by mouth 2 times daily (before meals) 60 tablet 5    prednisoLONE acetate (PRED FORTE) 1 % ophthalmic suspension Place 1 drop into the right eye every 4 hours       aspirin EC 81 MG EC tablet Take 1 tablet by mouth daily 90 tablet 3    meloxicam (MOBIC) 7.5 MG tablet Take 1 tablet by mouth daily (Patient not taking: Reported on 10/29/2021) 30 tablet 3    atorvastatin (LIPITOR) 20 MG tablet Take 1 tablet by mouth daily (Patient not taking: Reported on 1/12/2022) 90 tablet 3    fluticasone (FLONASE) 50 MCG/ACT nasal spray 1 spray by Nasal route 2 times daily (Patient not taking: Reported on 12/9/2021) 1 Bottle 3    primidone (MYSOLINE) 50 MG tablet Take 0.5 tablets by mouth nightly (Patient not taking: Reported on 12/9/2021) 16 tablet 3    Wheat Dextrin (Margaretta Oh) POWD Start with 2 to 3 tsp daily with 8 oz of water, titrate based on stool consistency, titrate down based on bloating & abdominal cramps (Patient not taking: Reported on 12/9/2021) 1 Can 3     No current facility-administered medications for this visit. Review of Systems   Constitutional: Negative for activity change, appetite change, chills, fever and unexpected weight change. HENT: Negative for drooling, ear pain, nosebleeds, sinus pressure and trouble swallowing. Respiratory: Negative for cough, chest tightness and shortness of breath. Cardiovascular: Negative for chest pain and leg swelling. Gastrointestinal: Positive for abdominal pain (intermittent). Negative for diarrhea and vomiting. Endocrine: Negative for polydipsia and polyphagia. Genitourinary: Negative for dysuria, flank pain and frequency. Musculoskeletal: Negative for back pain and myalgias. Skin: Negative for pallor and rash. Neurological: Negative for syncope, weakness and headaches. Hematological: Does not bruise/bleed easily. Psychiatric/Behavioral: Negative for agitation, behavioral problems and confusion.    All other systems reviewed and are negative. Objective: There were no vitals taken for this visit. Physical Exam  Vitals and nursing note reviewed. Constitutional:       General: She is awake. She is not in acute distress. Appearance: Normal appearance. She is well-developed and normal weight. She is not ill-appearing, toxic-appearing or diaphoretic. Comments: No photophobia. No phonophobia. HENT:      Head: Normocephalic and atraumatic. No Conley's sign. Right Ear: External ear normal.      Left Ear: External ear normal.      Nose: Nose normal. No congestion or rhinorrhea. Mouth/Throat:      Mouth: Mucous membranes are moist.      Pharynx: Oropharynx is clear. No oropharyngeal exudate or posterior oropharyngeal erythema. Eyes:      General: No scleral icterus. Right eye: No foreign body or discharge. Left eye: No discharge. Extraocular Movements: Extraocular movements intact. Conjunctiva/sclera: Conjunctivae normal.      Left eye: No exudate. Pupils: Pupils are equal, round, and reactive to light. Neck:      Vascular: No JVD. Trachea: No tracheal deviation. Comments: No meningismus. Cardiovascular:      Rate and Rhythm: Normal rate and regular rhythm. Heart sounds: Normal heart sounds. Heart sounds not distant. No murmur heard. No friction rub. No gallop. Pulmonary:      Effort: Pulmonary effort is normal. No respiratory distress. Breath sounds: Normal breath sounds. No stridor. No wheezing, rhonchi or rales. Chest:      Chest wall: No tenderness. Abdominal:      General: Abdomen is flat. Bowel sounds are normal. There is no distension. Palpations: Abdomen is soft. There is no mass. Tenderness: There is no abdominal tenderness. There is no right CVA tenderness, left CVA tenderness, guarding or rebound. Hernia: No hernia is present. Musculoskeletal:         General: No swelling, tenderness, deformity or signs of injury.  Normal range of motion. Cervical back: Normal range of motion and neck supple. No rigidity. Lymphadenopathy:      Head:      Right side of head: No submental adenopathy. Left side of head: No submental adenopathy. Skin:     General: Skin is warm and dry. Capillary Refill: Capillary refill takes less than 2 seconds. Coloration: Skin is not jaundiced or pale. Findings: No bruising, erythema, lesion or rash. Neurological:      General: No focal deficit present. Mental Status: She is alert and oriented to person, place, and time. Mental status is at baseline. Cranial Nerves: No cranial nerve deficit. Sensory: No sensory deficit. Motor: No weakness. Coordination: Coordination normal.      Deep Tendon Reflexes: Reflexes are normal and symmetric. Psychiatric:         Mood and Affect: Mood normal.         Behavior: Behavior normal. Behavior is cooperative. Thought Content: Thought content normal.         Judgment: Judgment normal.         Assessment:       Diagnosis Orders   1. Generalized abdominal pain  XR ABDOMEN (complete, including decubitus and/or erect views)    Gastrointestinal Panel by DNA    Ambulatory referral to Gastroenterology     No results found for this visit on 01/12/22. Plan:     Assessment & Plan   Windy Soliman was seen today for other. Diagnoses and all orders for this visit:    Generalized abdominal pain  -     XR ABDOMEN (complete, including decubitus and/or erect views); Future  -     Gastrointestinal Panel by DNA;  Future  -     Ambulatory referral to Gastroenterology      Orders Placed This Encounter   Procedures    Gastrointestinal Panel by DNA     Standing Status:   Future     Standing Expiration Date:   1/12/2023    XR ABDOMEN (complete, including decubitus and/or erect views)     Standing Status:   Future     Standing Expiration Date:   1/12/2023     Order Specific Question:   Reason for exam:     Answer:   r/o sbo    Ambulatory referral to Gastroenterology     Referral Priority:   Routine     Referral Type:   Eval and Treat     Referral Reason:   Specialty Services Required     Referred to Provider:   Jonny Noble MD     Requested Specialty:   Gastroenterology     Number of Visits Requested:   1     No orders of the defined types were placed in this encounter. There are no discontinued medications. Return if symptoms worsen or fail to improve. Reviewed with the patient/family: current clinical status & medications. Side effects of the medication prescribed today, as well as treatment plan/rationale and result expectations have been discussed with the patient/family who expresses understanding. Patient will be discharged home in stable condition. Follow up with PCP to evaluate treatment results or return if symptoms worsen or fail to improve. Discussed signs and symptoms which require immediate follow-up in ED/call to 911. Understanding verbalized. I have reviewed the patient's medical history in detail and updated the computerized patient record.     YOMI Lala

## 2022-01-13 DIAGNOSIS — R10.84 GENERALIZED ABDOMINAL PAIN: ICD-10-CM

## 2022-01-14 ENCOUNTER — TELEPHONE (OUTPATIENT)
Dept: FAMILY MEDICINE CLINIC | Age: 77
End: 2022-01-14

## 2022-01-14 DIAGNOSIS — A04.5 CAMPYLOBACTER DIARRHEA: Primary | ICD-10-CM

## 2022-01-14 LAB
ADENOVIRUS F 40 41 PCR: NOT DETECTED
ASTROVIRUS PCR: NOT DETECTED
CAMPYLOBACTER PCR: DETECTED
CLOSTRIDIUM DIFFICILE, PCR: NOT DETECTED
CRYPTOSPORIDIUM PCR: NOT DETECTED
CYCLOSPORA CAYETANENSIS PCR: NOT DETECTED
E COLI ENTEROAGGREGATIVE PCR: NOT DETECTED
E COLI ENTEROPATHOGENIC PCR: NOT DETECTED
E COLI ENTEROTOXIGENIC PCR: NOT DETECTED
E COLI SHIGELLA/ENTEROINVASIVE PCR: NOT DETECTED
ENTAMOEBA HISTOLYTICA PCR: NOT DETECTED
GIARDIA LAMBLIA PCR: NOT DETECTED
NOROVIRUS GI GII PCR: NOT DETECTED
PLESIOMONAS SHIGELLOIDES PCR: NOT DETECTED
ROTAVIRUS A PCR: NOT DETECTED
SALMONELLA PCR: NOT DETECTED
SAPOVIRUS PCR: NOT DETECTED
SHIGA-LIKE TOXIN-PRODUCING E. COLI (STEC) STX1/STX2: NOT DETECTED
VIBRIO CHOLERAE PCR: NOT DETECTED
VIBRIO PCR: NOT DETECTED
YERSINIA ENTEROCOLITICA PCR: NOT DETECTED

## 2022-01-14 RX ORDER — AZITHROMYCIN 500 MG/1
500 TABLET, FILM COATED ORAL DAILY
Qty: 7 TABLET | Refills: 0 | Status: SHIPPED | OUTPATIENT
Start: 2022-01-14 | End: 2022-01-21

## 2022-01-14 NOTE — TELEPHONE ENCOUNTER
Called pt. She feels she is getting worse, reports one loose BM per day. Poor appetite. If she eats it just runs through her. Will start antibiotic and she already has an appt with GI. Encouraged her to push fluids.

## 2022-01-18 ENCOUNTER — OFFICE VISIT (OUTPATIENT)
Dept: GASTROENTEROLOGY | Age: 77
End: 2022-01-18
Payer: MEDICARE

## 2022-01-18 VITALS — WEIGHT: 155 LBS | HEART RATE: 74 BPM | HEIGHT: 65 IN | BODY MASS INDEX: 25.83 KG/M2 | OXYGEN SATURATION: 99 %

## 2022-01-18 DIAGNOSIS — R13.14 PHARYNGOESOPHAGEAL DYSPHAGIA: ICD-10-CM

## 2022-01-18 DIAGNOSIS — K21.9 GASTROESOPHAGEAL REFLUX DISEASE WITHOUT ESOPHAGITIS: ICD-10-CM

## 2022-01-18 DIAGNOSIS — A09 DIARRHEA OF INFECTIOUS ORIGIN: ICD-10-CM

## 2022-01-18 DIAGNOSIS — A04.5 INTESTINAL INFECTION DUE TO CAMPYLOBACTER: Primary | ICD-10-CM

## 2022-01-18 PROCEDURE — G8427 DOCREV CUR MEDS BY ELIG CLIN: HCPCS | Performed by: NURSE PRACTITIONER

## 2022-01-18 PROCEDURE — 99214 OFFICE O/P EST MOD 30 MIN: CPT | Performed by: NURSE PRACTITIONER

## 2022-01-18 PROCEDURE — G8417 CALC BMI ABV UP PARAM F/U: HCPCS | Performed by: NURSE PRACTITIONER

## 2022-01-18 PROCEDURE — 1123F ACP DISCUSS/DSCN MKR DOCD: CPT | Performed by: NURSE PRACTITIONER

## 2022-01-18 PROCEDURE — G8399 PT W/DXA RESULTS DOCUMENT: HCPCS | Performed by: NURSE PRACTITIONER

## 2022-01-18 PROCEDURE — 1036F TOBACCO NON-USER: CPT | Performed by: NURSE PRACTITIONER

## 2022-01-18 PROCEDURE — G8484 FLU IMMUNIZE NO ADMIN: HCPCS | Performed by: NURSE PRACTITIONER

## 2022-01-18 PROCEDURE — 1090F PRES/ABSN URINE INCON ASSESS: CPT | Performed by: NURSE PRACTITIONER

## 2022-01-18 PROCEDURE — 4040F PNEUMOC VAC/ADMIN/RCVD: CPT | Performed by: NURSE PRACTITIONER

## 2022-01-18 ASSESSMENT — ENCOUNTER SYMPTOMS
CONSTIPATION: 0
ABDOMINAL PAIN: 0
VOMITING: 0
VOICE CHANGE: 0
NAUSEA: 0
EYES NEGATIVE: 1
TROUBLE SWALLOWING: 1
CHOKING: 0
DIARRHEA: 1
BLOOD IN STOOL: 0
ABDOMINAL DISTENTION: 0
ANAL BLEEDING: 0
RECTAL PAIN: 0

## 2022-01-18 NOTE — PROGRESS NOTES
Gastroenterology Clinic Follow up Visit    Trudy Harris  [id number]  Chief Complaint   Patient presents with    Follow-up     + Campylobacter infection, dysphagia     HPI: 64 y.o. female following up after last GI clinic on 1/18/21. Interval change: Patient reports having a virtual visit at the walk-in clinic on 1/12/2022 for foul-smelling loose stools x3 weeks associated with abdominal pain, stool positive for Campylobacter and she was placed on Zithromax x7 days (last dose scheduled for 1/22/22). Patient reports significant symptom improvement. States she is only having 2 loose stools per day now, abdominal cramping/pain is subsiding. She does report still feeling weak/fatigued. States she is following a bland/brat type diet. She denies fevers/chills, severe abdominal pain, confusion or dizziness. She denies hematochezia or melena. Patient with longstanding history of GERD, takes pantoprazole 40 mg daily with report of breakthrough symptoms occurring mostly at night 1-2 times per week. She endorses longstanding history of dysphagia (extensive work-up with multiple doctors), had EGD with dilation 9/2020 (finding of mucosal ring at GE junction) with improvement in her symptoms up until about 6 months ago. Patient currently reports daily pharyngoesophageal dysphagia to solids only. States she will alternate liquids to help push the food down. She denies nausea/vomiting, hematemesis or unintentional weight loss. Upon review of medical record, weight is stable despite patient's diarrheal symptoms. Of note, patient has history of recent heart cath on 1/11/2022 which was negative, EF 60%. She takes baby aspirin daily. Denies anticoagulation. HPI from last GI clinic virtual visit with Dr. Gemma Rivera on 1/18/21 summarized below:  Dysphagia has improved significantly but still has to drink water at times to help her swallow.   Patient reports having fecal incontinence now but the stool is formed, she has a history of urinary incontinence and has a later placed which is malfunctioning and she is scheduled to see urologist for replacement of that. No history of any bleeding, no nausea or vomiting. 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 Has seen multiple GI doctors at [facility] [place] in the past, normal Bravo study in 2015, normal manometry performed July 2019. Previous GI work up/Endoscopic investigations:  EGD 9/22/2020: Mucosal ring at GE junction, s/p post dilation and gastric polyps. Histology: Gastric polyps-fundic gland polyps, negative for H. pylori  Colonoscopy 9/22/2020: Diverticulosis of large intestine, polyp ascending colon. Histology: Ileum colon polyp-ileal mucosa with focal mild chronic inflammation and focally increased eosinophils. Random colon biopsies-colonic mucosa with focal mild nonspecific chronic inflammation. Ascending colon polyps-fragment of colonic mucosa with features of serrated polyp. Rare small lymphoid nodule. #Procedure: Esophageal Manometry Examination Date: 07/15/2019    Lower Esophageal Sphincter Region Normal    Landmarks        Proximal LES (from nares)(cm) 42.6        LES length(cm) 0.8 2.7-4.8       Esophageal length (LES-UES centers)(cm) 24.3        Intraabdominal LES length(cm) 0.0        Hiatal hernia? Yes, 1.2     LES Pressures        Pressure faustina.  method eSleeve,IRP        Basal (respiratory mean)(mmHg) -1.8 13-43       Residual (median)(mmHg) -9.0 <15.0        Upper Esophageal Sphincter Normal    Mean basal pressure(mmHg) 26.2     Mean residual pressure(mmHg) 6.8 <12.0        Esophageal Motility Normal    Number of swallows evaluated 10     Hurdland Classification        % failed 0        % weak 0        % ineffective 0        % panesophageal pressurization 0        % premature contraction 0      % fragmented 20        % intact 80        Number of hypercontractile swallows 0         Pharyngeal / UES Motility Normal    No. swallows evaluated 10     Evaluated @ 2.0 & 3.0 above UES        Mean peak pressure(mmHg) 24.9         Interpretation / Findings:   --Normal LESP and complete relaxation.    --Normal peristalsis in all swallows.        Impressions: Normal Esophageal manometry     #EGD: 7/2019  Normal esophagus. Biopsied.                       - Z-line regular, 40 cm from the incisors.                       - LA Grade A reflux esophagitis.                     - 3 cm hiatal hernia.                       - Normal stomach. Biopsied.                       - Normal examined duodenum. Biopsied     #Gastric emptying study: 6/11/2019: Normal     #Modified barium swallow: 3/27/2019: Mild oral and pharyngeal dysphagia     #Small bowel GI series: June 2018: Normal small bowel series with contrast entering the colon in 60 minutes. No inflammation of the small bowel is seen.     #CT abdomen: 4/21/2018: Multiple prominent fluid-filled loops of small bowel with a few scattered air-fluid levels. Distal small bowel is decompressed. Findings may represent that of a partial small bowel obstruction.     #Colonoscopy: 4/3/2017: Sigmoid diverticulosis otherwise normal     #Mesentric artery duplex: 9/20/2016  IMPRESSION: Compared to prior study of 1/5/2016, no change. AORTA: Aorta plaque noted without evidence of hemodynamically significant stenosis at distal  MESENTERIC VESSELS:  Celiac: Dynamic elevated velocities due to median arcuate ligament compression. Hepatic: Patent. Splenic: Patent. Superior mesenteric artery: 0-69% stenosis. No evidence of hemodynamically significant stenosis. Inferior mesenteric artery: 0-69% stenosis.  No evidence of hemodynamically significant stenosis.     #EGD: 5/19/2014: Hiatal hernia otherwise normal  #Colonoscopy: 12/10/2012: Sigmoid diverticulosis, otherwise normal    Review of Systems   Constitutional: Negative for appetite change, fatigue, fever and unexpected weight change. HENT: Positive for trouble swallowing. Negative for voice change. Eyes: Negative. Respiratory: Negative for choking. Cardiovascular: Negative. Gastrointestinal: Positive for diarrhea. Negative for abdominal distention, abdominal pain, anal bleeding, blood in stool, constipation, nausea, rectal pain and vomiting. Endocrine: Negative. Genitourinary: Negative. Musculoskeletal: Negative. Skin: Negative. Allergic/Immunologic: Negative for food allergies. Neurological: Negative. Hematological: Negative. Psychiatric/Behavioral: Negative for agitation, decreased concentration, self-injury, sleep disturbance and suicidal ideas. The patient is not nervous/anxious. Past medical history, past surgical history, medication list, social and familyhistory reviewed    Pulse 74, height 5' 5\" (1.651 m), weight 155 lb (70.3 kg), SpO2 99 %. Physical Exam  Constitutional:       General: She is not in acute distress. Appearance: Normal appearance. She is normal weight. She is not ill-appearing. HENT:      Head: Normocephalic and atraumatic. Eyes:      General: No scleral icterus. Cardiovascular:      Rate and Rhythm: Normal rate and regular rhythm. Pulses: Normal pulses. Pulmonary:      Effort: Pulmonary effort is normal. No respiratory distress. Breath sounds: Normal breath sounds. Abdominal:      General: Bowel sounds are normal. There is no distension. Palpations: Abdomen is soft. There is no mass. Tenderness: There is abdominal tenderness (mild) in the epigastric area. There is no guarding or rebound. Musculoskeletal:         General: Normal range of motion. Lymphadenopathy:      Cervical: No cervical adenopathy. Skin:     General: Skin is warm and dry. Coloration: Skin is not jaundiced.    Neurological:      Mental Status: She is alert and oriented to person, place, and time. Psychiatric:         Mood and Affect: Mood normal.         Behavior: Behavior normal.         Thought Content: Thought content normal.         Judgment: Judgment normal.       Laboratory, Pathology, Radiology reviewed in detail with relevantimportant investigations summarized below:    No results for input(s): WBC, HGB, HCT, MCV, PLT in the last 720 hours. Lab Results   Component Value Date    WBC 7.2 12/09/2021    HGB 13.0 12/09/2021    HCT 39.5 12/09/2021    MCV 92.0 12/09/2021     12/09/2021     Lab Results   Component Value Date    ALT 19 06/30/2021    AST 26 06/30/2021    ALKPHOS 89 06/30/2021    BILITOT 0.5 06/30/2021     Component      Latest Ref Rng & Units 1/13/2022           1:04 PM   Campylobacter PCR      Not Detected DETECTED (A)       XR ABDOMEN (complete, including decubitus and/or erect views)  Result Date: 1/12/2022  Nonobstructive, nonspecific bowel gas pattern     Assessment and Plan:  Peter Handler 64 y.o. female with:     1. Intestinal infection due to campylobacter  2. Diarrhea of infectious origin  -Patient with loose stools/foul odor x3 weeks, stool positive for Campylobacter infection, currently on Zithromax x7 days (last dose 1/22/2022). -Discussed with patient at length Campylobacter infection, natural illness progression, and warning signs to seek further care. 3. Gastroesophageal reflux disease without esophagitis  -GERD symptoms controlled with pantoprazole, patient with occasional breakthrough symptoms 1-2 times per week at night. -- Will continue pantoprazole 40 mg by mouth daily  - Add Pepcid 20 mg by mouth twice daily for breakthrough symptoms  - Advised on the correct dose and timing of the PPI, Preferably 1/2 hour before breakfast. If symptoms are worse at night would recommend taking it half an hour before dinner.  - Pathophysiology and etiology of reflux discussed at length, with help of images.   - Anti-reflux lifestyle modification discussed at length   --Avoid spicy acidic based foods   --Limit coffee, tea, alcohol use   --Limit the amount of food during meal time, avoid gorging   --Avoid bedtime snacks and eat meals 3 to 4 hrs before lying down   --Elevate the head of the bed with blocks    4. Pharyngoesophageal dysphagia  - Longstanding history of dysphagia (extensive work-up w/multiple doctors),   normal Bravo study in 2015, normal manometry performed July 2019, normal gastric emptying study 2019. Multiple EGDs. Last EGD 9/2020 revealing mucosal ring at GE junction, dilated with resolution of symptom up until 6 months ago. Patient now with recurrent/daily  pharyngoesophageal dysphagia to solids only. -EGD with possible dilation ordered as patient had good response with previous dilation in 2020.  -Hold baby aspirin 5 days prior to EGD. Return for Follow-up after endoscopy.     SADAF Nolasco - CNP   Staff Gastroenterology Nurse Practitioner  [facility]

## 2022-01-20 ENCOUNTER — TELEPHONE (OUTPATIENT)
Dept: GASTROENTEROLOGY | Age: 77
End: 2022-01-20

## 2022-01-20 NOTE — TELEPHONE ENCOUNTER
----- Message from SADAF Gibson CNP sent at 1/19/2022  9:52 AM EST -----  Please call patient and schedule EGD with possible dilation with Dr. Mae Haynes at her earliest convenience. Remind patient to hold her baby aspirin 5 days prior to her procedure. Please schedule follow-up appointment at least 2 weeks after EGD to assess response to dilation. Thank you.

## 2022-01-20 NOTE — TELEPHONE ENCOUNTER
Patient is scheduled for EGD on 1/27/22. Follow up is scheduled for 2/10/22. Patient is aware to hold aspirin 5 days prior to procedure.

## 2022-01-27 ENCOUNTER — HOSPITAL ENCOUNTER (OUTPATIENT)
Age: 77
Setting detail: OUTPATIENT SURGERY
Discharge: HOME | End: 2022-01-27
Attending: INTERNAL MEDICINE | Admitting: INTERNAL MEDICINE
Payer: MEDICARE

## 2022-01-27 ENCOUNTER — ANESTHESIA (OUTPATIENT)
Dept: ENDOSCOPY | Age: 77
End: 2022-01-27
Payer: MEDICARE

## 2022-01-27 ENCOUNTER — ANESTHESIA EVENT (OUTPATIENT)
Dept: ENDOSCOPY | Age: 77
End: 2022-01-27
Payer: MEDICARE

## 2022-01-27 ENCOUNTER — ANCILLARY PROCEDURE (OUTPATIENT)
Dept: ENDOSCOPY | Age: 77
End: 2022-01-27
Attending: INTERNAL MEDICINE
Payer: MEDICARE

## 2022-01-27 VITALS
BODY MASS INDEX: 25.83 KG/M2 | HEIGHT: 65 IN | WEIGHT: 155 LBS | RESPIRATION RATE: 20 BRPM | OXYGEN SATURATION: 98 % | DIASTOLIC BLOOD PRESSURE: 82 MMHG | HEART RATE: 94 BPM | TEMPERATURE: 98.3 F | SYSTOLIC BLOOD PRESSURE: 136 MMHG

## 2022-01-27 VITALS
SYSTOLIC BLOOD PRESSURE: 176 MMHG | DIASTOLIC BLOOD PRESSURE: 86 MMHG | RESPIRATION RATE: 9 BRPM | OXYGEN SATURATION: 98 %

## 2022-01-27 PROCEDURE — 2709999900 HC NON-CHARGEABLE SUPPLY: Performed by: INTERNAL MEDICINE

## 2022-01-27 PROCEDURE — 3700000000 HC ANESTHESIA ATTENDED CARE: Performed by: INTERNAL MEDICINE

## 2022-01-27 PROCEDURE — 6360000002 HC RX W HCPCS: Performed by: REGISTERED NURSE

## 2022-01-27 PROCEDURE — 7100000011 HC PHASE II RECOVERY - ADDTL 15 MIN: Performed by: INTERNAL MEDICINE

## 2022-01-27 PROCEDURE — 3700000001 HC ADD 15 MINUTES (ANESTHESIA): Performed by: INTERNAL MEDICINE

## 2022-01-27 PROCEDURE — 2580000003 HC RX 258: Performed by: INTERNAL MEDICINE

## 2022-01-27 PROCEDURE — 2580000003 HC RX 258

## 2022-01-27 PROCEDURE — 3609017100 HC EGD: Performed by: INTERNAL MEDICINE

## 2022-01-27 PROCEDURE — 7100000010 HC PHASE II RECOVERY - FIRST 15 MIN: Performed by: INTERNAL MEDICINE

## 2022-01-27 PROCEDURE — 43248 EGD GUIDE WIRE INSERTION: CPT | Performed by: INTERNAL MEDICINE

## 2022-01-27 RX ORDER — 0.9 % SODIUM CHLORIDE 0.9 %
500 INTRAVENOUS SOLUTION INTRAVENOUS ONCE
Status: COMPLETED | OUTPATIENT
Start: 2022-01-27 | End: 2022-01-27

## 2022-01-27 RX ORDER — MAGNESIUM HYDROXIDE 1200 MG/15ML
LIQUID ORAL PRN
Status: DISCONTINUED | OUTPATIENT
Start: 2022-01-27 | End: 2022-01-27 | Stop reason: ALTCHOICE

## 2022-01-27 RX ORDER — PROPOFOL 10 MG/ML
INJECTION, EMULSION INTRAVENOUS PRN
Status: DISCONTINUED | OUTPATIENT
Start: 2022-01-27 | End: 2022-01-27 | Stop reason: SDUPTHER

## 2022-01-27 RX ORDER — SIMETHICONE 20 MG/.3ML
EMULSION ORAL
Status: DISCONTINUED
Start: 2022-01-27 | End: 2022-01-27 | Stop reason: HOSPADM

## 2022-01-27 RX ORDER — SODIUM CHLORIDE 9 MG/ML
INJECTION, SOLUTION INTRAVENOUS
Status: DISCONTINUED
Start: 2022-01-27 | End: 2022-01-27 | Stop reason: SDUPTHER

## 2022-01-27 RX ADMIN — PROPOFOL 100 MG: 10 INJECTION, EMULSION INTRAVENOUS at 07:45

## 2022-01-27 RX ADMIN — PROPOFOL 200 MG: 10 INJECTION, EMULSION INTRAVENOUS at 07:47

## 2022-01-27 RX ADMIN — PROPOFOL 50 MG: 10 INJECTION, EMULSION INTRAVENOUS at 07:54

## 2022-01-27 RX ADMIN — Medication 500 ML: at 06:57

## 2022-01-27 RX ADMIN — SODIUM CHLORIDE 500 ML: 9 INJECTION, SOLUTION INTRAVENOUS at 06:57

## 2022-01-27 ASSESSMENT — PULMONARY FUNCTION TESTS
PIF_VALUE: 0
PIF_VALUE: 1
PIF_VALUE: 0
PIF_VALUE: 1
PIF_VALUE: 0

## 2022-01-27 NOTE — ANESTHESIA POSTPROCEDURE EVALUATION
Department of Anesthesiology  Postprocedure Note    Patient: Tim Beth  MRN: [id number]  YOB: 1945  Date of evaluation: 1/27/2022  Time:  7:57 AM     Procedure Summary     Date: 01/27/22 Room / Location: [address] [place] [address] Lenin Wu    Anesthesia Start: 7701 Anesthesia Stop: 1912    Procedure: EGD ESOPHAGOGASTRODUODENOSCOPY (N/A ) Diagnosis: (pharyngoesophageal dysphagia, GERD  R13.14, K21.9)    Surgeons: Dayne Elizalde MD Responsible Provider: SADAF Bocanegra CRNA    Anesthesia Type: MAC ASA Status: 3          Anesthesia Type: MAC    Madeline Phase I: Madeline Score: 10    Madeline Phase II:      Last vitals: Reviewed and per EMR flowsheets.        Anesthesia Post Evaluation    Patient location during evaluation: bedside  Patient participation: complete - patient participated  Level of consciousness: awake and awake and alert  Pain score: 0  Airway patency: patent  Nausea & Vomiting: no nausea and no vomiting  Complications: no  Cardiovascular status: blood pressure returned to baseline and hemodynamically stable  Respiratory status: acceptable  Hydration status: euvolemic

## 2022-01-27 NOTE — ANESTHESIA PRE PROCEDURE
Department of Anesthesiology  Preprocedure Note       Name:  Simone Fry   Age:  64 y.o.  :  1945                                          MRN:  [id number]         Date:  2022      Surgeon: Nicole Chamberlain):  Mariusz Burgess MD    Procedure: Procedure(s):  EGD ESOPHAGOGASTRODUODENOSCOPY    Medications prior to admission:   Prior to Admission medications    Medication Sig Start Date End Date Taking? Authorizing Provider   propranolol (INDERAL) 10 MG tablet Take 10 mg by mouth daily 21  Yes Historical Provider, MD   metoprolol succinate (TOPROL XL) 50 MG extended release tablet Take 1 tablet by mouth daily 21  Yes Deana Brandt MD   pantoprazole (PROTONIX) 40 MG tablet Take 1 tablet by mouth 2 times daily (before meals) 3/29/21  Yes Josephine Denis MD   aspirin EC 81 MG EC tablet Take 1 tablet by mouth daily 20  Yes Deana Brandt MD       Current medications:    Current Facility-Administered Medications   Medication Dose Route Frequency Provider Last Rate Last Admin    0.9 % sodium chloride bolus  500 mL IntraVENous Once Mariusz Burgess  mL/hr at 22 0657 500 mL at 22 0657    simethicone (MYLICON) 40 VAMSI/1.1WN drops             sterile water for irrigation    PRN Mariusz Burgess MD   1,000 mL at 22 0720       Allergies:     Allergies   Allergen Reactions    Latex     Amoxicillin-Pot Clavulanate      Other reaction(s): Vomiting    Carbidopa-Levodopa      Other reaction(s): Unknown  Effects the eyes    Iv [Iodides]     Morphine Other (See Comments)     Made her go 'berserk '    Nitrofurantoin Other (See Comments)     Myalgia    Plavix [Clopidogrel Bisulfate]     Pravastatin Other (See Comments)     Myalgia       Problem List:    Patient Active Problem List   Diagnosis Code    Dysphagia R13.10    Arterial fibromuscular dysplasia (HCC) I77.3    Fibromyalgia M79.7    Gastroesophageal reflux disease K21.9    Obstructive sleep apnea syndrome G47.33    Peripheral neuropathy ([address] G62.9    Varicose veins of lower extremity I83.90    Chest pain R07.9    BEATRICE (obstructive sleep apnea) G47.33    Stenosis of right carotid artery I65.21    Dyslipidemia E78.5    BEATRICE on CPAP G47.33, Z99.89    Essential hypertension I10    Dizziness of unknown etiology R42    Gastric polyp K31.7    Esophageal stricture K22.2    Acute cystitis with hematuria N30.01    Acute bacterial sinusitis J01.90, B96.89    Frequency of urination R35.0    Sensorineural hearing loss (SNHL) of both ears H90.3    Generalized abdominal pain R10.84       Past Medical History:        Diagnosis Date    Anticoagulant long-term use     Blood clot in abdominal vein     blood clot in mesenteric vein    Carotid artery stenosis     Family history of early CAD 6/28/2016    Fibromyalgia     GERD (gastroesophageal reflux disease)     Hypertension     Neuropathy     Sleep apnea     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 myelopathy or radiculopathy        Past Surgical History:        Procedure Laterality Date    BLADDER SURGERY      stimulator implant    BLADDER SUSPENSION      CHOLECYSTECTOMY      COLONOSCOPY  9/22/2020    COLONOSCOPY WITH BIOPSY AND POLYPECTOMY performed by Syed Killian MD at [address] [place] [address], DIAGNOSTIC      HYSTERECTOMY      INNER EAR SURGERY      UPPER GASTROINTESTINAL ENDOSCOPY N/A 9/22/2020    EGD WITH POLYPECTOMY AND DILATION performed by Syed Killian MD at [facility] History:    Social History     Tobacco Use    Smoking status: Never Smoker    Smokeless tobacco: Never Used   Substance Use Topics    Alcohol use:  No                                Counseling given: Not Answered      Vital Signs (Current):   Vitals:    01/27/22 0655   BP: (!) 157/93   Pulse: 79   Resp: 16   Temp: 36.8 °C (98.3 °F)   TempSrc: Temporal   SpO2: 95%   Weight: 155 lb (70.3 kg)   Height: 5' 5\" (1.651 m) BP Readings from Last 3 Encounters:   01/27/22 (!) 157/93   01/11/22 (!) 141/79   12/09/21 126/88       NPO Status: Time of last liquid consumption: 1800                        Time of last solid consumption: 1800                        Date of last liquid consumption: 01/26/22                        Date of last solid food consumption: 01/26/22    BMI:   Wt Readings from Last 3 Encounters:   01/27/22 155 lb (70.3 kg)   01/18/22 155 lb (70.3 kg)   01/11/22 155 lb (70.3 kg)     Body mass index is 25.79 kg/m². CBC:   Lab Results   Component Value Date    WBC 7.2 12/09/2021    RBC 4.30 12/09/2021    RBC 4.35 06/01/2012    HGB 13.0 12/09/2021    HCT 39.5 12/09/2021    MCV 92.0 12/09/2021    RDW 14.4 12/09/2021     12/09/2021       CMP:   Lab Results   Component Value Date     12/09/2021    K 4.1 12/09/2021     12/09/2021    CO2 22 12/09/2021    BUN 15 12/09/2021    CREATININE 0.86 12/09/2021    GFRAA >60.0 12/09/2021    LABGLOM >60.0 12/09/2021    GLUCOSE 88 12/09/2021    GLUCOSE 139 06/01/2012    PROT 7.1 06/30/2021    CALCIUM 9.2 12/09/2021    BILITOT 0.5 06/30/2021    ALKPHOS 89 06/30/2021    AST 26 06/30/2021    ALT 19 06/30/2021       POC Tests: No results for input(s): POCGLU, POCNA, POCK, POCCL, POCBUN, POCHEMO, POCHCT in the last 72 hours.     Coags:   Lab Results   Component Value Date    PROTIME 13.0 12/26/2019    PROTIME 16.7 06/01/2012    INR 1.0 12/26/2019    APTT 30.2 12/26/2019       HCG (If Applicable): No results found for: PREGTESTUR, PREGSERUM, HCG, HCGQUANT     ABGs: No results found for: PHART, PO2ART, YFB3PPO, KEM4GRY, BEART, R3BKNIVG     Type & Screen (If Applicable):  No results found for: LABABO, LABRH    Drug/Infectious Status (If Applicable):  No results found for: HIV, HEPCAB    COVID-19 Screening (If Applicable):   Lab Results   Component Value Date    COVID19 Not Detected 09/15/2020           Anesthesia Evaluation  Patient summary reviewed and Nursing notes reviewed no history of anesthetic complications:   Airway: Mallampati: II  TM distance: >3 FB   Neck ROM: full  Mouth opening: > = 3 FB Dental: normal exam         Pulmonary:normal exam    (+) sleep apnea:                             Cardiovascular:  Exercise tolerance: good (>4 METS),   (+) hypertension: no interval change,       ECG reviewed               Beta Blocker:  Not on Beta Blocker         Neuro/Psych:   (+) neuromuscular disease:,             GI/Hepatic/Renal:   (+) GERD: no interval change,           Endo/Other: Negative Endo/Other ROS             Pt had PAT visit. Abdominal:             Vascular: negative vascular ROS. Other Findings:             Anesthesia Plan      MAC     ASA 3       Induction: intravenous.                           Sydell Goodell, APRN - CRNA   1/27/2022

## 2022-01-27 NOTE — H&P
Patient Name: Iris Swann  : 1945  MRN: [id number]  DATE: 22      ENDOSCOPY  History and Physical    Procedure:    [] Diagnostic Colonoscopy       [] Screening Colonoscopy  [x] EGD      [] ERCP      [] EUS       [] Other    [x] Previous office notes/History and Physical reviewed from the patients chart. Please see EMR for further details of HPI. I have examined the patient's status immediately prior to the procedure and:      Indications/HPI:    []Abdominal Pain   []Cancer- GI/Lung  []Fhx of colon CA  []History of Polyps   []Shipleys   []Melena  []Abnormal Imaging   [x]Dysphagia    []Persistent Pneumonia  []Anemia   []Food Impaction  []History of Polyps  []GI Bleed   []Pulmonary nodule/Mass  []Change in bowel habits  [x]Heartburn/Reflux  []Rectal Bleed (BRBPR)  []Chest Pain - Non Cardiac  []Heme (+) Stool  []Ulcers  []Constipation   []Hemoptysis   []Varices  []Diarrhea   []Hypoxemia  []Nausea/Vomiting   []Screening   []Crohns/Colitis  []Other:    Anesthesia:   [x] MAC [] Moderate Sedation   [] General   [] None     ROS: 12 pt Review of Symptoms was negative unless mentioned above    Medications:   Prior to Admission medications    Medication Sig Start Date End Date Taking? Authorizing Provider   propranolol (INDERAL) 10 MG tablet Take 10 mg by mouth daily 21  Yes Historical Provider, MD   metoprolol succinate (TOPROL XL) 50 MG extended release tablet Take 1 tablet by mouth daily 21  Yes Aspen Alvarado MD   pantoprazole (PROTONIX) 40 MG tablet Take 1 tablet by mouth 2 times daily (before meals) 3/29/21  Yes Randal Rodríguez MD   aspirin EC 81 MG EC tablet Take 1 tablet by mouth daily 20  Yes Aspen Alvarado MD     Allergies:    Allergies   Allergen Reactions    Latex     Amoxicillin-Pot Clavulanate      Other reaction(s): Vomiting    Carbidopa-Levodopa      Other reaction(s): Unknown  Effects the eyes    Iv [Iodides]     Morphine Other (See Comments)     Made her go 'berserk '    Nitrofurantoin Other (See Comments)     Myalgia    Plavix [Clopidogrel Bisulfate]     Pravastatin Other (See Comments)     Myalgia      History of allergic reaction to anesthesia:  No  Past Medical History:  Past Medical History:   Diagnosis Date    Anticoagulant long-term use     Blood clot in abdominal vein     blood clot in mesenteric vein    Carotid artery stenosis     Family history of early CAD 6/28/2016    Fibromyalgia     GERD (gastroesophageal reflux disease)     Hypertension     Neuropathy     Sleep apnea     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 myelopathy or radiculopathy      Past Surgical History:  Past Surgical History:   Procedure Laterality Date    BLADDER SURGERY      stimulator implant    BLADDER SUSPENSION      CHOLECYSTECTOMY      COLONOSCOPY  9/22/2020    COLONOSCOPY WITH BIOPSY AND POLYPECTOMY performed by Maria Eugenia Vergara MD at [address] [place] [address], DIAGNOSTIC      HYSTERECTOMY      INNER EAR SURGERY      UPPER GASTROINTESTINAL ENDOSCOPY N/A 9/22/2020    EGD WITH POLYPECTOMY AND DILATION performed by Maria Eugenia Vergara MD at [address] [place] [address] History:  Social History     Tobacco Use    Smoking status: Never Smoker    Smokeless tobacco: Never Used   Vaping Use    Vaping Use: Never used   Substance Use Topics    Alcohol use: No    Drug use: Never     Vital Signs:   Vitals:    01/27/22 0655   BP: (!) 157/93   Pulse: 79   Resp: 16   Temp: 98.3 °F (36.8 °C)   SpO2: 95%       Physical Exam:  Cardiac:  [x]WNL []Comments:  Pulmonary:  [x]WNL []Comments:   Neuro/Mental Status:  [x]WNL []Comments:  Abdominal:  [x]WNL []Comments:  Other:   []WNL []Comments:    Informed Consent:  The risks and benefits of the procedure have been discussed with either the patient or if they cannot consent, their representative. Assessment:  Patient examined and appropriate for planned sedation and procedure.      Plan:  Proceed with planned sedation and procedure as above.    The patient was counseled at length about risks of radhames COVID-19 in the perioperative and any recovery window from the procedure. The patient was made aware that radhames COVID-19 may worsen their prognosis for recovery from their procedure and lend to a higher morbidity and-all mortality risk. The patient was given the option of postponing the procedure all material risks, benefits, and alternatives were discussed. The patient does wish to proceed with the procedure at this time.     Ramesh Edmonds MD  7:56 AM

## 2022-02-10 ENCOUNTER — OFFICE VISIT (OUTPATIENT)
Dept: GASTROENTEROLOGY | Age: 77
End: 2022-02-10
Payer: MEDICARE

## 2022-02-10 VITALS — WEIGHT: 157 LBS | BODY MASS INDEX: 26.16 KG/M2 | HEIGHT: 65 IN

## 2022-02-10 DIAGNOSIS — R13.14 PHARYNGOESOPHAGEAL DYSPHAGIA: Primary | ICD-10-CM

## 2022-02-10 DIAGNOSIS — A09 DIARRHEA OF INFECTIOUS ORIGIN: ICD-10-CM

## 2022-02-10 DIAGNOSIS — K21.9 GASTROESOPHAGEAL REFLUX DISEASE WITHOUT ESOPHAGITIS: ICD-10-CM

## 2022-02-10 DIAGNOSIS — A04.5 INTESTINAL INFECTION DUE TO CAMPYLOBACTER: ICD-10-CM

## 2022-02-10 PROCEDURE — 99214 OFFICE O/P EST MOD 30 MIN: CPT | Performed by: NURSE PRACTITIONER

## 2022-02-10 PROCEDURE — 1036F TOBACCO NON-USER: CPT | Performed by: NURSE PRACTITIONER

## 2022-02-10 PROCEDURE — 4040F PNEUMOC VAC/ADMIN/RCVD: CPT | Performed by: NURSE PRACTITIONER

## 2022-02-10 PROCEDURE — G8427 DOCREV CUR MEDS BY ELIG CLIN: HCPCS | Performed by: NURSE PRACTITIONER

## 2022-02-10 PROCEDURE — G8484 FLU IMMUNIZE NO ADMIN: HCPCS | Performed by: NURSE PRACTITIONER

## 2022-02-10 PROCEDURE — 1090F PRES/ABSN URINE INCON ASSESS: CPT | Performed by: NURSE PRACTITIONER

## 2022-02-10 PROCEDURE — G8399 PT W/DXA RESULTS DOCUMENT: HCPCS | Performed by: NURSE PRACTITIONER

## 2022-02-10 PROCEDURE — G8417 CALC BMI ABV UP PARAM F/U: HCPCS | Performed by: NURSE PRACTITIONER

## 2022-02-10 PROCEDURE — 1123F ACP DISCUSS/DSCN MKR DOCD: CPT | Performed by: NURSE PRACTITIONER

## 2022-02-10 RX ORDER — ATORVASTATIN CALCIUM 20 MG/1
20 TABLET, FILM COATED ORAL DAILY
COMMUNITY
End: 2022-06-06

## 2022-02-10 RX ORDER — FAMOTIDINE 20 MG/1
20 TABLET, FILM COATED ORAL 2 TIMES DAILY PRN
Qty: 60 TABLET | Refills: 3 | Status: SHIPPED | OUTPATIENT
Start: 2022-02-10 | End: 2022-10-21

## 2022-02-10 RX ORDER — PANTOPRAZOLE SODIUM 40 MG/1
40 TABLET, DELAYED RELEASE ORAL
Qty: 90 TABLET | Refills: 1 | Status: SHIPPED | OUTPATIENT
Start: 2022-02-10 | End: 2022-09-01

## 2022-02-10 NOTE — PROGRESS NOTES
Gastroenterology Clinic Follow up Visit    Anai España  [id number]  Chief Complaint   Patient presents with    Follow-up     Patient is here today for a follow up after having an EGD 1/27/2022. Patient reports feeling \"OK\" since the procedure. She states that she does still have a sore throat and says that swallowing is difficult     HPI: 64 y.o. female following up after last GI clinic on 1/18/2022. Recent Campylobacter infection, s/p antibiotic therapy. S/p recent EGD with dilation. Interval change: Patient continues to report daily pharyngoesophageal dysphagia to solids, has to use liquids to get her food down. Patient with history of extensive dysphagia investigation in the past (summarized below). Of note, patient does report modified barium swallow February 2020 with recommendations to follow with speech therapy, however patient did not continue as COVID-19  She reports GERD symptoms well controlled with taking pantoprazole twice daily, however she will have symptoms at night depending on what she eats. She also reports for the last 2 to 3 days episodes of diarrhea. Prior to this she was having a bowel movement daily after finishing her antibiotic. She reports not taking a fiber supplement or probiotic. She does endorse fatigue, feels tired all the time. She attributes this to having to get up 2-4 times per night to go to the bathroom, has bladder stimulator, follows with urology with next appointment in a week. Patient reports [facility], Dr. Horace Burnham, for movement disorder. States she has had 2 other doctors at [facility] tell her she has Parkinson's disease and other doctors states she has essential tremors. She denies history of stroke, however has trouble getting words out at times. She reports her weight is stable. Patient denies nausea/vomiting, hematemesis, abdominal pain, hematochezia, melena or weight loss.     Patient denies chest pain, which is malfunctioning and she is scheduled to see urologist for replacement of that.  No history of any bleeding, no nausea or vomiting.  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 Has seen multiple GI doctors at [facility] in the past, normal Bravo study in 2015, normal manometry performed July 2019.     Previous GI work up/Endoscopic investigations:  EGD 1/27/2022: 2 cm hiatal hernia, tortuous esophagus, empiric dilation with 20 mm savory dilator, post dilation reveals mild mucosal tearing at the upper esophageal sphincter area. #EGD 9/22/2020: Mucosal ring at GE junction, s/p post dilation and gastric polyps. Histology: Gastric polyps-fundic gland polyps, negative for H. pylori    #Colonoscopy 9/22/2020: Diverticulosis of large intestine, polyp ascending colon. Histology: Ileum colon polyp-ileal mucosa with focal mild chronic inflammation and focally increased eosinophils. Random colon biopsies-colonic mucosa with focal mild nonspecific chronic inflammation. Ascending colon polyps-fragment of colonic mucosa with features of serrated polyp. Rare small lymphoid nodule. #Modified barium swallow 2/18/2020: Mild oral and moderate pharyngeal dysphagia.   SUMMARY OF EVALUATION  DYSPHAGIA DIAGNOSIS:  Mild-moderate oral phase dysphagia and moderate pharyngeal phase dysphagia     DIET RECOMMENDATIONS: minced and moist consistencies with thin liquids and NO MIXED CONSISTENCIES      FEEDING RECOMMENDATIONS:   No assistance required  Multiple swallows  Feed in upright position  Small bites/sips  Remain upright after meals  Eat/Feed at a slow rate     THERAPY RECOMMENDATIONS:   Therapy is recommended to:  Assess tolerance of recommended diet  Improve tongue base retraction  Improve laryngeal strength and range of motion      Pt would also benefit from a Speech-Language Evaluation in order to assess for dysphonia and treat, if indicated. Pt was very difficult to hear during conversation secondary to low vocal volume. Pt may be a good candidate for [facility] LOUD Program at the outpatient level.      #Procedure: Esophageal Manometry Examination Date: 07/15/2019    Lower Esophageal Sphincter Region Normal    Landmarks        Proximal LES (from nares)(cm) 42.6        LES length(cm) 0.8 2.7-4.8       Esophageal length (LES-UES centers)(cm) 24.3        Intraabdominal LES length(cm) 0.0        Hiatal hernia? Yes, 1.2     LES Pressures        Pressure faustina. method eSleeve,IRP        Basal (respiratory mean)(mmHg) -1.8 13-43       Residual (median)(mmHg) -9.0 <15.0        Upper Esophageal Sphincter Normal    Mean basal pressure(mmHg) 26.2     Mean residual pressure(mmHg) 6.8 <12.0        Esophageal Motility Normal    Number of swallows evaluated 10     [place] Classification        % failed 0        % weak 0        % ineffective 0        % panesophageal pressurization 0        % premature contraction 0        % fragmented 20        % intact 80        Number of hypercontractile swallows 0         Pharyngeal / UES Motility Normal    No. swallows evaluated 10     Evaluated @ 2.0 & 3.0 above UES        Mean peak pressure(mmHg) 24.9         Interpretation / Findings:   --Normal LESP and complete relaxation.    --Normal peristalsis in all swallows.        Impressions: Normal Esophageal manometry     #EGD: 7/2019  Normal esophagus. Biopsied.                      - Z-line regular, 40 cm from the incisors.                       - LA Grade A reflux esophagitis.                      - 3 cm hiatal hernia.                       - Normal stomach. Biopsied.                      - Normal examined duodenum.  Biopsied     #Gastric emptying study: 6/11/2019: Normal     #Modified barium swallow: 3/27/2019: Mild oral and pharyngeal dysphagia     #Small bowel GI series: June 2018: Normal small bowel series with contrast entering the colon in 60 minutes.  No inflammation of the small bowel is seen.     #CT abdomen: 4/21/2018: Multiple prominent fluid-filled loops of small bowel with a few scattered air-fluid levels.  Distal small bowel is decompressed.  Findings may represent that of a partial small bowel obstruction.     #Colonoscopy: 4/3/2017: Sigmoid diverticulosis otherwise normal     #Mesentric artery duplex: 9/20/2016  IMPRESSION: Compared to prior study of 1/5/2016, no change. AORTA: Aorta plaque noted without evidence of hemodynamically significant stenosis at distal  MESENTERIC VESSELS:  Celiac: Dynamic elevated velocities due to median arcuate ligament compression. Hepatic: Patent. Splenic: Patent. Superior mesenteric artery: 0-69% stenosis. No evidence of hemodynamically significant stenosis. Inferior mesenteric artery: 0-69% stenosis. No evidence of hemodynamically significant stenosis.     #EGD: 5/19/2014: Hiatal hernia otherwise normal  #Colonoscopy: 12/10/2012: Sigmoid diverticulosis, otherwise normal    Review of Systems   All other systems reviewed and are negative. Past medical history, past surgical history, medication list, social and familyhistory reviewed    Height 5' 5\" (1.651 m), weight 157 lb (71.2 kg). Physical Exam  Constitutional:       General: She is not in acute distress. Appearance: Normal appearance. She is normal weight. She is not ill-appearing. HENT:      Head: Normocephalic and atraumatic. Eyes:      General: No scleral icterus. Cardiovascular:      Rate and Rhythm: Normal rate and regular rhythm. Pulses: Normal pulses. Pulmonary:      Effort: Pulmonary effort is normal. No respiratory distress. Breath sounds: Normal breath sounds. Abdominal:      General: Bowel sounds are normal. There is no distension. Palpations: Abdomen is soft. There is no mass. Tenderness: There is no abdominal tenderness.  There is no guarding or rebound. Musculoskeletal:         General: Normal range of motion. Lymphadenopathy:      Cervical: No cervical adenopathy. Skin:     General: Skin is warm and dry. Coloration: Skin is not jaundiced. Neurological:      Mental Status: She is alert and oriented to person, place, and time. Psychiatric:         Mood and Affect: Mood normal.         Behavior: Behavior normal.         Thought Content: Thought content normal.         Judgment: Judgment normal.       Laboratory, Pathology, Radiology reviewed in detail with relevantimportant investigations summarized below:    Lab Results   Component Value Date    WBC 7.2 12/09/2021    HGB 13.0 12/09/2021    HCT 39.5 12/09/2021    MCV 92.0 12/09/2021     12/09/2021     Lab Results   Component Value Date    ALT 19 06/30/2021    AST 26 06/30/2021    ALKPHOS 89 06/30/2021    BILITOT 0.5 06/30/2021     XR ABDOMEN (complete, including decubitus and/or erect views)  Result Date: 1/12/2022  Nonobstructive, nonspecific bowel gas pattern    Assessment and Plan:  Reena Jain 64 y.o. female with:     1. Intestinal infection due to campylobacter  2. Diarrhea of infectious origin  -Patient with resolution of diarrheal symptoms up until 3 days ago. S/p antibiotic therapy.  -Trial of Metamucil 1 spoonful daily in a glass of water.  -Start probiotic by mouth daily  -Discussed with patient to call if diarrheal symptoms do not improve    3. Gastroesophageal reflux disease without esophagitis  -Continue pantoprazole 40 mg by mouth daily and Pepcid 20 mg by mouth twice daily for breakthrough symptoms as needed  -Continue antireflux measures    4. Pharyngoesophageal dysphagia  -Patient with continued dysphagia despite recent EGD with empiric dilation. Suspect patient's symptoms in relation to known movement disorder. Prior history of modified barium swallow revealing moderate pharyngeal dysphagia. Patient was following with speech therapy up until 2/2020.   Patient did not follow-up after COVID-19 pandemic.  -Recommend ambulatory referral to Speech Therapy for continued evaluation/treatment of her dysphagia. Return in about 4 months (around 6/10/2022), or if symptoms worsen or fail to improve.     SADAF Castellanos - CNP   Staff Gastroenterology Nurse Practitioner  [facility]

## 2022-02-10 NOTE — PATIENT INSTRUCTIONS
- Start fiber (ex. Metamucil) 1/2 to 1 tablespoon daily.  The dose should then be slowly titrated up based on response to treatment.    -Start a probiotic daily     -Speech therapy    -continue to follow up with neurology    =Decrease protonix to 1 time per day before dinner    -Add pepcid as needed 2 times per day

## 2022-02-14 ENCOUNTER — OFFICE VISIT (OUTPATIENT)
Dept: CARDIOLOGY CLINIC | Age: 77
End: 2022-02-14
Payer: MEDICARE

## 2022-02-14 VITALS
HEART RATE: 62 BPM | OXYGEN SATURATION: 96 % | RESPIRATION RATE: 15 BRPM | SYSTOLIC BLOOD PRESSURE: 118 MMHG | BODY MASS INDEX: 27.06 KG/M2 | HEIGHT: 65 IN | DIASTOLIC BLOOD PRESSURE: 80 MMHG | WEIGHT: 162.4 LBS

## 2022-02-14 DIAGNOSIS — R07.9 CHEST PAIN, UNSPECIFIED TYPE: ICD-10-CM

## 2022-02-14 DIAGNOSIS — I10 ESSENTIAL HYPERTENSION: Primary | ICD-10-CM

## 2022-02-14 DIAGNOSIS — R09.89 BILATERAL CAROTID BRUITS: ICD-10-CM

## 2022-02-14 DIAGNOSIS — G47.33 OBSTRUCTIVE SLEEP APNEA SYNDROME: ICD-10-CM

## 2022-02-14 DIAGNOSIS — R94.39 ABNORMAL NUCLEAR STRESS TEST: ICD-10-CM

## 2022-02-14 PROCEDURE — 1123F ACP DISCUSS/DSCN MKR DOCD: CPT | Performed by: INTERNAL MEDICINE

## 2022-02-14 PROCEDURE — G8417 CALC BMI ABV UP PARAM F/U: HCPCS | Performed by: INTERNAL MEDICINE

## 2022-02-14 PROCEDURE — 4040F PNEUMOC VAC/ADMIN/RCVD: CPT | Performed by: INTERNAL MEDICINE

## 2022-02-14 PROCEDURE — 99214 OFFICE O/P EST MOD 30 MIN: CPT | Performed by: INTERNAL MEDICINE

## 2022-02-14 PROCEDURE — G8427 DOCREV CUR MEDS BY ELIG CLIN: HCPCS | Performed by: INTERNAL MEDICINE

## 2022-02-14 PROCEDURE — G8484 FLU IMMUNIZE NO ADMIN: HCPCS | Performed by: INTERNAL MEDICINE

## 2022-02-14 PROCEDURE — G8399 PT W/DXA RESULTS DOCUMENT: HCPCS | Performed by: INTERNAL MEDICINE

## 2022-02-14 PROCEDURE — 1036F TOBACCO NON-USER: CPT | Performed by: INTERNAL MEDICINE

## 2022-02-14 PROCEDURE — 1090F PRES/ABSN URINE INCON ASSESS: CPT | Performed by: INTERNAL MEDICINE

## 2022-02-14 ASSESSMENT — ENCOUNTER SYMPTOMS
STRIDOR: 0
EYES NEGATIVE: 1
SHORTNESS OF BREATH: 1
CHEST TIGHTNESS: 0
COUGH: 0
NAUSEA: 0
GASTROINTESTINAL NEGATIVE: 1
WHEEZING: 0
BLOOD IN STOOL: 0

## 2022-02-14 NOTE — PROGRESS NOTES
Subsequent Progress Note  Patient: Jaren Rome  YOB: 1945  MRN: [id number]    Chief Complaint: CP BEATRICE HTN SIERRA dizzy  Chief Complaint   Patient presents with    Post-Op Check     CATH 01/11/22    Other     Chest Discomfort   Prior Dr. Marcela Aguayo pt. 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 1/2020 spect negative. 1/2020 echo ef 60  1/2020 CUS mild   2/21CUS mild   12/21 spect Inferior apical ischemia  12/21 Echo EF 65%  1/22 Cath Normal CORS. EDP 4 LVEF 55-60    Subjective/HPI: recent in hosp for CP. No ACS and released. She still has CP sometimes related to eating other times not. She has signficant SIERRA with walking. Getting worse. Has BEATRICE but not using CPAP as it leaks a lot. 1/30/2020 fell this past Saturday while picking up dog waste. No major injuries. She walked in here today. She has been dizzy lately. 9/2/2020 still dizzy but no further falls no bleed. Takes meds. stopped asa. . she has developed Dysphagia and choking with solids and liquids. 1/4/21 no cp no sob no falls no bleed no falls decreased appetite. Life  Long nonsmoker    5/4/21 no cp no sob no falls no bleed. Had bladde simulator for incontinence but not effective    10/29/21 now having burning cp and diaphoresis no radiation. Still has sierra. No bleed no falls takes meds. 12/9/21 still with burning CP and SOB. Subsides with rest not dizzy. no bleed. 2/14/22 had Esophagus dilted. Feels better. occ cp related to food. Cath negative      No ETOH  Retired- .    +FH  nonsmoker    EKG:    Past Medical History:   Diagnosis Date    Anticoagulant long-term use     Blood clot in abdominal vein     blood clot in mesenteric vein    Carotid artery stenosis     Family history of early CAD 6/28/2016    Fibromyalgia     GERD (gastroesophageal reflux disease)     Hypertension     Neuropathy     Sleep apnea     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 myelopathy or radiculopathy        Past Surgical History:   Procedure Laterality Date    BLADDER SURGERY      stimulator implant    BLADDER SUSPENSION      CHOLECYSTECTOMY      COLONOSCOPY  9/22/2020    COLONOSCOPY WITH BIOPSY AND POLYPECTOMY performed by Mercedes Gomes MD at [facility] COLON, DIAGNOSTIC      HYSTERECTOMY      INNER EAR SURGERY      UPPER GASTROINTESTINAL ENDOSCOPY N/A 9/22/2020    EGD WITH POLYPECTOMY AND DILATION performed by Mercedes Gomes MD at [facility] N/A 1/27/2022    EGD ESOPHAGOGASTRODUODENOSCOPY performed by Mariusz Burgess MD at [facility]       Family History   Problem Relation Age of Onset   Gavin Coffman Crohn's Disease Sister     Diabetes Sister     Heart Disease Mother     Kidney Disease Mother     Heart Disease Father     Obesity Sister     Heart Disease Sister     Colon Cancer Neg Hx        Social History     Socioeconomic History    Marital status:      Spouse name: None    Number of children: None    Years of education: None    Highest education level: None   Occupational History    None   Tobacco Use    Smoking status: Never Smoker    Smokeless tobacco: Never Used   Vaping Use    Vaping Use: Never used   Substance and Sexual Activity    Alcohol use: No    Drug use: Never    Sexual activity: None   Other Topics Concern    None   Social History Narrative    None     Social Determinants of Health     Financial Resource Strain: Low Risk     Difficulty of Paying Living Expenses: Not hard at all   Food Insecurity: No Food Insecurity    Worried About Running Out of Food in the Last Year: Never true    Patty of Food in the Last Year: Never true   Transportation Needs: No Transportation Needs    Lack of Transportation (Medical): No    Lack of Transportation (Non-Medical):  No   Physical Activity:     Days of Exercise per Week: Not on file    Minutes of Exercise per Session: Not on file   Stress:  Feeling of Stress : Not on file   Social Connections:     Frequency of Communication with Friends and Family: Not on file    Frequency of Social Gatherings with Friends and Family: Not on file    Attends Mormonism Services: Not on file    Active Member of Clubs or Organizations: Not on file    Attends Club or Organization Meetings: Not on file    Marital Status: Not on file   Intimate Partner Violence:     Fear of Current or Ex-Partner: Not on file    Emotionally Abused: Not on file    Physically Abused: Not on file    Sexually Abused: Not on file   Housing Stability:     Unable to Pay for Housing in the Last Year: Not on file    Number of [place] in the Last Year: Not on file    Unstable Housing in the Last Year: Not on file       Allergies   Allergen Reactions    Latex     Amoxicillin-Pot Clavulanate      Other reaction(s): Vomiting    Carbidopa-Levodopa      Other reaction(s): Unknown  Effects the eyes    Iv [Iodides]     Morphine Other (See Comments)     Made her go 'berserk '    Nitrofurantoin Other (See Comments)     Myalgia    Plavix [Clopidogrel Bisulfate]     Pravastatin Other (See Comments)     Myalgia       Current Outpatient Medications   Medication Sig Dispense Refill    atorvastatin (LIPITOR) 20 MG tablet Take 20 mg by mouth daily      famotidine (PEPCID) 20 MG tablet Take 1 tablet by mouth 2 times daily as needed (GERD) 60 tablet 3    pantoprazole (PROTONIX) 40 MG tablet Take 1 tablet by mouth every morning (before breakfast) 90 tablet 1    propranolol (INDERAL) 10 MG tablet Take 10 mg by mouth daily      metoprolol succinate (TOPROL XL) 50 MG extended release tablet Take 1 tablet by mouth daily 90 tablet 3    aspirin EC 81 MG EC tablet Take 1 tablet by mouth daily 90 tablet 3     No current facility-administered medications for this visit. Review of Systems:   Review of Systems   Constitutional: Positive for fatigue. Negative for diaphoresis.    HENT: Negative. Eyes: Negative. Respiratory: Positive for shortness of breath. Negative for cough, chest tightness, wheezing and stridor. Cardiovascular: Positive for chest pain. Negative for palpitations and leg swelling. Gastrointestinal: Negative. Negative for blood in stool and nausea. Genitourinary: Negative. Musculoskeletal: Negative. Skin: Negative. Neurological: Positive for dizziness and light-headedness. Negative for syncope and weakness. Hematological: Negative. Psychiatric/Behavioral: Negative. Physical Examination:    /80 (Site: Right Upper Arm, Position: Sitting, Cuff Size: Small Adult)   Pulse 62   Resp 15   Ht 5' 5\" (1.651 m)   Wt 162 lb 6.4 oz (73.7 kg)   SpO2 96%   BMI 27.02 kg/m²    Physical Exam   Constitutional: She appears healthy. No distress. HENT:   Normal cephalic and Atraumatic   Eyes: Pupils are equal, round, and reactive to light. Neck: Thyroid normal. No JVD present. No neck adenopathy. No thyromegaly present. Cardiovascular: Normal rate, regular rhythm, intact distal pulses and normal pulses. Murmur heard. Pulses:       Carotid pulses are on the right side with bruit and on the left side with bruit. Pulmonary/Chest: Effort normal and breath sounds normal. She has no wheezes. She has no rales. She exhibits no tenderness. Abdominal: Soft. Bowel sounds are normal. There is no abdominal tenderness. Musculoskeletal:         General: No tenderness or edema. Normal range of motion. Cervical back: Normal range of motion and neck supple. Neurological: She is alert and oriented to person, place, and time. Skin: Skin is warm. No cyanosis. Nails show no clubbing.        LABS:  CBC:   Lab Results   Component Value Date    WBC 7.2 12/09/2021    RBC 4.30 12/09/2021    RBC 4.35 06/01/2012    HGB 13.0 12/09/2021    HCT 39.5 12/09/2021    MCV 92.0 12/09/2021    MCH 30.3 12/09/2021    MCHC 32.9 12/09/2021    RDW 14.4 12/09/2021     12/09/2021    MPV 9.0 09/03/2014     Lipids:  Lab Results   Component Value Date    CHOL 128 06/30/2021    CHOL 169 09/21/2019    CHOL 105 06/16/2016     Lab Results   Component Value Date    TRIG 112 06/30/2021    TRIG 132 06/16/2016    TRIG 188 09/02/2014     Lab Results   Component Value Date    HDL 36 (L) 06/30/2021    HDL 40 06/16/2016    HDL 49 09/02/2014     Lab Results   Component Value Date    LDLCALC 70 06/30/2021    LDLCALC 39 06/16/2016    LDLCALC 102 09/02/2014     No results found for: LABVLDL, VLDL  Lab Results   Component Value Date    CHOLHDLRATIO 4.3 05/31/2012    CHOLHDLRATIO 4.6 05/30/2012     CMP:    Lab Results   Component Value Date     12/09/2021    K 4.1 12/09/2021     12/09/2021    CO2 22 12/09/2021    BUN 15 12/09/2021    CREATININE 0.86 12/09/2021    GFRAA >60.0 12/09/2021    LABGLOM >60.0 12/09/2021    GLUCOSE 88 12/09/2021    GLUCOSE 139 06/01/2012    PROT 7.1 06/30/2021    LABALBU 4.2 06/30/2021    LABALBU 4.1 05/31/2012    CALCIUM 9.2 12/09/2021    BILITOT 0.5 06/30/2021    ALKPHOS 89 06/30/2021    AST 26 06/30/2021    ALT 19 06/30/2021     BMP:    Lab Results   Component Value Date     12/09/2021    K 4.1 12/09/2021     12/09/2021    CO2 22 12/09/2021    BUN 15 12/09/2021    LABALBU 4.2 06/30/2021    LABALBU 4.1 05/31/2012    CREATININE 0.86 12/09/2021    CALCIUM 9.2 12/09/2021    GFRAA >60.0 12/09/2021    LABGLOM >60.0 12/09/2021    GLUCOSE 88 12/09/2021    GLUCOSE 139 06/01/2012     Magnesium:    Lab Results   Component Value Date    MG 2.2 12/26/2019    MG 2.1 05/30/2012     TSH:  Lab Results   Component Value Date    TSH 1.750 09/02/2014       Patient Active Problem List   Diagnosis    Dysphagia    Arterial fibromuscular dysplasia (HCC)    Fibromyalgia    Gastroesophageal reflux disease    Obstructive sleep apnea syndrome    Peripheral neuropathy (HCC)    Varicose veins of lower extremity    Chest pain    BEATRICE (obstructive sleep apnea)    Stenosis of right carotid artery    Dyslipidemia    BEATRICE on CPAP    Essential hypertension    Dizziness of unknown etiology    Gastric polyp    Esophageal stricture    Acute cystitis with hematuria    Acute bacterial sinusitis    Frequency of urination    Sensorineural hearing loss (SNHL) of both ears    Generalized abdominal pain       There are no discontinued medications. Modified Medications    No medications on file       No orders of the defined types were placed in this encounter. Assessment/Plan:    1. Essential hypertension   stable   And we will dc Low dose Losartan 25 given Postional dizizness. - stable - continue meds. Low salt diet    2. Chest pain, unspecified type - cath negative. 3. BEATRICE (obstructive sleep apnea)  Will need Pul referral   Not using CPAP. --- did not see Pulm    4. Stenosis of right carotid artery - resume ASA w Food. 5. Dyslipidemia - pt stopped Atorvastatin. - will resume atorva 20. - f/u labs. Low fat diet. 6. GERD- try Protonix 40 bid - much improved. 7. Dysphagia - s/p Esophageal dilation. 8. Car brutis- CUS stable   Counseling:  Heart Healthy Lifestyle, Low Salt Diet, Take Precautions to Prevent Falls and Walk Daily    Return in about 6 months (around 8/14/2022).       Electronically signed by Nathalia Sutton MD on 2/14/2022 at 4:23 PM

## 2022-06-04 DIAGNOSIS — E78.5 DYSLIPIDEMIA: Primary | ICD-10-CM

## 2022-06-06 RX ORDER — ATORVASTATIN CALCIUM 20 MG/1
TABLET, FILM COATED ORAL
Qty: 90 TABLET | Refills: 3 | Status: SHIPPED | OUTPATIENT
Start: 2022-06-06

## 2022-06-06 NOTE — TELEPHONE ENCOUNTER
Please approve or deny this refill request. The order is pended. Thank you.     LOV 2/14/2022    Next Visit Date:  Future Appointments   Date Time Provider Aaron Seo   6/13/2022 10:00 AM SADAF Kennedy - ELZBIETA Taylor   8/15/2022  2:00 PM Iza Smith MD [address] [place] [address]

## 2022-07-09 ENCOUNTER — OFFICE VISIT (OUTPATIENT)
Dept: FAMILY MEDICINE CLINIC | Age: 77
End: 2022-07-09
Payer: MEDICARE

## 2022-07-09 VITALS
OXYGEN SATURATION: 98 % | BODY MASS INDEX: 29.19 KG/M2 | SYSTOLIC BLOOD PRESSURE: 118 MMHG | WEIGHT: 158.6 LBS | RESPIRATION RATE: 18 BRPM | HEART RATE: 82 BPM | HEIGHT: 62 IN | DIASTOLIC BLOOD PRESSURE: 80 MMHG | TEMPERATURE: 97.8 F

## 2022-07-09 DIAGNOSIS — R30.0 DYSURIA: ICD-10-CM

## 2022-07-09 DIAGNOSIS — R30.0 DYSURIA: Primary | ICD-10-CM

## 2022-07-09 LAB
BILIRUBIN, POC: ABNORMAL
BLOOD URINE, POC: ABNORMAL
CLARITY, POC: ABNORMAL
COLOR, POC: ABNORMAL
GLUCOSE URINE, POC: ABNORMAL
KETONES, POC: ABNORMAL
LEUKOCYTE EST, POC: ABNORMAL
NITRITE, POC: ABNORMAL
PH, POC: 6
PROTEIN, POC: ABNORMAL
SPECIFIC GRAVITY, POC: 1.03
UROBILINOGEN, POC: ABNORMAL

## 2022-07-09 PROCEDURE — 1123F ACP DISCUSS/DSCN MKR DOCD: CPT | Performed by: PHYSICIAN ASSISTANT

## 2022-07-09 PROCEDURE — 1090F PRES/ABSN URINE INCON ASSESS: CPT | Performed by: PHYSICIAN ASSISTANT

## 2022-07-09 PROCEDURE — 99213 OFFICE O/P EST LOW 20 MIN: CPT | Performed by: PHYSICIAN ASSISTANT

## 2022-07-09 PROCEDURE — G8417 CALC BMI ABV UP PARAM F/U: HCPCS | Performed by: PHYSICIAN ASSISTANT

## 2022-07-09 PROCEDURE — G8399 PT W/DXA RESULTS DOCUMENT: HCPCS | Performed by: PHYSICIAN ASSISTANT

## 2022-07-09 PROCEDURE — 1036F TOBACCO NON-USER: CPT | Performed by: PHYSICIAN ASSISTANT

## 2022-07-09 PROCEDURE — G8427 DOCREV CUR MEDS BY ELIG CLIN: HCPCS | Performed by: PHYSICIAN ASSISTANT

## 2022-07-09 PROCEDURE — 81003 URINALYSIS AUTO W/O SCOPE: CPT | Performed by: PHYSICIAN ASSISTANT

## 2022-07-09 RX ORDER — SULFAMETHOXAZOLE AND TRIMETHOPRIM 800; 160 MG/1; MG/1
1 TABLET ORAL 2 TIMES DAILY
Qty: 6 TABLET | Refills: 0 | Status: SHIPPED | OUTPATIENT
Start: 2022-07-09 | End: 2022-07-12

## 2022-07-09 NOTE — PROGRESS NOTES
Subjective:      Patient ID: Micheal Gonsalez is a pleasant 76 y.o. female who presents today for:  Chief Complaint   Patient presents with    Dysuria     and urgency has been on and off x 2 weeks       Patient presents today with increased urgency and burning with urination. Patient does have history of recurrent UTIs. States that she does have a UTI every so often, approximately once maybe twice a year. Urinalysis today shows positive leukocyte esterase and +1 RBCs. States that the pain only comes on when she urinates, mild suprapubic tenderness. Did also bring up intermittent diarrhea and constipation. States that she has seen GI for this in the past.  Is not currently taking anything for diarrhea. Does state in hindsight that she also have intermittent diarrhea even in her typical day-to-day bowel movements. Did express to her to look into possible increase in dietary intake of probiotic/prebiotic foods and fiber. Patient stated she will look into getting some kefir for her bowels. Expressed to her to follow-up with PCP if no improvement in either urinary tract or bowel symptoms. No expressed tenderness to palpation on examination today, bowel sounds within norm limits. No suprapubic tenderness noted. Remaining exam WNL. Vital signs stable.       Patient Active Problem List   Diagnosis    Dysphagia    Arterial fibromuscular dysplasia (HCC)    Fibromyalgia    Gastroesophageal reflux disease    Obstructive sleep apnea syndrome    Peripheral neuropathy (HCC)    Varicose veins of lower extremity    Chest pain    BEATRICE (obstructive sleep apnea)    Stenosis of right carotid artery    Dyslipidemia    BEATRICE on CPAP    Essential hypertension    Dizziness of unknown etiology    Gastric polyp    Esophageal stricture    Acute cystitis with hematuria    Acute bacterial sinusitis    Frequency of urination    Sensorineural hearing loss (SNHL) of both ears    Generalized abdominal pain     Past Medical History:   Diagnosis Date    Anticoagulant long-term use     Blood clot in abdominal vein     blood clot in mesenteric vein    Carotid artery stenosis     Family history of early CAD 6/28/2016    Fibromyalgia     GERD (gastroesophageal reflux disease)     Hypertension     Neuropathy     Sleep apnea     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 myelopathy or radiculopathy      Past Surgical History:   Procedure Laterality Date    BLADDER SURGERY      stimulator implant    BLADDER SUSPENSION      CHOLECYSTECTOMY      COLONOSCOPY  9/22/2020    COLONOSCOPY WITH BIOPSY AND POLYPECTOMY performed by Miracle Lamb MD at [facility], DIAGNOSTIC      HYSTERECTOMY (CERVIX STATUS UNKNOWN)      INNER EAR SURGERY      UPPER GASTROINTESTINAL ENDOSCOPY N/A 9/22/2020    EGD WITH POLYPECTOMY AND DILATION performed by Miracle Lamb MD at [address] [facility] [address] ENDOSCOPY N/A 1/27/2022    EGD ESOPHAGOGASTRODUODENOSCOPY performed by Betty Hobbs MD at [facility] Marital status:      Spouse name: Not on file    Number of children: Not on file    Years of education: Not on file    Highest education level: Not on file   Occupational History    Not on file   Tobacco Use    Smoking status: Never Smoker    Smokeless tobacco: Never Used   Vaping Use    Vaping Use: Never used   Substance and Sexual Activity    Alcohol use: No    Drug use: Never    Sexual activity: Not on file   Other Topics Concern    Not on file   Social History Narrative    Not on file     Social Determinants of Health     Financial Resource Strain:     Difficulty of Paying Living Expenses: Not on file   Food Insecurity:     Worried About Running Out of Food in the Last Year: Not on file    [address] [facility] [address] of Food in the Last Year: Not on file   Transportation Needs:     Lack of Transportation (Medical):  Not on file    Lack of Transportation (Non-Medical): Not on file   Physical Activity:     Days of Exercise per Week: Not on file    Minutes of Exercise per Session: Not on file   Stress:     Feeling of Stress : Not on file   Social Connections:     Frequency of Communication with Friends and Family: Not on file    Frequency of Social Gatherings with Friends and Family: Not on file    Attends Voodoo Services: Not on file    Active Member of Clubs or Organizations: Not on file    Attends Club or Organization Meetings: Not on file    Marital Status: Not on file   Intimate Partner Violence:     Fear of Current or Ex-Partner: Not on file    Emotionally Abused: Not on file    Physically Abused: Not on file    Sexually Abused: Not on file   Housing Stability:     Unable to Pay for Housing in the Last Year: Not on file    Number of [place] in the Last Year: Not on file    Unstable Housing in the Last Year: Not on file     Family History   Problem Relation Age of Onset    Crohn's Disease Sister     Diabetes Sister     Heart Disease Mother     Kidney Disease Mother     Heart Disease Father     Obesity Sister     Heart Disease Sister     Colon Cancer Neg Hx      Allergies   Allergen Reactions    Latex     Amoxicillin-Pot Clavulanate      Other reaction(s): Vomiting    Carbidopa-Levodopa      Other reaction(s): Unknown  Effects the eyes    Iv [Iodides]     Morphine Other (See Comments)     Made her go 'berserk '    Nitrofurantoin Other (See Comments)     Myalgia    Plavix [Clopidogrel Bisulfate]     Pravastatin Other (See Comments)     Myalgia         Review of Systems  Pertinent findings in HPI  Objective:   /80 (Site: Right Upper Arm, Position: Sitting, Cuff Size: Medium Adult)   Pulse 82   Temp 97.8 °F (36.6 °C) (Temporal)   Resp 18   Ht 5' 2\" (1.575 m)   Wt 158 lb 9.6 oz (71.9 kg)   SpO2 98%   BMI 29.01 kg/m²     Physical Exam  Vitals reviewed.    Constitutional:       General: She is not in acute distress. Appearance: Normal appearance. She is obese. She is not ill-appearing. HENT:      Head: Normocephalic and atraumatic. Mouth/Throat:      Mouth: Mucous membranes are moist.      Pharynx: Oropharynx is clear. No oropharyngeal exudate or posterior oropharyngeal erythema. Eyes:      General: No scleral icterus. Extraocular Movements: Extraocular movements intact. Conjunctiva/sclera: Conjunctivae normal.   Cardiovascular:      Rate and Rhythm: Normal rate and regular rhythm. Pulmonary:      Effort: Pulmonary effort is normal. No respiratory distress. Breath sounds: Normal breath sounds. No wheezing. Abdominal:      General: Abdomen is flat. Bowel sounds are normal. There is distension (overweight). Tenderness: There is abdominal tenderness (mild TTP on exam in lower abdomen; no RUQ/LUQ ttp). Genitourinary:     Comments: DEFERRED  Musculoskeletal:      Cervical back: Normal range of motion and neck supple. Skin:     General: Skin is warm and dry. Neurological:      General: No focal deficit present. Mental Status: She is alert and oriented to person, place, and time. Motor: Weakness present. Gait: Gait normal.   Psychiatric:         Mood and Affect: Mood normal.         Behavior: Behavior normal.         Thought Content: Thought content normal.         Judgment: Judgment normal.         Assessment:       Diagnosis Orders   1. Dysuria  Culture, Urine    POCT Urinalysis No Micro (Auto)         Plan:      Orders Placed This Encounter   Procedures    Culture, Urine     Standing Status:   Future     Standing Expiration Date:   7/9/2023     Order Specific Question:   Specify (ex-cath, midstream, cysto, etc)?      Answer:   midstream    POCT Urinalysis No Micro (Auto)     Orders Placed This Encounter   Medications    sulfamethoxazole-trimethoprim (BACTRIM DS) 800-160 MG per tablet     Sig: Take 1 tablet by mouth 2 times daily for 3 days     Dispense:  6 tablet     Refill:  0       Will begin patient on course of Bactrim DS x3 days. Counseled on appropriate diet for history of diarrhea. We will follow-up with GI as needed. We will begin increasing intake of pre biotic and probiotic foods. No follow-ups on file. Side effects, adverse effects of the medication prescribed today, as well as treatment plan and result expectations have been discussed withthe patient who expresses understanding and desires to proceed.     Elma Garrido

## 2022-07-11 LAB
ORGANISM: ABNORMAL
URINE CULTURE, ROUTINE: ABNORMAL
URINE CULTURE, ROUTINE: ABNORMAL

## 2022-07-28 ENCOUNTER — TELEPHONE (OUTPATIENT)
Dept: FAMILY MEDICINE CLINIC | Age: 77
End: 2022-07-28

## 2022-08-04 ENCOUNTER — OFFICE VISIT (OUTPATIENT)
Dept: FAMILY MEDICINE CLINIC | Age: 77
End: 2022-08-04
Payer: MEDICARE

## 2022-08-04 VITALS
HEIGHT: 65 IN | WEIGHT: 158 LBS | RESPIRATION RATE: 14 BRPM | DIASTOLIC BLOOD PRESSURE: 80 MMHG | BODY MASS INDEX: 26.33 KG/M2 | OXYGEN SATURATION: 97 % | HEART RATE: 68 BPM | SYSTOLIC BLOOD PRESSURE: 130 MMHG

## 2022-08-04 DIAGNOSIS — R21 SKIN RASH: Primary | ICD-10-CM

## 2022-08-04 DIAGNOSIS — I10 ESSENTIAL HYPERTENSION: ICD-10-CM

## 2022-08-04 DIAGNOSIS — R42 DIZZINESS: ICD-10-CM

## 2022-08-04 PROCEDURE — 1090F PRES/ABSN URINE INCON ASSESS: CPT | Performed by: INTERNAL MEDICINE

## 2022-08-04 PROCEDURE — 90677 PCV20 VACCINE IM: CPT | Performed by: INTERNAL MEDICINE

## 2022-08-04 PROCEDURE — G8417 CALC BMI ABV UP PARAM F/U: HCPCS | Performed by: INTERNAL MEDICINE

## 2022-08-04 PROCEDURE — 1123F ACP DISCUSS/DSCN MKR DOCD: CPT | Performed by: INTERNAL MEDICINE

## 2022-08-04 PROCEDURE — G8427 DOCREV CUR MEDS BY ELIG CLIN: HCPCS | Performed by: INTERNAL MEDICINE

## 2022-08-04 PROCEDURE — 1036F TOBACCO NON-USER: CPT | Performed by: INTERNAL MEDICINE

## 2022-08-04 PROCEDURE — 99214 OFFICE O/P EST MOD 30 MIN: CPT | Performed by: INTERNAL MEDICINE

## 2022-08-04 PROCEDURE — G8399 PT W/DXA RESULTS DOCUMENT: HCPCS | Performed by: INTERNAL MEDICINE

## 2022-08-04 RX ORDER — ZOSTER VACCINE RECOMBINANT, ADJUVANTED 50 MCG/0.5
0.5 KIT INTRAMUSCULAR SEE ADMIN INSTRUCTIONS
Qty: 0.5 ML | Refills: 0 | Status: SHIPPED | OUTPATIENT
Start: 2022-08-04 | End: 2023-01-31

## 2022-08-04 RX ORDER — NYSTATIN 100000 [USP'U]/G
POWDER TOPICAL
Qty: 60 G | Refills: 5 | Status: SHIPPED | OUTPATIENT
Start: 2022-08-04

## 2022-08-04 RX ORDER — ZOSTER VACCINE RECOMBINANT, ADJUVANTED 50 MCG/0.5
0.5 KIT INTRAMUSCULAR SEE ADMIN INSTRUCTIONS
Qty: 0.5 ML | Refills: 0 | Status: SHIPPED | OUTPATIENT
Start: 2022-08-04 | End: 2022-08-04

## 2022-08-04 SDOH — ECONOMIC STABILITY: FOOD INSECURITY: WITHIN THE PAST 12 MONTHS, THE FOOD YOU BOUGHT JUST DIDN'T LAST AND YOU DIDN'T HAVE MONEY TO GET MORE.: NEVER TRUE

## 2022-08-04 SDOH — ECONOMIC STABILITY: FOOD INSECURITY: WITHIN THE PAST 12 MONTHS, YOU WORRIED THAT YOUR FOOD WOULD RUN OUT BEFORE YOU GOT MONEY TO BUY MORE.: NEVER TRUE

## 2022-08-04 ASSESSMENT — SOCIAL DETERMINANTS OF HEALTH (SDOH): HOW HARD IS IT FOR YOU TO PAY FOR THE VERY BASICS LIKE FOOD, HOUSING, MEDICAL CARE, AND HEATING?: NOT HARD AT ALL

## 2022-08-04 ASSESSMENT — ENCOUNTER SYMPTOMS
SORE THROAT: 0
EYE ITCHING: 0
WHEEZING: 0
VOMITING: 0
SINUS PRESSURE: 0
ABDOMINAL DISTENTION: 0
BACK PAIN: 0
FACIAL SWELLING: 0
SINUS PAIN: 0
EYE PAIN: 0
NAUSEA: 0
CHEST TIGHTNESS: 0
APNEA: 0
RECTAL PAIN: 0
PHOTOPHOBIA: 0
DIARRHEA: 0
ABDOMINAL PAIN: 0
COLOR CHANGE: 0
BLOOD IN STOOL: 0
CONSTIPATION: 0
EYE REDNESS: 0
RHINORRHEA: 0
VOICE CHANGE: 0
COUGH: 0
TROUBLE SWALLOWING: 0
EYE DISCHARGE: 0
SHORTNESS OF BREATH: 0

## 2022-08-04 NOTE — PROGRESS NOTES
Subjective     Frandyqugeraldine Finical 76 y.o. female presents 8/4/22 with   Chief Complaint   Patient presents with    Physical Therapy       64-year-old female sees with a history of bladder stimulator placed at [facility], has appt 5-6 years ago. Presents with a complaint        Skin rash: The patient reports a skin rash noted beneath her breasts bilaterally. It is erythematous and pruritic. Depression : The patient states that she requested that her  leave her home          Bilateral hand pain: In the past the patient previously past the patient voiced a concern regarding a  possible diagnosis of rheumatoid arthritis. The patient reports decreased hearing. She would like referral to ENT        Gait instability: The patient states that she has been experiencing difficulty ambulating          Fatigue: The patient states that she has been experiencing persistent fatigue for several months. Essential tremors:compliant with primidone However her tremors have worsened. GERD symptoms have worsened:off  protonix for 2 weeks. Health maintenance: The patient is due for shingles and pneumococcal vaccinations. At present he denies polyuria,  Polydipsia, constitutional, sinus, visual, cardiopulmonary, urologic, gastrointestinal, immunologic/hematologic, musculoskeletal, neurologic,dermatologic, or psychiatric complaints.     Reviewed the following history:    Past Medical History:   Diagnosis Date    Anticoagulant long-term use     Blood clot in abdominal vein     blood clot in mesenteric vein    Carotid artery stenosis     Family history of early CAD 6/28/2016    Fibromyalgia     GERD (gastroesophageal reflux disease)     Hypertension     Neuropathy     Sleep apnea     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 myelopathy or radiculopathy      Past Surgical History:   Procedure Laterality Date    BLADDER SURGERY      stimulator implant    BLADDER SUSPENSION      CHOLECYSTECTOMY COLONOSCOPY  9/22/2020    COLONOSCOPY WITH BIOPSY AND POLYPECTOMY performed by Sunny Essex, MD at [facility], COLON, DIAGNOSTIC      HYSTERECTOMY (CERVIX STATUS UNKNOWN)      INNER EAR SURGERY      UPPER GASTROINTESTINAL ENDOSCOPY N/A 9/22/2020    EGD WITH POLYPECTOMY AND DILATION performed by Sunny Essex, MD at [facility] 1/27/2022    EGD ESOPHAGOGASTRODUODENOSCOPY performed by Davina Aleman MD at [facility]     Family History   Problem Relation Age of Onset    Crohn's Disease Sister     Diabetes Sister     Heart Disease Mother     Kidney Disease Mother     Heart Disease Father     Obesity Sister     Heart Disease Sister     Colon Cancer Neg Hx        Allergies   Allergen Reactions    Latex     Amoxicillin-Pot Clavulanate      Other reaction(s): Vomiting    Carbidopa-Levodopa      Other reaction(s): Unknown  Effects the eyes    Iv [Iodides]     Morphine Other (See Comments)     Made her go 'berserk '    Nitrofurantoin Other (See Comments)     Myalgia    Plavix [Clopidogrel Bisulfate]     Pravastatin Other (See Comments)     Myalgia       Current Outpatient Medications on File Prior to Visit   Medication Sig Dispense Refill    atorvastatin (LIPITOR) 20 MG tablet Take 1 tablet by mouth once daily 90 tablet 3    famotidine (PEPCID) 20 MG tablet Take 1 tablet by mouth 2 times daily as needed (GERD) 60 tablet 3    pantoprazole (PROTONIX) 40 MG tablet Take 1 tablet by mouth every morning (before breakfast) 90 tablet 1    propranolol (INDERAL) 10 MG tablet Take 10 mg by mouth daily      metoprolol succinate (TOPROL XL) 50 MG extended release tablet Take 1 tablet by mouth daily 90 tablet 3    aspirin EC 81 MG EC tablet Take 1 tablet by mouth daily 90 tablet 3     No current facility-administered medications on file prior to visit. Review of Systems   Constitutional:  Negative for chills, diaphoresis, fatigue and fever.    HENT:  Negative for congestion, dental problem, drooling, ear discharge, ear pain, facial swelling, hearing loss, mouth sores, nosebleeds, postnasal drip, rhinorrhea, sinus pressure, sinus pain, sneezing, sore throat, tinnitus, trouble swallowing and voice change. Eyes:  Negative for photophobia, pain, discharge, redness, itching and visual disturbance. Respiratory:  Negative for apnea, cough, chest tightness, shortness of breath and wheezing. Cardiovascular:  Negative for chest pain, palpitations and leg swelling. Gastrointestinal:  Negative for abdominal distention, abdominal pain, blood in stool, constipation, diarrhea, nausea, rectal pain and vomiting. Endocrine: Negative for cold intolerance, heat intolerance, polydipsia, polyphagia and polyuria. Genitourinary:  Negative for decreased urine volume, difficulty urinating, dysuria, flank pain, frequency, genital sores, hematuria, pelvic pain, urgency (pressure), vaginal bleeding, vaginal discharge and vaginal pain. Musculoskeletal:  Negative for arthralgias, back pain, gait problem, joint swelling, myalgias, neck pain and neck stiffness. Skin:  Negative for color change, rash and wound. Allergic/Immunologic: Negative for environmental allergies and food allergies. Neurological:  Negative for dizziness, tremors, seizures, syncope, facial asymmetry, speech difficulty, weakness, light-headedness, numbness and headaches. Hematological:  Negative for adenopathy. Does not bruise/bleed easily. Psychiatric/Behavioral:  Negative for agitation, confusion, decreased concentration, hallucinations, self-injury, sleep disturbance and suicidal ideas. The patient is not nervous/anxious. Objective    Vitals:    08/04/22 1149   BP: 130/80   Pulse: 68   Resp: 14   SpO2: 97%   Weight: 158 lb (71.7 kg)   Height: 5' 5\" (1.651 m)       Physical Exam  Vitals reviewed. Constitutional:       General: She is not in acute distress. Appearance: She is well-developed.  She is not ill-appearing or toxic-appearing. HENT:      Head: Normocephalic and atraumatic. Right Ear: Hearing and external ear normal.      Left Ear: Hearing and external ear normal.   Cardiovascular:      Rate and Rhythm: Normal rate and regular rhythm. Pulmonary:      Effort: Pulmonary effort is normal. No respiratory distress. Breath sounds: Normal breath sounds. Abdominal:      General: Abdomen is flat. Bowel sounds are normal.      Palpations: Abdomen is soft. Tenderness: There is no abdominal tenderness. There is no right CVA tenderness or left CVA tenderness. Skin:     General: Skin is warm and dry. Neurological:      General: No focal deficit present. Mental Status: She is alert and oriented to person, place, and time. POC Testing Today:   No results found for this visit on 08/04/22. Assessment and Plan    Diagnoses and all orders for this visit:  Skin rash: Refill nystatin      Depression, unspecified depression type        Post-menopausal-previously ordered a dexa scan          Essential tremor: Continue primodone to 50 mg daily         GERD:REFILLED PROTONIX        Positive depression screening    Previously referred to Vannesa Pat MD, [place] and psychology    Health maintenance: We will administer pneumococcal vaccine and provided with a prescription for shingles vaccine    Return in about 3 months (around 11/4/2022).       Ross Herron MD

## 2022-08-08 ENCOUNTER — TELEPHONE (OUTPATIENT)
Dept: FAMILY MEDICINE CLINIC | Age: 77
End: 2022-08-08

## 2022-08-17 ENCOUNTER — OFFICE VISIT (OUTPATIENT)
Dept: CARDIOLOGY CLINIC | Age: 77
End: 2022-08-17
Payer: MEDICARE

## 2022-08-17 VITALS
SYSTOLIC BLOOD PRESSURE: 120 MMHG | DIASTOLIC BLOOD PRESSURE: 80 MMHG | HEART RATE: 81 BPM | BODY MASS INDEX: 25.96 KG/M2 | WEIGHT: 156 LBS

## 2022-08-17 DIAGNOSIS — I10 ESSENTIAL HYPERTENSION: Primary | ICD-10-CM

## 2022-08-17 DIAGNOSIS — I65.21 STENOSIS OF RIGHT CAROTID ARTERY: ICD-10-CM

## 2022-08-17 DIAGNOSIS — R09.89 BILATERAL CAROTID BRUITS: ICD-10-CM

## 2022-08-17 DIAGNOSIS — E78.5 DYSLIPIDEMIA: ICD-10-CM

## 2022-08-17 DIAGNOSIS — R13.10 DYSPHAGIA, UNSPECIFIED TYPE: ICD-10-CM

## 2022-08-17 DIAGNOSIS — G47.33 OBSTRUCTIVE SLEEP APNEA SYNDROME: ICD-10-CM

## 2022-08-17 PROCEDURE — 1123F ACP DISCUSS/DSCN MKR DOCD: CPT | Performed by: INTERNAL MEDICINE

## 2022-08-17 PROCEDURE — G8417 CALC BMI ABV UP PARAM F/U: HCPCS | Performed by: INTERNAL MEDICINE

## 2022-08-17 PROCEDURE — 1090F PRES/ABSN URINE INCON ASSESS: CPT | Performed by: INTERNAL MEDICINE

## 2022-08-17 PROCEDURE — 1036F TOBACCO NON-USER: CPT | Performed by: INTERNAL MEDICINE

## 2022-08-17 PROCEDURE — 93000 ELECTROCARDIOGRAM COMPLETE: CPT | Performed by: INTERNAL MEDICINE

## 2022-08-17 PROCEDURE — 99214 OFFICE O/P EST MOD 30 MIN: CPT | Performed by: INTERNAL MEDICINE

## 2022-08-17 PROCEDURE — G8399 PT W/DXA RESULTS DOCUMENT: HCPCS | Performed by: INTERNAL MEDICINE

## 2022-08-17 PROCEDURE — G8427 DOCREV CUR MEDS BY ELIG CLIN: HCPCS | Performed by: INTERNAL MEDICINE

## 2022-08-17 ASSESSMENT — ENCOUNTER SYMPTOMS
CHEST TIGHTNESS: 0
STRIDOR: 0
EYES NEGATIVE: 1
SHORTNESS OF BREATH: 1
COUGH: 0
BLOOD IN STOOL: 0
WHEEZING: 0
NAUSEA: 0
GASTROINTESTINAL NEGATIVE: 1

## 2022-08-17 NOTE — PROGRESS NOTES
Subsequent Progress Note  Patient: Birdie Eugene  YOB: 1945  MRN: [id number]    Chief Complaint: CP BEATRICE HTN SIERRA dizzy  Chief Complaint   Patient presents with    Hypertension    6 Month Follow-Up        CV Data:  2010 Mesenteric Thrombus - treated with Warfarin. 1/2020 spect negative. 1/2020 echo ef 60  1/2020 CUS mild   2/21CUS mild   12/21 spect Inferior apical ischemia  12/21 Echo EF 65%  1/22 Cath Normal CORS. EDP 4 LVEF 55-60    Subjective/HPI: recent in hosp for CP. No ACS and released. She still has CP sometimes related to eating other times not. She has signficant SIERRA with walking. Getting worse. Has BEATRICE but not using CPAP as it leaks a lot. 1/30/2020 fell this past Saturday while picking up dog waste. No major injuries. She walked in here today. She has been dizzy lately. 9/2/2020 still dizzy but no further falls no bleed. Takes meds. stopped asa. . she has developed Dysphagia and choking with solids and liquids. 1/4/21 no cp no sob no falls no bleed no falls decreased appetite. Life  Long nonsmoker    5/4/21 no cp no sob no falls no bleed. Had bladde simulator for incontinence but not effective    10/29/21 now having burning cp and diaphoresis no radiation. Still has sierra. No bleed no falls takes meds. 12/9/21 still with burning CP and SOB. Subsides with rest not dizzy. no bleed. 2/14/22 had Esophagus dilted. Feels better. occ cp related to food. Cath negative    8/17/22 doing well no cp no sob no falls no bleed not very active. Still has dyphagia. No ETOH  Retired- .    +FH  Nonsmoker  Lives w     EKG: SR 80    Past Medical History:   Diagnosis Date    Anticoagulant long-term use     Blood clot in abdominal vein     blood clot in mesenteric vein    Carotid artery stenosis     Family history of early CAD 6/28/2016    Fibromyalgia     GERD (gastroesophageal reflux disease)     Hypertension     Neuropathy     Sleep apnea 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 myelopathy or radiculopathy        Past Surgical History:   Procedure Laterality Date    BLADDER SURGERY      stimulator implant    BLADDER SUSPENSION      CHOLECYSTECTOMY      COLONOSCOPY  9/22/2020    COLONOSCOPY WITH BIOPSY AND POLYPECTOMY performed by Everardo Nevarez MD at [place] [address], COLON, DIAGNOSTIC      HYSTERECTOMY (CERVIX STATUS UNKNOWN)      INNER EAR SURGERY      UPPER GASTROINTESTINAL ENDOSCOPY N/A 9/22/2020    EGD WITH POLYPECTOMY AND DILATION performed by Everardo Nevarez MD at [facility] 1/27/2022    EGD ESOPHAGOGASTRODUODENOSCOPY performed by Kike Grant MD at [facility]       Family History   Problem Relation Age of Onset    Crohn's Disease Sister     Diabetes Sister     Heart Disease Mother     Kidney Disease Mother     Heart Disease Father     Obesity Sister     Heart Disease Sister     Colon Cancer Neg Hx        Social History     Socioeconomic History    Marital status:    Tobacco Use    Smoking status: Never    Smokeless tobacco: Never   Vaping Use    Vaping Use: Never used   Substance and Sexual Activity    Alcohol use: No    Drug use: Never     Social Determinants of Health     Financial Resource Strain: Low Risk     Difficulty of Paying Living Expenses: Not hard at all   Food Insecurity: No Food Insecurity    Worried About Running Out of Food in the Last Year: Never true    Ran Out of Food in the Last Year: Never true       Allergies   Allergen Reactions    Latex     Amoxicillin-Pot Clavulanate      Other reaction(s): Vomiting    Carbidopa-Levodopa      Other reaction(s): Unknown  Effects the eyes    Iv [Iodides]     Morphine Other (See Comments)     Made her go 'berserk '    Nitrofurantoin Other (See Comments)     Myalgia    Plavix [Clopidogrel Bisulfate]     Pravastatin Other (See Comments)     Myalgia       Current Outpatient Medications   Medication Sig Dispense Cardiovascular: Normal rate, regular rhythm, intact distal pulses and normal pulses. Murmur heard. Pulses:       Carotid pulses are  on the right side with bruit and  on the left side with bruit. Pulmonary/Chest: Effort normal and breath sounds normal. She has no wheezes. She has no rales. She exhibits no tenderness. Abdominal: Soft. Bowel sounds are normal. There is no abdominal tenderness. Musculoskeletal:         General: No tenderness or edema. Normal range of motion. Cervical back: Normal range of motion and neck supple. Neurological: She is alert and oriented to person, place, and time. Skin: Skin is warm. No cyanosis. Nails show no clubbing.      LABS:  CBC:   Lab Results   Component Value Date/Time    WBC 7.2 12/09/2021 01:24 PM    RBC 4.30 12/09/2021 01:24 PM    RBC 4.35 06/01/2012 07:40 AM    HGB 13.0 12/09/2021 01:24 PM    HCT 39.5 12/09/2021 01:24 PM    MCV 92.0 12/09/2021 01:24 PM    MCH 30.3 12/09/2021 01:24 PM    MCHC 32.9 12/09/2021 01:24 PM    RDW 14.4 12/09/2021 01:24 PM     12/09/2021 01:24 PM    MPV 9.0 09/03/2014 02:39 PM     Lipids:  Lab Results   Component Value Date    CHOL 128 06/30/2021    CHOL 169 09/21/2019    CHOL 105 06/16/2016     Lab Results   Component Value Date    TRIG 112 06/30/2021    TRIG 132 06/16/2016    TRIG 188 09/02/2014     Lab Results   Component Value Date    HDL 36 (L) 06/30/2021    HDL 40 06/16/2016    HDL 49 09/02/2014     Lab Results   Component Value Date    LDLCALC 70 06/30/2021    LDLCALC 39 06/16/2016    LDLCALC 102 09/02/2014     No results found for: LABVLDL, VLDL  Lab Results   Component Value Date    CHOLHDLRATIO 4.3 05/31/2012    CHOLHDLRATIO 4.6 05/30/2012     CMP:    Lab Results   Component Value Date/Time     12/09/2021 01:24 PM    K 4.1 12/09/2021 01:24 PM     12/09/2021 01:24 PM    CO2 22 12/09/2021 01:24 PM    BUN 15 12/09/2021 01:24 PM    CREATININE 0.86 12/09/2021 01:24 PM    GFRAA >60.0 12/09/2021 01:24 PM LABGLOM >60.0 12/09/2021 01:24 PM    GLUCOSE 88 12/09/2021 01:24 PM    GLUCOSE 139 06/01/2012 07:40 AM    PROT 7.1 06/30/2021 08:09 AM    LABALBU 4.2 06/30/2021 08:09 AM    LABALBU 4.1 05/31/2012 06:10 AM    CALCIUM 9.2 12/09/2021 01:24 PM    BILITOT 0.5 06/30/2021 08:09 AM    ALKPHOS 89 06/30/2021 08:09 AM    AST 26 06/30/2021 08:09 AM    ALT 19 06/30/2021 08:09 AM     BMP:    Lab Results   Component Value Date/Time     12/09/2021 01:24 PM    K 4.1 12/09/2021 01:24 PM     12/09/2021 01:24 PM    CO2 22 12/09/2021 01:24 PM    BUN 15 12/09/2021 01:24 PM    LABALBU 4.2 06/30/2021 08:09 AM    LABALBU 4.1 05/31/2012 06:10 AM    CREATININE 0.86 12/09/2021 01:24 PM    CALCIUM 9.2 12/09/2021 01:24 PM    GFRAA >60.0 12/09/2021 01:24 PM    LABGLOM >60.0 12/09/2021 01:24 PM    GLUCOSE 88 12/09/2021 01:24 PM    GLUCOSE 139 06/01/2012 07:40 AM     Magnesium:    Lab Results   Component Value Date/Time    MG 2.2 12/26/2019 11:15 AM    MG 2.1 05/30/2012 01:00 PM     TSH:  Lab Results   Component Value Date    TSH 1.750 09/02/2014       Patient Active Problem List   Diagnosis    Dysphagia    Arterial fibromuscular dysplasia (HCC)    Fibromyalgia    Gastroesophageal reflux disease    Obstructive sleep apnea syndrome    Peripheral neuropathy (HCC)    Varicose veins of lower extremity    Chest pain    BEATRICE (obstructive sleep apnea)    Stenosis of right carotid artery    Dyslipidemia    BEATRICE on CPAP    Essential hypertension    Dizziness of unknown etiology    Gastric polyp    Esophageal stricture    Acute cystitis with hematuria    Acute bacterial sinusitis    Frequency of urination    Sensorineural hearing loss (SNHL) of both ears    Generalized abdominal pain       There are no discontinued medications. Modified Medications    No medications on file       No orders of the defined types were placed in this encounter. Assessment/Plan:    1.  Essential hypertension   stable   And we will dc Low dose Los[facility] - stable - continue meds. Low salt diet    2. Chest pain, unspecified type - cath negative. 3. BEATRICE (obstructive sleep apnea)  Will need Pul referral   Not using CPAP. --- did not see Pulm    4. Stenosis of right carotid artery - resume ASA w Food. 5. Dyslipidemia - pt stopped Atorvastatin. - will resume atorva 20. - f/u labs. Low fat diet. 6. GERD- try Protonix 40 bid - much improved. 7. Dysphagia - s/p Esophageal dilation. - acting up again may need dilatation. See Dr. Radha Vasquez    8. Car brutis- [facility] stable   Counseling:  Heart Healthy Lifestyle, Low Salt Diet, Take Precautions to Prevent Falls and Walk Daily    Return in about 6 months (around 2/17/2023).       Electronically signed by Yevgeniy Tang MD on 8/17/2022 at 11:57 AM

## 2022-09-01 RX ORDER — PANTOPRAZOLE SODIUM 40 MG/1
TABLET, DELAYED RELEASE ORAL
Qty: 90 TABLET | Refills: 0 | Status: SHIPPED | OUTPATIENT
Start: 2022-09-01 | End: 2022-10-21

## 2022-10-21 ENCOUNTER — OFFICE VISIT (OUTPATIENT)
Dept: GASTROENTEROLOGY | Age: 77
End: 2022-10-21
Payer: MEDICARE

## 2022-10-21 VITALS
WEIGHT: 157 LBS | HEIGHT: 65 IN | OXYGEN SATURATION: 100 % | DIASTOLIC BLOOD PRESSURE: 68 MMHG | BODY MASS INDEX: 26.16 KG/M2 | HEART RATE: 61 BPM | SYSTOLIC BLOOD PRESSURE: 122 MMHG

## 2022-10-21 DIAGNOSIS — K21.9 GASTROESOPHAGEAL REFLUX DISEASE WITHOUT ESOPHAGITIS: Primary | ICD-10-CM

## 2022-10-21 DIAGNOSIS — R13.14 PHARYNGOESOPHAGEAL DYSPHAGIA: ICD-10-CM

## 2022-10-21 DIAGNOSIS — R19.7 DIARRHEA, UNSPECIFIED TYPE: ICD-10-CM

## 2022-10-21 PROCEDURE — 1123F ACP DISCUSS/DSCN MKR DOCD: CPT | Performed by: NURSE PRACTITIONER

## 2022-10-21 PROCEDURE — 1090F PRES/ABSN URINE INCON ASSESS: CPT | Performed by: NURSE PRACTITIONER

## 2022-10-21 PROCEDURE — 1036F TOBACCO NON-USER: CPT | Performed by: NURSE PRACTITIONER

## 2022-10-21 PROCEDURE — G8484 FLU IMMUNIZE NO ADMIN: HCPCS | Performed by: NURSE PRACTITIONER

## 2022-10-21 PROCEDURE — G8399 PT W/DXA RESULTS DOCUMENT: HCPCS | Performed by: NURSE PRACTITIONER

## 2022-10-21 PROCEDURE — 99214 OFFICE O/P EST MOD 30 MIN: CPT | Performed by: NURSE PRACTITIONER

## 2022-10-21 PROCEDURE — G8417 CALC BMI ABV UP PARAM F/U: HCPCS | Performed by: NURSE PRACTITIONER

## 2022-10-21 PROCEDURE — G8427 DOCREV CUR MEDS BY ELIG CLIN: HCPCS | Performed by: NURSE PRACTITIONER

## 2022-10-21 RX ORDER — ALUMINUM ZIRCONIUM OCTACHLOROHYDREX GLY 16 G/100G
GEL TOPICAL
Qty: 425 G | Refills: 2 | Status: SHIPPED | OUTPATIENT
Start: 2022-10-21

## 2022-10-21 RX ORDER — ESOMEPRAZOLE MAGNESIUM 40 MG/1
40 CAPSULE, DELAYED RELEASE ORAL DAILY
Qty: 30 CAPSULE | Refills: 3 | Status: SHIPPED | OUTPATIENT
Start: 2022-10-21

## 2022-10-21 RX ORDER — DOCUSATE SODIUM 100 MG/1
100 CAPSULE, LIQUID FILLED ORAL NIGHTLY
Qty: 30 CAPSULE | Refills: 3 | Status: SHIPPED | OUTPATIENT
Start: 2022-10-21

## 2022-10-21 RX ORDER — FAMOTIDINE 20 MG/1
20 TABLET, FILM COATED ORAL 2 TIMES DAILY PRN
Qty: 60 TABLET | Refills: 3 | Status: SHIPPED | OUTPATIENT
Start: 2022-10-21

## 2022-10-21 NOTE — PROGRESS NOTES
Gastroenterology Clinic Follow up Visit    Marck Lai  [id number]  Chief Complaint   Patient presents with    Follow-up     HPI: 76 y.o. female following up after last GI clinic on 2/10/2022. Interval change: Patient presents to the GI clinic with constellation of upper and lower GI symptoms. She endorses her dentist recommended she follow-up in the GI clinic, in 3 months time she has had 7 cavities and one crown, reports her dentist states her acid reflux is causing severe tooth decay. States she is taking Protonix daily however continues to have GERD symptoms associated with epigastric pain, states she has to sit in a recliner at night to help with her symptoms. She continues to endorse dysphagia, mainly choking, on solids especially meats. Denies dysphagia to liquids. States she has extremely poor appetite, however not much weight loss. Upon review of medical record, her weight remains stable. States she grazes throughout the day. She additionally endorses diarrhea that began approximately 6 weeks ago. Upon further questioning patient reports she goes 2-3 days without a bowel movement, however when she has a bowel movement she first has \"hard lumps \"with subsequent liquid stool following for approximately 1 day then this cycle repeats. She denies nausea/vomiting, hematemesis, dysphagia, hematochezia, melena or weight loss. States she has appointment with an ENT specialist, Dr. Shaheen Bay, at the first part of November. States she did not follow-up with speech therapy as previously recommended at her prior GI clinic appointment. States her neurologist at [facility] told her she does not have Parkinson disease, however has essential tremors. HPI from last GI clinic visit on 2/10/2022  summarized below:  64 y.o. female following up after last GI clinic on 1/18/2022. Recent Campylobacter infection, s/p antibiotic therapy. S/p recent EGD with dilation.   Patient continues to report daily pharyngoesophageal dysphagia to solids, has to use liquids to get her food down. Patient with history of extensive dysphagia investigation in the past (summarized below). Of note, patient does report modified barium swallow February 2020 with recommendations to follow with speech therapy, however patient did not continue as COVID-19  She reports GERD symptoms well controlled with taking pantoprazole twice daily, however she will have symptoms at night depending on what she eats. She also reports for the last 2 to 3 days episodes of diarrhea. Prior to this she was having a bowel movement daily after finishing her antibiotic. She reports not taking a fiber supplement or probiotic. She does endorse fatigue, feels tired all the time. She attributes this to having to get up 2-4 times per night to go to the bathroom, has bladder stimulator, follows with urology with next appointment in a week. Patient reports following with neurology at [facility], Dr. Brenda Gifford, for movement disorder. States she has had 2 other doctors at [facility] tell her she has Parkinson's disease and other doctors states she has essential tremors. She denies history of stroke, however has trouble getting words out at times. She reports her weight is stable. Patient denies nausea/vomiting, hematemesis, abdominal pain, hematochezia, melena or weight loss. Patient denies chest pain, shortness of breath or palpitations. Smoking status: denies  Alcohol use: denies  Illicit drug use: denies  NSAID use: denies      HPI from last GI clinic visit on 1/18/2022  summarized below:  Patient reports having a virtual visit at the walk-in clinic on 1/12/2022 for foul-smelling loose stools x3 weeks associated with abdominal pain, stool positive for Campylobacter and she was placed on Zithromax x7 days (last dose scheduled for 1/22/22). Patient reports significant symptom improvement.   States she is only having 2 loose stools per day now, abdominal cramping/pain is subsiding. She does report still feeling weak/fatigued. States she is following a bland/brat type diet. She denies fevers/chills, severe abdominal pain, confusion or dizziness. She denies hematochezia or melena. Patient with longstanding history of GERD, takes pantoprazole 40 mg daily with report of breakthrough symptoms occurring mostly at night 1-2 times per week. She endorses longstanding history of dysphagia (extensive work-up with multiple doctors), had EGD with dilation 9/2020 (finding of mucosal ring at GE junction) with improvement in her symptoms up until about 6 months ago. Patient currently reports daily pharyngoesophageal dysphagia to solids only. States she will alternate liquids to help push the food down. She denies nausea/vomiting, hematemesis or unintentional weight loss. Upon review of medical record, weight is stable despite patient's diarrheal symptoms. Of note, patient has history of recent heart cath on 1/11/2022 which was negative, EF 60%. She takes baby aspirin daily. Denies anticoagulation. HPI from last GI clinic virtual visit with Dr. Natha Shone on 1/18/21 summarized below:  Dysphagia has improved significantly but still has to drink water at times to help her swallow. Patient reports having fecal incontinence now but the stool is formed, she has a history of urinary incontinence and has a later placed which is malfunctioning and she is scheduled to see urologist for replacement of that. No history of any bleeding, no nausea or vomiting. 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 Has seen multiple GI doctors at [facility] in the past, normal Bravo study in 2015, normal manometry performed July 2019.      Previous GI work up/Endoscopic investigations:  EGD 1/27/2022: 2 cm hiatal hernia, tortuous esophagus, empiric dilation with 20 mm savory dilator, post dilation reveals mild mucosal tearing at the upper esophageal sphincter area. #EGD 9/22/2020: Mucosal ring at GE junction, s/p post dilation and gastric polyps. Histology: Gastric polyps-fundic gland polyps, negative for H. pylori     #Colonoscopy 9/22/2020: Diverticulosis of large intestine, polyp ascending colon. Histology: Ileum colon polyp-ileal mucosa with focal mild chronic inflammation and focally increased eosinophils. Random colon biopsies-colonic mucosa with focal mild nonspecific chronic inflammation. Ascending colon polyps-fragment of colonic mucosa with features of serrated polyp. Rare small lymphoid nodule. #Modified barium swallow 2/18/2020: Mild oral and moderate pharyngeal dysphagia. SUMMARY OF EVALUATION  DYSPHAGIA DIAGNOSIS:  Mild-moderate oral phase dysphagia and moderate pharyngeal phase dysphagia     DIET RECOMMENDATIONS: minced and moist consistencies with thin liquids and NO MIXED CONSISTENCIES      FEEDING RECOMMENDATIONS:   No assistance required  Multiple swallows  Feed in upright position  Small bites/sips  Remain upright after meals  Eat/Feed at a slow rate     THERAPY RECOMMENDATIONS:   Therapy is recommended to:  Assess tolerance of recommended diet  Improve tongue base retraction  Improve laryngeal strength and range of motion      Pt would also benefit from a Speech-Language Evaluation in order to assess for dysphonia and treat, if indicated. Pt was very difficult to hear during conversation secondary to low vocal volume. Pt may be a good candidate for [facility] LOUD Program at the outpatient level. #Procedure: Esophageal Manometry Examination Date: 07/15/2019    Lower Esophageal Sphincter Region Normal    Landmarks        Proximal LES (from nares)(cm) 42.6        LES length(cm) 0.8 2.7-4.8       Esophageal length (LES-UES centers)(cm) 24.3        Intraabdominal LES length(cm) 0.0        Hiatal hernia? Yes, 1.2     LES Pressures        Pressure faustina. method eSleeve,IRP        Basal (respiratory mean)(mmHg) -1.8 13-43       Residual (median)(mmHg) -9.0 <15.0        Upper Esophageal Sphincter Normal    Mean basal pressure(mmHg) 26.2     Mean residual pressure(mmHg) 6.8 <12.0        Esophageal Motility Normal    Number of swallows evaluated 10     [place] Classification        % failed 0        % weak 0        % ineffective 0        % panesophageal pressurization 0        % premature contraction 0        % fragmented 20        % intact 80        Number of hypercontractile swallows 0         Pharyngeal / UES Motility Normal    No. swallows evaluated 10     Evaluated @ 2.0 & 3.0 above UES        Mean peak pressure(mmHg) 24.9         Interpretation / Findings:   --Normal LESP and complete relaxation. --Normal peristalsis in all swallows. Impressions: Normal Esophageal manometry     #EGD: 7/2019  Normal esophagus. Biopsied.                       - Z-line regular, 40 cm from the incisors. - LA Grade A reflux esophagitis. - 3 cm hiatal hernia. - Normal stomach. Biopsied.                       - Normal examined duodenum. Biopsied     #Gastric emptying study: 6/11/2019: Normal     #Modified barium swallow: 3/27/2019: Mild oral and pharyngeal dysphagia     #Small bowel GI series: June 2018: Normal small bowel series with contrast entering the colon in 60 minutes. No inflammation of the small bowel is seen. #CT abdomen: 4/21/2018: Multiple prominent fluid-filled loops of small bowel with a few scattered air-fluid levels. Distal small bowel is decompressed. Findings may represent that of a partial small bowel obstruction. #Colonoscopy: 4/3/2017: Sigmoid diverticulosis otherwise normal     #Mesentric artery duplex: 9/20/2016  IMPRESSION: Compared to prior study of 1/5/2016, no change.   AORTA: Aorta plaque noted without evidence of hemodynamically significant stenosis at distal  MESENTERIC VESSELS:  Celiac: Dynamic elevated velocities due to median arcuate ligament compression. Hepatic: Patent. Splenic: Patent. Superior mesenteric artery: 0-69% stenosis. No evidence of hemodynamically significant stenosis. Inferior mesenteric artery: 0-69% stenosis. No evidence of hemodynamically significant stenosis. #EGD: 5/19/2014: Hiatal hernia otherwise normal  #Colonoscopy: 12/10/2012: Sigmoid diverticulosis, otherwise normal    Review of Systems   All other systems reviewed and are negative. Past medical history, past surgical history, medication list, social and familyhistory reviewed    Blood pressure 122/68, pulse 61, height 5' 5\" (1.651 m), weight 157 lb (71.2 kg), SpO2 100 %. Physical Exam  Constitutional:       General: She is not in acute distress. Appearance: Normal appearance. She is normal weight. She is not ill-appearing. HENT:      Head: Normocephalic and atraumatic. Eyes:      General: No scleral icterus. Cardiovascular:      Rate and Rhythm: Normal rate and regular rhythm. Pulses: Normal pulses. Pulmonary:      Effort: Pulmonary effort is normal. No respiratory distress. Breath sounds: Normal breath sounds. Abdominal:      General: Bowel sounds are normal. There is no distension. Palpations: Abdomen is soft. There is no mass. Tenderness: There is no abdominal tenderness. There is no guarding or rebound. Musculoskeletal:         General: Normal range of motion. Lymphadenopathy:      Cervical: No cervical adenopathy. Skin:     General: Skin is warm and dry. Coloration: Skin is not jaundiced. Neurological:      Mental Status: She is alert and oriented to person, place, and time. Psychiatric:         Mood and Affect: Mood normal.         Behavior: Behavior normal.         Thought Content:  Thought content normal.         Judgment: Judgment normal.     Laboratory, Pathology, Radiology reviewed in detail with relevantimportant investigations summarized below:    No results for input(s): WBC, HGB, HCT, MCV, PLT in the last 720 hours. Lab Results   Component Value Date    WBC 7.2 12/09/2021    HGB 13.0 12/09/2021    HCT 39.5 12/09/2021    MCV 92.0 12/09/2021     12/09/2021     Lab Results   Component Value Date    ALT 19 06/30/2021    AST 26 06/30/2021    ALKPHOS 89 06/30/2021    BILITOT 0.5 06/30/2021     XR ABDOMEN (complete, including decubitus and/or erect views)  Result Date: 1/12/2022  Nonobstructive, nonspecific bowel gas pattern    Assessment and Plan:  Alejandrina Banegas 76 y.o. female with:    Diarrhea, unspecified type   -Patient with resolution of diarrheal symptoms up until 6 weeks ago. Current clinical history suspicious for constipation with overflow diarrhea. -Recommend Metamucil 1 spoonful daily in a glass of water.  -Colace 100 mg by mouth daily  -Will assess response at follow-up, if diarrhea persists, will order stool studies given history of Campylobacter infection in the past.     2. Gastroesophageal reflux disease without esophagitis  -Trial of Nexium 40 mg by mouth daily  -Add Pepcid 20 mg by mouth twice daily as needed  -Continue antireflux measures     3. Pharyngoesophageal dysphagia  -Patient with continued dysphagia despite EGD with empiric dilation earlier this year, no improvement with dilation. Suspect patient's symptoms in relation to known movement disorder versus other etiology. Prior history of modified barium swallow revealing moderate pharyngeal dysphagia. Patient was following with speech therapy up until 2/2020. Patient did not follow-up after COVID-19 pandemic.  -Recommend repeat modified barium swallow and follow-up/treatment per speech therapy recommendations  -Follow-up with ENT physician as scheduled  -Further recommendations, including repeat upper endoscopy, pending patient's clinical course/results.  May consider referral to tertiary center for continued evaluation/treatment. Return in about 6 weeks (around 12/2/2022). SADAF Sims - CNP   Staff Gastroenterology Nurse Practitioner  [facility]    Please note this report has been partially produced using speech recognition software and contain errors related to that system including grammar, punctuation and spelling as well as words and phrases that may seem inappropriate. If there are questions or concerns please feel free to contact me to clarify.

## 2022-10-27 ENCOUNTER — TELEPHONE (OUTPATIENT)
Dept: GASTROENTEROLOGY | Age: 77
End: 2022-10-27

## 2022-10-27 DIAGNOSIS — R13.14 PHARYNGOESOPHAGEAL DYSPHAGIA: Primary | ICD-10-CM

## 2022-10-27 NOTE — TELEPHONE ENCOUNTER
I want to make sure she still is evaluated and starts following with speech therapy like she was back in 2020 before the pandemic started. I will re-order a referral to speech therapy so they can evaluate/treat and make recommendations about another swallowing test. Please relay this information to the patient. Thank you.

## 2022-10-31 NOTE — TELEPHONE ENCOUNTER
The patient was informed, she is still having trouble swallowing, but does not want to have any test done at this time.

## 2023-04-25 ENCOUNTER — TELEPHONE (OUTPATIENT)
Dept: FAMILY MEDICINE CLINIC | Age: 78
End: 2023-04-25

## 2023-06-19 ENCOUNTER — OFFICE VISIT (OUTPATIENT)
Dept: CARDIOLOGY CLINIC | Age: 78
End: 2023-06-19
Payer: MEDICARE

## 2023-06-19 VITALS
SYSTOLIC BLOOD PRESSURE: 118 MMHG | DIASTOLIC BLOOD PRESSURE: 70 MMHG | HEART RATE: 77 BPM | BODY MASS INDEX: 24.1 KG/M2 | WEIGHT: 144.8 LBS | OXYGEN SATURATION: 94 %

## 2023-06-19 DIAGNOSIS — R13.10 DYSPHAGIA, UNSPECIFIED TYPE: ICD-10-CM

## 2023-06-19 DIAGNOSIS — I65.21 STENOSIS OF RIGHT CAROTID ARTERY: ICD-10-CM

## 2023-06-19 DIAGNOSIS — G47.33 OBSTRUCTIVE SLEEP APNEA SYNDROME: ICD-10-CM

## 2023-06-19 DIAGNOSIS — I10 ESSENTIAL HYPERTENSION: Primary | ICD-10-CM

## 2023-06-19 DIAGNOSIS — R09.89 BILATERAL CAROTID BRUITS: ICD-10-CM

## 2023-06-19 DIAGNOSIS — E78.5 DYSLIPIDEMIA: ICD-10-CM

## 2023-06-19 PROCEDURE — 1123F ACP DISCUSS/DSCN MKR DOCD: CPT | Performed by: INTERNAL MEDICINE

## 2023-06-19 PROCEDURE — G8427 DOCREV CUR MEDS BY ELIG CLIN: HCPCS | Performed by: INTERNAL MEDICINE

## 2023-06-19 PROCEDURE — G8420 CALC BMI NORM PARAMETERS: HCPCS | Performed by: INTERNAL MEDICINE

## 2023-06-19 PROCEDURE — G8399 PT W/DXA RESULTS DOCUMENT: HCPCS | Performed by: INTERNAL MEDICINE

## 2023-06-19 PROCEDURE — 1036F TOBACCO NON-USER: CPT | Performed by: INTERNAL MEDICINE

## 2023-06-19 PROCEDURE — 99214 OFFICE O/P EST MOD 30 MIN: CPT | Performed by: INTERNAL MEDICINE

## 2023-06-19 PROCEDURE — 1090F PRES/ABSN URINE INCON ASSESS: CPT | Performed by: INTERNAL MEDICINE

## 2023-06-19 PROCEDURE — 3078F DIAST BP <80 MM HG: CPT | Performed by: INTERNAL MEDICINE

## 2023-06-19 PROCEDURE — 3074F SYST BP LT 130 MM HG: CPT | Performed by: INTERNAL MEDICINE

## 2023-06-19 RX ORDER — ESTRADIOL 0.1 MG/G
2 CREAM VAGINAL DAILY
COMMUNITY

## 2023-06-19 RX ORDER — CITALOPRAM 20 MG/1
20 TABLET ORAL DAILY
COMMUNITY

## 2023-06-19 RX ORDER — CHOLECALCIFEROL (VITAMIN D3) 125 MCG
500 CAPSULE ORAL DAILY
COMMUNITY
End: 2023-06-19 | Stop reason: ALTCHOICE

## 2023-06-19 RX ORDER — ACETAMINOPHEN 325 MG/1
650 TABLET ORAL EVERY 6 HOURS PRN
COMMUNITY

## 2023-06-19 RX ORDER — PANTOPRAZOLE SODIUM 40 MG/1
40 TABLET, DELAYED RELEASE ORAL 2 TIMES DAILY
COMMUNITY

## 2023-06-19 ASSESSMENT — ENCOUNTER SYMPTOMS
WHEEZING: 0
EYES NEGATIVE: 1
NAUSEA: 0
STRIDOR: 0
COUGH: 0
GASTROINTESTINAL NEGATIVE: 1
CHEST TIGHTNESS: 0
BLOOD IN STOOL: 0
SHORTNESS OF BREATH: 1

## 2023-06-19 NOTE — PROGRESS NOTES
Subsequent Progress Note  Patient: Vangie Beyer  YOB: 1945  MRN: [id number]    Chief Complaint: CP BEATRICE HTN VAZQUEZ dizzy  Chief Complaint   Patient presents with    Follow-up     LOV: 8/2022    Hypertension    Fall     Happened a few weeks ago; has been having issues w/ balance recently- no injuries        CV Data:  2010 Mesenteric Thrombus - treated with Warfarin. 1/2020 spect negative. 1/2020 echo ef 60  1/2020 CUS mild   2/21CUS mild   12/21 spect Inferior apical ischemia  12/21 Echo EF 65%  1/22 Cath Normal CORS. EDP 4 LVEF 55-60    Subjective/HPI: recent in hosp for CP. No ACS and released. She still has CP sometimes related to eating other times not. She has signficant VAZQUEZ with walking. Getting worse. Has BEATRICE but not using CPAP as it leaks a lot. 1/30/2020 fell this past Saturday while picking up dog waste. No major injuries. She walked in here today. She has been dizzy lately. 9/2/2020 still dizzy but no further falls no bleed. Takes meds. stopped asa. . she has developed Dysphagia and choking with solids and liquids. 1/4/21 no cp no sob no falls no bleed no falls decreased appetite. Life  Long nonsmoker    5/4/21 no cp no sob no falls no bleed. Had bladde simulator for incontinence but not effective    10/29/21 now having burning cp and diaphoresis no radiation. Still has vazquez. No bleed no falls takes meds. 12/9/21 still with burning CP and SOB. Subsides with rest not dizzy. no bleed. 2/14/22 had Esophagus dilted. Feels better. occ cp related to food. Cath negative    8/17/22 doing well no cp no sob no falls no bleed not very active. Still has dyphagia. 6/19/23 fell due to balance no severe injury now using a meet. She has arthritis of neck but can;t get MRI due to Right ear cochlear implant. No cp no sob       No ETOH  Retired- .    +FH  Nonsmoker  Lives w     EKG: SR 80    Past Medical History:   Diagnosis Date    Anticoagulant

## 2024-01-02 ENCOUNTER — OFFICE VISIT (OUTPATIENT)
Dept: ORTHOPEDIC SURGERY | Age: 79
End: 2024-01-02
Payer: MEDICARE

## 2024-01-02 VITALS
WEIGHT: 148 LBS | BODY MASS INDEX: 26.22 KG/M2 | HEART RATE: 79 BPM | HEIGHT: 63 IN | TEMPERATURE: 97.3 F | OXYGEN SATURATION: 98 %

## 2024-01-02 DIAGNOSIS — M17.0 PRIMARY OSTEOARTHRITIS OF BOTH KNEES: Primary | ICD-10-CM

## 2024-01-02 PROCEDURE — 1036F TOBACCO NON-USER: CPT | Performed by: PHYSICIAN ASSISTANT

## 2024-01-02 PROCEDURE — 1090F PRES/ABSN URINE INCON ASSESS: CPT | Performed by: PHYSICIAN ASSISTANT

## 2024-01-02 PROCEDURE — 1123F ACP DISCUSS/DSCN MKR DOCD: CPT | Performed by: PHYSICIAN ASSISTANT

## 2024-01-02 PROCEDURE — G8419 CALC BMI OUT NRM PARAM NOF/U: HCPCS | Performed by: PHYSICIAN ASSISTANT

## 2024-01-02 PROCEDURE — G8427 DOCREV CUR MEDS BY ELIG CLIN: HCPCS | Performed by: PHYSICIAN ASSISTANT

## 2024-01-02 PROCEDURE — 99203 OFFICE O/P NEW LOW 30 MIN: CPT | Performed by: PHYSICIAN ASSISTANT

## 2024-01-02 PROCEDURE — 20610 DRAIN/INJ JOINT/BURSA W/O US: CPT | Performed by: PHYSICIAN ASSISTANT

## 2024-01-02 PROCEDURE — G8484 FLU IMMUNIZE NO ADMIN: HCPCS | Performed by: PHYSICIAN ASSISTANT

## 2024-01-02 PROCEDURE — G8399 PT W/DXA RESULTS DOCUMENT: HCPCS | Performed by: PHYSICIAN ASSISTANT

## 2024-01-02 RX ORDER — LIDOCAINE HYDROCHLORIDE 10 MG/ML
8 INJECTION, SOLUTION INFILTRATION; PERINEURAL ONCE
Status: COMPLETED | OUTPATIENT
Start: 2024-01-02 | End: 2024-01-02

## 2024-01-02 RX ORDER — TRIAMCINOLONE ACETONIDE 40 MG/ML
80 INJECTION, SUSPENSION INTRA-ARTICULAR; INTRAMUSCULAR ONCE
Status: COMPLETED | OUTPATIENT
Start: 2024-01-02 | End: 2024-01-02

## 2024-01-02 RX ADMIN — TRIAMCINOLONE ACETONIDE 80 MG: 40 INJECTION, SUSPENSION INTRA-ARTICULAR; INTRAMUSCULAR at 16:28

## 2024-01-02 RX ADMIN — LIDOCAINE HYDROCHLORIDE 8 ML: 10 INJECTION, SOLUTION INFILTRATION; PERINEURAL at 16:27

## 2024-01-02 ASSESSMENT — ENCOUNTER SYMPTOMS
RESPIRATORY NEGATIVE: 1
GASTROINTESTINAL NEGATIVE: 1
EYES NEGATIVE: 1

## 2024-01-02 NOTE — PROGRESS NOTES
Malka Jones (:  1945) is a 78 y.o. female,New patient, here for evaluation of the following chief complaint(s):  Cyst (Bakers cyst on the left knee) and Knee Pain (Right knee pain, states she feels a \"pop\" when she walks)         ASSESSMENT/PLAN:  1. Primary osteoarthritis of both knees  -     DC ARTHROCENTESIS ASPIR&/INJ MAJOR JT/BURSA W/O US  -     lidocaine 1 % injection 8 mL; 8 mL, Intra-artICUlar, ONCE, 1 dose, On 24 at 1545  -     triamcinolone acetonide (KENALOG-40) injection 80 mg; 80 mg, Intra-artICUlar, ONCE, 1 dose, On 24 at 1545      No follow-ups on file.         Subjective   SUBJECTIVE/OBJECTIVE:  This is an 78-year-old female complaining of bilateral knee pain.  She denies any injury.  She states she has swelling behind her left knee.  States her right knee clicks when she walks.  She denies any injury.  She denies instability or locking.        Review of Systems   Constitutional: Negative.    HENT: Negative.     Eyes: Negative.    Respiratory: Negative.     Gastrointestinal: Negative.    Genitourinary: Negative.    Musculoskeletal: Negative.    Psychiatric/Behavioral: Negative.            Objective   Physical Exam  Constitutional:       Appearance: Normal appearance.   HENT:      Head: Normocephalic and atraumatic.      Mouth/Throat:      Mouth: Mucous membranes are moist.   Eyes:      Extraocular Movements: Extraocular movements intact.   Musculoskeletal:      Cervical back: Normal range of motion.      Comments: Right knee-joint effusion present, small popliteal cyst.  No warmth, erythema, fluctuance or sign of infection.  Full extension, flexion symmetrical 120 degrees.  Knee joint is stable, there is no laxity with valgus or varus stress.  Tenderness with palpation at the medial femoral condyle.  Patellofemoral crepitus with range of motion.  Calf is soft and nontender.    Left knee-joint effusion present.  Moderate popliteal cyst.  No warmth, erythema, fluctuance

## 2024-01-09 ENCOUNTER — OFFICE VISIT (OUTPATIENT)
Dept: ORTHOPEDIC SURGERY | Age: 79
End: 2024-01-09
Payer: MEDICARE

## 2024-01-09 VITALS
HEART RATE: 80 BPM | OXYGEN SATURATION: 98 % | BODY MASS INDEX: 26.22 KG/M2 | WEIGHT: 148 LBS | HEIGHT: 63 IN | TEMPERATURE: 97.7 F

## 2024-01-09 DIAGNOSIS — M17.0 PRIMARY OSTEOARTHRITIS OF BOTH KNEES: Primary | ICD-10-CM

## 2024-01-09 PROCEDURE — 20610 DRAIN/INJ JOINT/BURSA W/O US: CPT | Performed by: PHYSICIAN ASSISTANT

## 2024-01-09 PROCEDURE — 99999 PR OFFICE/OUTPT VISIT,PROCEDURE ONLY: CPT | Performed by: PHYSICIAN ASSISTANT

## 2024-01-09 RX ORDER — KETOROLAC TROMETHAMINE 30 MG/ML
60 INJECTION, SOLUTION INTRAMUSCULAR; INTRAVENOUS ONCE
Status: COMPLETED | OUTPATIENT
Start: 2024-01-09 | End: 2024-01-09

## 2024-01-09 RX ADMIN — KETOROLAC TROMETHAMINE 60 MG: 30 INJECTION, SOLUTION INTRAMUSCULAR; INTRAVENOUS at 16:40

## 2024-01-09 NOTE — PROGRESS NOTES
Time Out  [x] Patient Verified  [x] Site Verified  [x] Laterality Verified  [x] Procedure Verified    Before aspiration/injection risks/benefits of a NSAID injection including infection, local skin irritation, skin atrophy, continued pain/discomfort, elevated blood sugar, burning, failure to relieve pain, possible late infection were discussed with patient.   Patient verbalized understanding and wanted to proceed with planned procedure.    After prepping and draping the right knee in the usual sterile fashion,  5 cc 1% lidocaine and 2 cc 30 mg/mL Toradol  were injected intra-articularly after aspirating 11 clear yellow joint fluid without any complications.  Patient tolerated this well.    Post-procedure discomfort can be alleviated with additional medications/ice/elevation/rest over the first 24 hours as recommended.     The patient tolerated the procedure well.    If fluid was aspirated that was abnormal it was sent for further studies  in sterile specimen container as ordered to the lab     Diagnosis Orders   1. Primary osteoarthritis of both knees  MI ARTHROCENTESIS ASPIR&/INJ MAJOR JT/BURSA W/O US    ketorolac (TORADOL) injection 60 mg

## 2024-02-16 ENCOUNTER — TELEPHONE (OUTPATIENT)
Dept: FAMILY MEDICINE CLINIC | Age: 79
End: 2024-02-16

## 2024-03-19 NOTE — PROGRESS NOTES
Danna Oconnor MD at [facility]     Family History   Problem Relation Age of Onset    Crohn's Disease Sister     Colon Cancer Neg Hx        Allergies   Allergen Reactions    Latex     Amoxicillin-Pot Clavulanate      Other reaction(s): Vomiting    Carbidopa-Levodopa      Other reaction(s): Unknown  Effects the eyes    Iv [Iodides]     Morphine Other (See Comments)     Made her go 'berserk '    Nitrofurantoin Other (See Comments)     Myalgia    Plavix [Clopidogrel Bisulfate]     Pravastatin Other (See Comments)     Myalgia       Current Outpatient Medications on File Prior to Visit   Medication Sig Dispense Refill    atorvastatin (LIPITOR) 20 MG tablet Take 1 tablet by mouth daily 90 tablet 3    pantoprazole (PROTONIX) 40 MG tablet Take 1 tablet by mouth 2 times daily (before meals) 60 tablet 5    fluticasone (FLONASE) 50 MCG/ACT nasal spray 1 spray by Nasal route 2 times daily 1 Bottle 3    citalopram (CELEXA) 20 MG tablet Take 1 tablet by mouth daily 16 tablet 0    prednisoLONE acetate (PRED FORTE) 1 % ophthalmic suspension INSTILL 1 DROP INTO RIGHT EYE 6 TIMES A DAY FOR 2 DAYS THEN DECREASE TO 4 TIMES A DAY      primidone (MYSOLINE) 50 MG tablet Take 0.5 tablets by mouth nightly 16 tablet 3    aspirin EC 81 MG EC tablet Take 1 tablet by mouth daily 90 tablet 3    Wheat Dextrin (BENEFIBER) POWD Start with 2 to 3 tsp daily with 8 oz of water, titrate based on stool consistency, titrate down based on bloating & abdominal cramps 1 Can 3     No current facility-administered medications on file prior to visit. Review of Systems   Constitutional: Negative for chills, diaphoresis, fatigue and fever. HENT: Negative for congestion, dental problem, drooling, ear discharge, ear pain, facial swelling, hearing loss, mouth sores, nosebleeds, postnasal drip, rhinorrhea, sinus pressure, sinus pain, sneezing, sore throat, tinnitus, trouble swallowing and voice change.     Eyes: Negative for photophobia, pain, discharge, redness, itching and visual disturbance. Respiratory: Negative for apnea, cough, chest tightness, shortness of breath and wheezing. Cardiovascular: Negative for chest pain, palpitations and leg swelling. Gastrointestinal: Negative for abdominal distention, abdominal pain, blood in stool, constipation, diarrhea, nausea, rectal pain and vomiting. Endocrine: Negative for cold intolerance, heat intolerance, polydipsia, polyphagia and polyuria. Genitourinary: Negative for decreased urine volume, difficulty urinating, dysuria, flank pain, frequency, genital sores, hematuria, pelvic pain, urgency (pressure), vaginal bleeding, vaginal discharge and vaginal pain. Musculoskeletal: Negative for arthralgias, back pain, gait problem, joint swelling, myalgias, neck pain and neck stiffness. Skin: Negative for color change, rash and wound. Allergic/Immunologic: Negative for environmental allergies and food allergies. Neurological: Negative for dizziness, tremors, seizures, syncope, facial asymmetry, speech difficulty, weakness, light-headedness, numbness and headaches. Hematological: Negative for adenopathy. Does not bruise/bleed easily. Psychiatric/Behavioral: Negative for agitation, confusion, decreased concentration, hallucinations, self-injury, sleep disturbance and suicidal ideas. The patient is not nervous/anxious. Objective    Vitals:    06/29/21 1252   BP: 138/80   Pulse: 72   Resp: 14   Temp: 98 °F (36.7 °C)   SpO2: 98%   Weight: 154 lb (69.9 kg)   Height: 5' 5\" (1.651 m)       Physical Exam  Vitals reviewed. Constitutional:       General: She is not in acute distress. Appearance: She is well-developed. She is not ill-appearing or toxic-appearing. HENT:      Head: Normocephalic and atraumatic. Right Ear: Hearing and external ear normal.      Left Ear: Hearing and external ear normal.   Cardiovascular:      Rate and Rhythm: Normal rate and regular rhythm. Patient is a 62y old  Male who presents with a chief complaint of Acute decompensated heart failure (19 Mar 2024 13:13)      INTERVAL HPI/OVERNIGHT EVENTS: scheduled for AICD implant today   T(C): 36.4 (03-19-24 @ 20:37), Max: 36.5 (03-19-24 @ 04:50)  HR: 73 (03-19-24 @ 20:37) (71 - 73)  BP: 112/75 (03-19-24 @ 20:37) (112/75 - 127/85)  RR: 18 (03-19-24 @ 20:37) (17 - 18)  SpO2: 95% (03-19-24 @ 20:37) (95% - 100%)  Wt(kg): --  I&O's Summary    18 Mar 2024 07:01  -  19 Mar 2024 07:00  --------------------------------------------------------  IN: 0 mL / OUT: 1250 mL / NET: -1250 mL        PAST MEDICAL & SURGICAL HISTORY:  HTN (hypertension)      HLD (hyperlipidemia)      DM (diabetes mellitus)      HFrEF (heart failure with reduced ejection fraction)      No significant past surgical history          SOCIAL HISTORY  Alcohol:  Tobacco:  Illicit substance use:    FAMILY HISTORY:    REVIEW OF SYSTEMS:  CONSTITUTIONAL: No fever, weight loss, or fatigue  EYES: No eye pain, visual disturbances, or discharge  ENMT:  No difficulty hearing, tinnitus, vertigo; No sinus or throat pain  NECK: No pain or stiffness  RESPIRATORY: No cough, wheezing, chills or hemoptysis; No shortness of breath  CARDIOVASCULAR: No chest pain, palpitations, dizziness, or leg swelling  GASTROINTESTINAL: No abdominal or epigastric pain. No nausea, vomiting, or hematemesis; No diarrhea or constipation. No melena or hematochezia.  GENITOURINARY: No dysuria, frequency, hematuria, or incontinence  NEUROLOGICAL: No headaches, memory loss, loss of strength, numbness, or tremors  SKIN: No itching, burning, rashes, or lesions   LYMPH NODES: No enlarged glands  ENDOCRINE: No heat or cold intolerance; No hair loss  MUSCULOSKELETAL: No joint pain or swelling; No muscle, back, or extremity pain  PSYCHIATRIC: No depression, anxiety, mood swings, or difficulty sleeping  HEME/LYMPH: No easy bruising, or bleeding gums  ALLERY AND IMMUNOLOGIC: No hives or eczema    RADIOLOGY & ADDITIONAL TESTS:    Imaging Personally Reviewed:  [ ] YES  [ ] NO    Consultant(s) Notes Reviewed:  [ ] YES  [ ] NO    PHYSICAL EXAM:  GENERAL: NAD, well-groomed, well-developed  HEAD:  Atraumatic, Normocephalic  EYES: EOMI, PERRLA, conjunctiva and sclera clear  ENMT: No tonsillar erythema, exudates, or enlargement; Moist mucous membranes, Good dentition, No lesions  NECK: Supple, No JVD, Normal thyroid  NERVOUS SYSTEM:  Alert & Oriented X3, Good concentration; Motor Strength 5/5 B/L upper and lower extremities; DTRs 2+ intact and symmetric  CHEST/LUNG: Clear to percussion bilaterally; No rales, rhonchi, wheezing, or rubs  HEART: Regular rate and rhythm; No murmurs, rubs, or gallops  ABDOMEN: Soft, Nontender, Nondistended; Bowel sounds present  EXTREMITIES:  2+ Peripheral Pulses, No clubbing, cyanosis, or edema  LYMPH: No lymphadenopathy noted  SKIN: No rashes or lesions    LABS:                        12.4   4.85  )-----------( 259      ( 19 Mar 2024 06:55 )             37.6     03-19    138  |  91<L>  |  53<H>  ----------------------------<  108<H>  3.7   |  37<H>  |  1.36<H>    Ca    9.3      19 Mar 2024 06:55  Phos  3.7     03-19  Mg     2.10     03-19        Urinalysis Basic - ( 19 Mar 2024 06:55 )    Color: x / Appearance: x / SG: x / pH: x  Gluc: 108 mg/dL / Ketone: x  / Bili: x / Urobili: x   Blood: x / Protein: x / Nitrite: x   Leuk Esterase: x / RBC: x / WBC x   Sq Epi: x / Non Sq Epi: x / Bacteria: x      CAPILLARY BLOOD GLUCOSE      POCT Blood Glucose.: 195 mg/dL (19 Mar 2024 22:49)  POCT Blood Glucose.: 128 mg/dL (19 Mar 2024 17:49)  POCT Blood Glucose.: 109 mg/dL (19 Mar 2024 14:16)  POCT Blood Glucose.: 114 mg/dL (19 Mar 2024 08:46)  POCT Blood Glucose.: 105 mg/dL (19 Mar 2024 06:42)        Urinalysis Basic - ( 19 Mar 2024 06:55 )    Color: x / Appearance: x / SG: x / pH: x  Gluc: 108 mg/dL / Ketone: x  / Bili: x / Urobili: x   Blood: x / Protein: x / Nitrite: x   Leuk Esterase: x / RBC: x / WBC x   Sq Epi: x / Non Sq Epi: x / Bacteria: x        MEDICATIONS  (STANDING):  aspirin enteric coated 81 milliGRAM(s) Oral daily  ceFAZolin   IVPB 1000 milliGRAM(s) IV Intermittent every 8 hours  dextrose 5%. 1000 milliLiter(s) (50 mL/Hr) IV Continuous <Continuous>  dextrose 5%. 1000 milliLiter(s) (100 mL/Hr) IV Continuous <Continuous>  dextrose 50% Injectable 25 Gram(s) IV Push once  dextrose 50% Injectable 12.5 Gram(s) IV Push once  dextrose 50% Injectable 25 Gram(s) IV Push once  glucagon  Injectable 1 milliGRAM(s) IntraMuscular once  influenza   Vaccine 0.5 milliLiter(s) IntraMuscular once  insulin lispro (ADMELOG) corrective regimen sliding scale   SubCutaneous three times a day before meals  insulin lispro (ADMELOG) corrective regimen sliding scale   SubCutaneous at bedtime  losartan 25 milliGRAM(s) Oral daily  metoprolol succinate  milliGRAM(s) Oral daily  potassium chloride    Tablet ER 40 milliEquivalent(s) Oral once  spironolactone 50 milliGRAM(s) Oral daily    MEDICATIONS  (PRN):  acetaminophen     Tablet .. 650 milliGRAM(s) Oral every 6 hours PRN Temp greater or equal to 38C (100.4F), Mild Pain (1 - 3)  aluminum hydroxide/magnesium hydroxide/simethicone Suspension 30 milliLiter(s) Oral every 4 hours PRN Dyspepsia  dextrose Oral Gel 15 Gram(s) Oral once PRN Blood Glucose LESS THAN 70 milliGRAM(s)/deciliter  melatonin 3 milliGRAM(s) Oral at bedtime PRN Insomnia  ondansetron Injectable 4 milliGRAM(s) IV Push every 8 hours PRN Nausea and/or Vomiting      Care Discussed with Consultants/Other Providers [ ] YES  [ ] NO Pulmonary:      Effort: Pulmonary effort is normal. No respiratory distress. Breath sounds: Normal breath sounds. Abdominal:      General: Abdomen is flat. Bowel sounds are normal.      Palpations: Abdomen is soft. Tenderness: There is no abdominal tenderness. There is no right CVA tenderness or left CVA tenderness. Skin:     General: Skin is warm and dry. Neurological:      General: No focal deficit present. Mental Status: She is alert and oriented to person, place, and time. POC Testing Today:   No results found for this visit on 06/29/21. Assessment and Plan    Diagnoses and all orders for this visit:  Skin rash: Trial of nystatin      Depression, unspecified depression type        Post-menopausal-previously ordered a dexa scan          Essential tremor: Continue primodone to 50 mg daily         GERD:REFILLED PROTONIX        Positive depression screening    Previously referred to Avtar Kan MD, [place] and psychology          Return in about 3 months (around 9/29/2021).       Kenneth Alicea MD

## 2024-05-07 ENCOUNTER — OFFICE VISIT (OUTPATIENT)
Dept: ORTHOPEDIC SURGERY | Age: 79
End: 2024-05-07
Payer: MEDICARE

## 2024-05-07 VITALS
WEIGHT: 125 LBS | HEART RATE: 72 BPM | TEMPERATURE: 97.6 F | HEIGHT: 62 IN | OXYGEN SATURATION: 95 % | BODY MASS INDEX: 23 KG/M2

## 2024-05-07 DIAGNOSIS — M75.51 SUBACROMIAL BURSITIS OF RIGHT SHOULDER JOINT: ICD-10-CM

## 2024-05-07 DIAGNOSIS — M75.81 ROTATOR CUFF TENDINITIS, RIGHT: Primary | ICD-10-CM

## 2024-05-07 PROCEDURE — G8420 CALC BMI NORM PARAMETERS: HCPCS | Performed by: PHYSICIAN ASSISTANT

## 2024-05-07 PROCEDURE — G8399 PT W/DXA RESULTS DOCUMENT: HCPCS | Performed by: PHYSICIAN ASSISTANT

## 2024-05-07 PROCEDURE — 1090F PRES/ABSN URINE INCON ASSESS: CPT | Performed by: PHYSICIAN ASSISTANT

## 2024-05-07 PROCEDURE — 20610 DRAIN/INJ JOINT/BURSA W/O US: CPT | Performed by: PHYSICIAN ASSISTANT

## 2024-05-07 PROCEDURE — G8427 DOCREV CUR MEDS BY ELIG CLIN: HCPCS | Performed by: PHYSICIAN ASSISTANT

## 2024-05-07 PROCEDURE — 1036F TOBACCO NON-USER: CPT | Performed by: PHYSICIAN ASSISTANT

## 2024-05-07 PROCEDURE — 1123F ACP DISCUSS/DSCN MKR DOCD: CPT | Performed by: PHYSICIAN ASSISTANT

## 2024-05-07 PROCEDURE — 99214 OFFICE O/P EST MOD 30 MIN: CPT | Performed by: PHYSICIAN ASSISTANT

## 2024-05-07 RX ORDER — TRIAMCINOLONE ACETONIDE 40 MG/ML
80 INJECTION, SUSPENSION INTRA-ARTICULAR; INTRAMUSCULAR ONCE
Status: COMPLETED | OUTPATIENT
Start: 2024-05-07 | End: 2024-05-07

## 2024-05-07 RX ORDER — LIDOCAINE HYDROCHLORIDE 10 MG/ML
8 INJECTION, SOLUTION INFILTRATION; PERINEURAL ONCE
Status: COMPLETED | OUTPATIENT
Start: 2024-05-07 | End: 2024-05-07

## 2024-05-07 RX ADMIN — TRIAMCINOLONE ACETONIDE 80 MG: 40 INJECTION, SUSPENSION INTRA-ARTICULAR; INTRAMUSCULAR at 09:51

## 2024-05-07 RX ADMIN — LIDOCAINE HYDROCHLORIDE 8 ML: 10 INJECTION, SOLUTION INFILTRATION; PERINEURAL at 09:52

## 2024-05-07 ASSESSMENT — ENCOUNTER SYMPTOMS
GASTROINTESTINAL NEGATIVE: 1
EYES NEGATIVE: 1
RESPIRATORY NEGATIVE: 1

## 2024-05-07 NOTE — PROGRESS NOTES
Malka Jones (:  1945) is a 79 y.o. female,Established patient, here for evaluation of the following chief complaint(s):  Shoulder Pain (Patient presents today for right shoulder injury. Patient states she fell trying to go up the stairs approximately 6 months ago, and landed on her shoulder. Pain is 6/10 currently. 10/10 at its worst. )      Assessment & Plan   1. Rotator cuff tendinitis, right  -     VT ARTHROCENTESIS ASPIR&/INJ MAJOR JT/BURSA W/O US  -     lidocaine 1 % injection 8 mL; 8 mL, Intra-artICUlar, ONCE, 1 dose, On Tue 24 at 1000  -     triamcinolone acetonide (KENALOG-40) injection 80 mg; 80 mg, Intra-artICUlar, ONCE, 1 dose, On e 24 at 1000  2. Subacromial bursitis of right shoulder joint  -     VT ARTHROCENTESIS ASPIR&/INJ MAJOR JT/BURSA W/O US  -     lidocaine 1 % injection 8 mL; 8 mL, Intra-artICUlar, ONCE, 1 dose, On Tue 24 at 1000  -     triamcinolone acetonide (KENALOG-40) injection 80 mg; 80 mg, Intra-artICUlar, ONCE, 1 dose, On 24 at 1000      No follow-ups on file.       Subjective   This is a pleasant 79-year-old right-hand-dominant female.  She states she fell about 6 months ago at home landing on her right arm.  She developed right shoulder pain shortly thereafter.  She was seen at [facility] orthopedics where x-rays did not show any fracture.  Musculoskeletal ultrasound of the right shoulder does not show any complete rotator cuff tear.  Patient states she has attended physical therapy for range of motion exercises.  She has improved somewhat but continues to have pain with certain movements.  She denies change in sensation of the right arm.        Review of Systems   Constitutional: Negative.    HENT: Negative.     Eyes: Negative.    Respiratory: Negative.     Gastrointestinal: Negative.    Genitourinary: Negative.    Musculoskeletal: Negative.    Psychiatric/Behavioral: Negative.            Objective   Physical Exam  Constitutional:

## 2024-05-21 ENCOUNTER — OFFICE VISIT (OUTPATIENT)
Dept: CARDIOLOGY CLINIC | Age: 79
End: 2024-05-21
Payer: MEDICARE

## 2024-05-21 VITALS
SYSTOLIC BLOOD PRESSURE: 136 MMHG | DIASTOLIC BLOOD PRESSURE: 88 MMHG | HEART RATE: 92 BPM | OXYGEN SATURATION: 95 % | BODY MASS INDEX: 25.79 KG/M2 | WEIGHT: 141 LBS

## 2024-05-21 DIAGNOSIS — G47.33 OBSTRUCTIVE SLEEP APNEA SYNDROME: ICD-10-CM

## 2024-05-21 DIAGNOSIS — I10 ESSENTIAL HYPERTENSION: ICD-10-CM

## 2024-05-21 DIAGNOSIS — I77.3 ARTERIAL FIBROMUSCULAR DYSPLASIA (HCC): ICD-10-CM

## 2024-05-21 DIAGNOSIS — R09.89 BILATERAL CAROTID BRUITS: Primary | ICD-10-CM

## 2024-05-21 DIAGNOSIS — R13.10 DYSPHAGIA, UNSPECIFIED TYPE: ICD-10-CM

## 2024-05-21 DIAGNOSIS — I65.21 STENOSIS OF RIGHT CAROTID ARTERY: ICD-10-CM

## 2024-05-21 PROCEDURE — G8419 CALC BMI OUT NRM PARAM NOF/U: HCPCS | Performed by: INTERNAL MEDICINE

## 2024-05-21 PROCEDURE — 3075F SYST BP GE 130 - 139MM HG: CPT | Performed by: INTERNAL MEDICINE

## 2024-05-21 PROCEDURE — G8427 DOCREV CUR MEDS BY ELIG CLIN: HCPCS | Performed by: INTERNAL MEDICINE

## 2024-05-21 PROCEDURE — 99214 OFFICE O/P EST MOD 30 MIN: CPT | Performed by: INTERNAL MEDICINE

## 2024-05-21 PROCEDURE — 1090F PRES/ABSN URINE INCON ASSESS: CPT | Performed by: INTERNAL MEDICINE

## 2024-05-21 PROCEDURE — 3079F DIAST BP 80-89 MM HG: CPT | Performed by: INTERNAL MEDICINE

## 2024-05-21 PROCEDURE — 1123F ACP DISCUSS/DSCN MKR DOCD: CPT | Performed by: INTERNAL MEDICINE

## 2024-05-21 PROCEDURE — 1036F TOBACCO NON-USER: CPT | Performed by: INTERNAL MEDICINE

## 2024-05-21 PROCEDURE — G8399 PT W/DXA RESULTS DOCUMENT: HCPCS | Performed by: INTERNAL MEDICINE

## 2024-05-21 RX ORDER — PHENAZOPYRIDINE HYDROCHLORIDE 200 MG/1
TABLET, FILM COATED ORAL
COMMUNITY
Start: 2024-03-31

## 2024-05-21 RX ORDER — CEPHALEXIN 500 MG/1
500 CAPSULE ORAL 4 TIMES DAILY
COMMUNITY
Start: 2024-03-31

## 2024-05-21 ASSESSMENT — ENCOUNTER SYMPTOMS
EYES NEGATIVE: 1
WHEEZING: 0
CHEST TIGHTNESS: 0
COUGH: 0
GASTROINTESTINAL NEGATIVE: 1
NAUSEA: 0
SHORTNESS OF BREATH: 1
STRIDOR: 0
BLOOD IN STOOL: 0

## 2024-05-21 NOTE — PROGRESS NOTES
Diagnosis    Dysphagia    Arterial fibromuscular dysplasia (HCC)    Fibromyalgia    Gastroesophageal reflux disease    Obstructive sleep apnea syndrome    Peripheral neuropathy (HCC)    Varicose veins of lower extremity    Chest pain    BEATRICE (obstructive sleep apnea)    Stenosis of right carotid artery    Dyslipidemia    BEATRICE on CPAP    Essential hypertension    Dizziness of unknown etiology    Gastric polyp    Esophageal stricture    Acute cystitis with hematuria    Acute bacterial sinusitis    Frequency of urination    Sensorineural hearing loss (SNHL) of both ears    Generalized abdominal pain       There are no discontinued medications.      Modified Medications    No medications on file       No orders of the defined types were placed in this encounter.      Assessment/Plan:    1. Essential hypertension   stable     Postional dizizness. - stable - continue meds. Low salt diet.  We will dc Toprol XL 50 - we want to avoid falls.     2. Chest pain, unspecified type - cath negative.    3. BEATRICE (obstructive sleep apnea)  Will need Pul referral   Not using CPAP. --- did not see Pulm    4. Stenosis of right carotid artery - resume ASA w Food.     5. Dyslipidemia - pt stopped Atorvastatin. - will resume atorva 20. - f/u labs. Low fat diet.     6. GERD- try Protonix 40 bid - much improved.      7. Dysphagia - s/p Esophageal dilation. - acting up again may need dilatation. See Dr. Reid    8. Car brutis- CUS stable - f/u     9. Left Baker's Cyst - will refer to Ortho - was teated w injection.   Counseling:  Heart Healthy Lifestyle, Low Salt Diet, Take Precautions to Prevent Falls and Walk Daily    No follow-ups on file.      Electronically signed by SERENA ROSADO MD on 5/21/2024 at 3:39 PM

## 2024-05-29 ENCOUNTER — HOSPITAL ENCOUNTER (OUTPATIENT)
Dept: ULTRASOUND IMAGING | Age: 79
Discharge: HOME | End: 2024-05-31
Attending: INTERNAL MEDICINE
Payer: MEDICARE

## 2024-05-29 DIAGNOSIS — R09.89 BILATERAL CAROTID BRUITS: ICD-10-CM

## 2024-05-29 PROCEDURE — 93880 EXTRACRANIAL BILAT STUDY: CPT

## 2024-05-30 LAB
VAS LEFT CCA DIST EDV: 12.7 CM/S
VAS LEFT CCA DIST PSV: 60.6 CM/S
VAS LEFT CCA MID EDV: 27.3 CM/S
VAS LEFT CCA MID PSV: 102 CM/S
VAS LEFT CCA PROX EDV: 13.3 CM/S
VAS LEFT CCA PROX PSV: 84.5 CM/S
VAS LEFT ECA EDV: 5.64 CM/S
VAS LEFT ECA PSV: 40.5 CM/S
VAS LEFT ICA DIST EDV: 30.7 CM/S
VAS LEFT ICA DIST PSV: 109 CM/S
VAS LEFT ICA MID EDV: 24.7 CM/S
VAS LEFT ICA MID PSV: 101 CM/S
VAS LEFT ICA PROX EDV: 14.1 CM/S
VAS LEFT ICA PROX PSV: 45.2 CM/S
VAS LEFT ICA/CCA PSV: 1.1
VAS LEFT VERTEBRAL EDV: 16.7 CM/S
VAS LEFT VERTEBRAL PSV: 53.1 CM/S
VAS RIGHT CCA DIST EDV: 12.4 CM/S
VAS RIGHT CCA DIST PSV: 66.5 CM/S
VAS RIGHT CCA MID EDV: 16.8 CM/S
VAS RIGHT CCA MID PSV: 93.8 CM/S
VAS RIGHT CCA PROX EDV: 16.2 CM/S
VAS RIGHT CCA PROX PSV: 87 CM/S
VAS RIGHT ECA EDV: 6.9 CM/S
VAS RIGHT ECA PSV: 40.8 CM/S
VAS RIGHT ICA DIST EDV: 25.9 CM/S
VAS RIGHT ICA DIST PSV: 104 CM/S
VAS RIGHT ICA MID EDV: 29.5 CM/S
VAS RIGHT ICA MID PSV: 146 CM/S
VAS RIGHT ICA PROX EDV: 13.8 CM/S
VAS RIGHT ICA PROX PSV: 43.3 CM/S
VAS RIGHT ICA/CCA PSV: 1.6
VAS RIGHT VERTEBRAL EDV: 12.7 CM/S
VAS RIGHT VERTEBRAL PSV: 50 CM/S

## 2024-06-03 ENCOUNTER — TELEPHONE (OUTPATIENT)
Dept: CARDIOLOGY CLINIC | Age: 79
End: 2024-06-03

## 2024-06-03 NOTE — TELEPHONE ENCOUNTER
PATIENT CALLED OFFICE REQUESTING RESULTS OF CAROTID US    Interpretation Summary         Mild (<50%) stenosis in the right internal carotid artery.  Mild plaque in the right internal carotid artery.    Mild (<50%) stenosis in the left internal carotid artery.  Mild plaque in the left internal carotid artery.    Normal antegrade flow involving the right vertebral artery.    Normal antegrade flow involving the left vertebral artery.     PLEASE ADVISE

## 2024-06-04 ENCOUNTER — OFFICE VISIT (OUTPATIENT)
Dept: ORTHOPEDIC SURGERY | Age: 79
End: 2024-06-04
Payer: MEDICARE

## 2024-06-04 VITALS
HEART RATE: 72 BPM | BODY MASS INDEX: 24.84 KG/M2 | WEIGHT: 135 LBS | TEMPERATURE: 97.5 F | OXYGEN SATURATION: 97 % | HEIGHT: 62 IN

## 2024-06-04 DIAGNOSIS — R25.1 TREMOR OF RIGHT HAND: ICD-10-CM

## 2024-06-04 DIAGNOSIS — R26.89 IMBALANCE: ICD-10-CM

## 2024-06-04 DIAGNOSIS — M75.51 SUBACROMIAL BURSITIS OF RIGHT SHOULDER JOINT: ICD-10-CM

## 2024-06-04 DIAGNOSIS — M75.81 ROTATOR CUFF TENDINITIS, RIGHT: Primary | ICD-10-CM

## 2024-06-04 PROCEDURE — G8427 DOCREV CUR MEDS BY ELIG CLIN: HCPCS | Performed by: PHYSICIAN ASSISTANT

## 2024-06-04 PROCEDURE — 99214 OFFICE O/P EST MOD 30 MIN: CPT | Performed by: PHYSICIAN ASSISTANT

## 2024-06-04 PROCEDURE — 1123F ACP DISCUSS/DSCN MKR DOCD: CPT | Performed by: PHYSICIAN ASSISTANT

## 2024-06-04 PROCEDURE — 1090F PRES/ABSN URINE INCON ASSESS: CPT | Performed by: PHYSICIAN ASSISTANT

## 2024-06-04 PROCEDURE — G8399 PT W/DXA RESULTS DOCUMENT: HCPCS | Performed by: PHYSICIAN ASSISTANT

## 2024-06-04 PROCEDURE — G8420 CALC BMI NORM PARAMETERS: HCPCS | Performed by: PHYSICIAN ASSISTANT

## 2024-06-04 PROCEDURE — 1036F TOBACCO NON-USER: CPT | Performed by: PHYSICIAN ASSISTANT

## 2024-06-04 ASSESSMENT — ENCOUNTER SYMPTOMS
EYES NEGATIVE: 1
GASTROINTESTINAL NEGATIVE: 1
RESPIRATORY NEGATIVE: 1

## 2024-06-04 NOTE — PROGRESS NOTES
Malka Jones (:  1945) is a 79 y.o. female,Established patient, here for evaluation of the following chief complaint(s):  Follow-up (Follow up of right shoulder tendonitis. Pain is currently 1/10)      Assessment & Plan   1. Rotator cuff tendinitis, right  2. Subacromial bursitis of right shoulder joint      No follow-ups on file.       Subjective   This an 79-year-old right-hand-dominant female following up for right shoulder pain.  States right shoulder is doing much better after cortisone injection at her last visit.  She is complaining of a tremor that she has.  She is not happy with neurology at the [facility].  She is also complaining of balance issues.        Review of Systems   Constitutional: Negative.    HENT: Negative.     Eyes: Negative.    Respiratory: Negative.     Gastrointestinal: Negative.    Genitourinary: Negative.    Musculoskeletal: Negative.    Psychiatric/Behavioral: Negative.            Objective   Physical Exam  Constitutional:       Appearance: Normal appearance.   HENT:      Head: Normocephalic and atraumatic.      Mouth/Throat:      Mouth: Mucous membranes are moist.   Eyes:      Extraocular Movements: Extraocular movements intact.   Musculoskeletal:      Cervical back: Normal range of motion.      Comments: Right shoulder-no acromioclavicular, clavicle, SC joint tenderness with palpation.  Abduction and adduction strength is symmetrical.  Internal rotation is 0 degrees, external rotation 115 degrees about 10 degrees less than the left.  Supraspinatus test elicits very mild pain but no weakness.  Apprehension sign is negative.  Liftoff is negative.  O'Briens is negative.  Biceps contour is intact.  Sensation is intact distally to light touch   Skin:     General: Skin is warm and dry.   Neurological:      General: No focal deficit present.      Mental Status: She is oriented to person, place, and time.   Psychiatric:         Mood and Affect: Mood normal.     Patient's

## 2024-06-20 ENCOUNTER — HOSPITAL ENCOUNTER (OUTPATIENT)
Dept: PHYSICAL THERAPY | Age: 79
Setting detail: THERAPIES SERIES
Discharge: HOME | End: 2024-06-20

## 2024-08-13 ENCOUNTER — OFFICE VISIT (OUTPATIENT)
Dept: ORTHOPEDIC SURGERY | Age: 79
End: 2024-08-13
Payer: MEDICARE

## 2024-08-13 VITALS
HEIGHT: 64 IN | WEIGHT: 140 LBS | TEMPERATURE: 98.5 F | OXYGEN SATURATION: 95 % | HEART RATE: 82 BPM | BODY MASS INDEX: 23.9 KG/M2

## 2024-08-13 DIAGNOSIS — M75.81 ROTATOR CUFF TENDINITIS, RIGHT: Primary | ICD-10-CM

## 2024-08-13 PROCEDURE — 1123F ACP DISCUSS/DSCN MKR DOCD: CPT | Performed by: PHYSICIAN ASSISTANT

## 2024-08-13 PROCEDURE — G8427 DOCREV CUR MEDS BY ELIG CLIN: HCPCS | Performed by: PHYSICIAN ASSISTANT

## 2024-08-13 PROCEDURE — 20610 DRAIN/INJ JOINT/BURSA W/O US: CPT | Performed by: PHYSICIAN ASSISTANT

## 2024-08-13 PROCEDURE — 1090F PRES/ABSN URINE INCON ASSESS: CPT | Performed by: PHYSICIAN ASSISTANT

## 2024-08-13 PROCEDURE — G8399 PT W/DXA RESULTS DOCUMENT: HCPCS | Performed by: PHYSICIAN ASSISTANT

## 2024-08-13 PROCEDURE — G8420 CALC BMI NORM PARAMETERS: HCPCS | Performed by: PHYSICIAN ASSISTANT

## 2024-08-13 PROCEDURE — 99214 OFFICE O/P EST MOD 30 MIN: CPT | Performed by: PHYSICIAN ASSISTANT

## 2024-08-13 PROCEDURE — 1036F TOBACCO NON-USER: CPT | Performed by: PHYSICIAN ASSISTANT

## 2024-08-13 RX ORDER — TRIAMCINOLONE ACETONIDE 40 MG/ML
80 INJECTION, SUSPENSION INTRA-ARTICULAR; INTRAMUSCULAR ONCE
Status: COMPLETED | OUTPATIENT
Start: 2024-08-13 | End: 2024-08-13

## 2024-08-13 RX ORDER — LIDOCAINE HYDROCHLORIDE 10 MG/ML
8 INJECTION, SOLUTION INFILTRATION; PERINEURAL ONCE
Status: COMPLETED | OUTPATIENT
Start: 2024-08-13 | End: 2024-08-13

## 2024-08-13 RX ADMIN — TRIAMCINOLONE ACETONIDE 80 MG: 40 INJECTION, SUSPENSION INTRA-ARTICULAR; INTRAMUSCULAR at 10:39

## 2024-08-13 RX ADMIN — LIDOCAINE HYDROCHLORIDE 8 ML: 10 INJECTION, SOLUTION INFILTRATION; PERINEURAL at 10:40

## 2024-08-13 ASSESSMENT — ENCOUNTER SYMPTOMS
RESPIRATORY NEGATIVE: 1
GASTROINTESTINAL NEGATIVE: 1
EYES NEGATIVE: 1

## 2024-08-13 NOTE — PROGRESS NOTES
Malka Jones (:  1945) is a 79 y.o. female,Established patient, here for evaluation of the following chief complaint(s):  Follow-up (Right shoulder tendonitis. Pain is currently 8/10)         Assessment & Plan  Rotator cuff tendinitis, right   Uncontrolled, continue current medications and cortisone injection shoulder today    Orders:    DRAIN/INJECT LARGE JOINT/BURSA    lidocaine 1 % injection 8 mL    triamcinolone acetonide (KENALOG-40) injection 80 mg    Ambulatory referral to Neurology      No follow-ups on file.       Subjective   This is an 79-year-old right-hand-dominant female following up for right shoulder pain.  She has known rotator cuff tendinitis of the right shoulder.  I have injected her shoulder previously with cortisone.  She presents today with complaint of right shoulder pain.  She is requesting cortisone injection.  She is also requesting a different neurologist for her Parkinson's.        Review of Systems   Constitutional: Negative.    HENT: Negative.     Eyes: Negative.    Respiratory: Negative.     Gastrointestinal: Negative.    Genitourinary: Negative.    Musculoskeletal: Negative.    Psychiatric/Behavioral: Negative.            Objective   Physical Exam  Constitutional:       Appearance: Normal appearance.   HENT:      Head: Normocephalic and atraumatic.      Mouth/Throat:      Mouth: Mucous membranes are moist.   Eyes:      Extraocular Movements: Extraocular movements intact.   Musculoskeletal:      Cervical back: Normal range of motion.      Comments: Right shoulder-no acromioclavicular, clavicle, SC joint tenderness with palpation.  Abduction and adduction strength is symmetrical.  Internal rotation is 0 degrees, external rotation 115 degrees about 10 degrees less than the left.  Supraspinatus test elicits very mild pain but no weakness.  Apprehension sign is negative.  Liftoff is negative.  O'Briens is negative.  Biceps contour is intact.  Sensation is intact distally

## 2024-11-15 ENCOUNTER — OFFICE VISIT (OUTPATIENT)
Dept: CARDIOLOGY CLINIC | Age: 79
End: 2024-11-15

## 2024-11-15 VITALS
OXYGEN SATURATION: 97 % | SYSTOLIC BLOOD PRESSURE: 120 MMHG | WEIGHT: 146 LBS | BODY MASS INDEX: 25.06 KG/M2 | HEART RATE: 107 BPM | DIASTOLIC BLOOD PRESSURE: 72 MMHG

## 2024-11-15 DIAGNOSIS — I77.3 ARTERIAL FIBROMUSCULAR DYSPLASIA (HCC): Primary | ICD-10-CM

## 2024-11-15 DIAGNOSIS — E78.5 DYSLIPIDEMIA: ICD-10-CM

## 2024-11-15 RX ORDER — PRAMIPEXOLE DIHYDROCHLORIDE 0.12 MG/1
0.12 TABLET ORAL 3 TIMES DAILY
COMMUNITY

## 2024-11-15 RX ORDER — ATORVASTATIN CALCIUM 20 MG/1
20 TABLET, FILM COATED ORAL DAILY
Qty: 90 TABLET | Refills: 3 | Status: SHIPPED | OUTPATIENT
Start: 2024-11-15

## 2024-11-15 ASSESSMENT — ENCOUNTER SYMPTOMS
CHEST TIGHTNESS: 0
COUGH: 0
NAUSEA: 0
STRIDOR: 0
BLOOD IN STOOL: 0
GASTROINTESTINAL NEGATIVE: 1
WHEEZING: 0
EYES NEGATIVE: 1
SHORTNESS OF BREATH: 1

## 2024-11-15 NOTE — PROGRESS NOTES
Subsequent Progress Note      Patient: Malka Jones  YOB: 1945  MRN: [id number]    Chief Complaint: CP BEATRICE HTN VAZQUEZ dizzy  Chief Complaint   Patient presents with    Hyperlipidemia    6 Month Follow-Up        CV Data:  2010 Mesenteric Thrombus - treated with Warfarin.   1/2020 spect negative.   1/2020 echo ef 60  1/2020 CUS mild   2/21CUS mild   12/21 spect Inferior apical ischemia  12/21 Echo EF 65%  1/22 Cath Normal CORS. EDP 4 LVEF 55-60  PD  Right Ear Metal in place- can not have MRI    Subjective/HPI: recent in hosp for CP. No ACS and released.  She still has CP sometimes related to eating other times not. She has signficant VAZQUEZ with walking. Getting worse.  Has BEATRICE but not using CPAP as it leaks a lot.     1/30/2020 fell this past Saturday while picking up dog waste. No major injuries.  She walked in here today.   She has been dizzy lately.     9/2/2020 still dizzy but no further falls no bleed. Takes meds.    stopped asa.. she has developed Dysphagia and choking with solids and liquids.     1/4/21 no cp no sob no falls no bleed no falls decreased appetite.   Life  Long nonsmoker    5/4/21 no cp no sob no falls no bleed. Had bladde simulator for incontinence but not effective    10/29/21 now having burning cp and diaphoresis no radiation. Still has vazquez. No bleed no falls takes meds.    12/9/21 still with burning CP and SOB. Subsides with rest not dizzy. no bleed.     2/14/22 had Esophagus dilted. Feels better.  occ cp related to food. Cath negative    8/17/22 doing well no cp no sob no falls no bleed not very active.  Still has dyphagia.     6/19/23 fell due to balance no severe injury now using a meet. She has arthritis of neck but can;t get MRI due to Right ear cochlear implant.  No cp no sob     12/18/23 balance is off. No cp no sob no falls no bleed weak. She has right thumb pain and has a left Baker's cyst    5/2124 not sleepiong well due to chronic bladder infection. Has been on AB+X for

## 2024-12-19 ENCOUNTER — OFFICE VISIT (OUTPATIENT)
Dept: ORTHOPEDIC SURGERY | Age: 79
End: 2024-12-19

## 2024-12-19 VITALS
WEIGHT: 139 LBS | TEMPERATURE: 97 F | OXYGEN SATURATION: 96 % | HEIGHT: 64 IN | DIASTOLIC BLOOD PRESSURE: 78 MMHG | BODY MASS INDEX: 23.73 KG/M2 | HEART RATE: 84 BPM | SYSTOLIC BLOOD PRESSURE: 124 MMHG

## 2024-12-19 DIAGNOSIS — M75.51 SUBACROMIAL BURSITIS OF RIGHT SHOULDER JOINT: ICD-10-CM

## 2024-12-19 DIAGNOSIS — M75.81 ROTATOR CUFF TENDINITIS, RIGHT: Primary | ICD-10-CM

## 2024-12-19 DIAGNOSIS — M50.30 DDD (DEGENERATIVE DISC DISEASE), CERVICAL: ICD-10-CM

## 2024-12-19 RX ORDER — TRIAMCINOLONE ACETONIDE 40 MG/ML
80 INJECTION, SUSPENSION INTRA-ARTICULAR; INTRAMUSCULAR ONCE
Status: COMPLETED | OUTPATIENT
Start: 2024-12-19 | End: 2024-12-19

## 2024-12-19 RX ORDER — LIDOCAINE HYDROCHLORIDE 10 MG/ML
8 INJECTION, SOLUTION INFILTRATION; PERINEURAL ONCE
Status: COMPLETED | OUTPATIENT
Start: 2024-12-19 | End: 2024-12-19

## 2024-12-19 RX ADMIN — LIDOCAINE HYDROCHLORIDE 8 ML: 10 INJECTION, SOLUTION INFILTRATION; PERINEURAL at 12:14

## 2024-12-19 RX ADMIN — TRIAMCINOLONE ACETONIDE 80 MG: 40 INJECTION, SUSPENSION INTRA-ARTICULAR; INTRAMUSCULAR at 12:14

## 2024-12-19 ASSESSMENT — ENCOUNTER SYMPTOMS
EYES NEGATIVE: 1
GASTROINTESTINAL NEGATIVE: 1
RESPIRATORY NEGATIVE: 1

## 2024-12-19 NOTE — PROGRESS NOTES
with palpation.  Abduction and adduction strength is symmetrical.  Internal rotation is 0 degrees, external rotation 115 degrees about 10 degrees less than the left.  Supraspinatus test elicits very mild pain but no weakness.  Apprehension sign is negative.  Liftoff is negative.  O'Briens is negative.  Biceps contour is intact.  Sensation is intact distally to light touch   Skin:     General: Skin is warm and dry.   Neurological:      General: No focal deficit present.      Mental Status: She is oriented to person, place, and time.   Psychiatric:         Mood and Affect: Mood normal.       Physical Exam    Injected the patient's right shoulder with cortisone.  She would like to be seen by pain management for her neck.  I placed the request.  She has been seen previously by the [facility] but is wanting to transfer everything to [facility].       On this date 12/19/2024 I have spent 35 minutes reviewing previous notes, test results and face to face with the patient discussing the diagnosis and importance of compliance with the treatment plan as well as documenting on the day of the visit.  Duration time required for the cortisone injection of the right shoulder is not included in the aforementioned 35 minutes.    The patient (or guardian, if applicable) and other individuals in attendance with the patient were advised that Artificial Intelligence will be utilized during this visit to record, process the conversation to generate a clinical note, and support improvement of the AI technology. The patient (or guardian, if applicable) and other individuals in attendance at the appointment consented to the use of AI, including the recording.      An electronic signature was used to authenticate this note.    --YOMI King

## 2025-02-11 ENCOUNTER — INITIAL CONSULT (OUTPATIENT)
Age: 80
End: 2025-02-11
Payer: MEDICARE

## 2025-02-11 VITALS
WEIGHT: 135 LBS | SYSTOLIC BLOOD PRESSURE: 140 MMHG | HEIGHT: 62 IN | BODY MASS INDEX: 24.84 KG/M2 | TEMPERATURE: 97.9 F | DIASTOLIC BLOOD PRESSURE: 80 MMHG

## 2025-02-11 DIAGNOSIS — M43.6 NECK STIFFNESS: ICD-10-CM

## 2025-02-11 DIAGNOSIS — M47.812 CERVICAL SPONDYLOSIS: Primary | ICD-10-CM

## 2025-02-11 DIAGNOSIS — M79.18 MYOFASCIAL PAIN: ICD-10-CM

## 2025-02-11 DIAGNOSIS — G20.A1 PARKINSON'S DISEASE, UNSPECIFIED WHETHER DYSKINESIA PRESENT, UNSPECIFIED WHETHER MANIFESTATIONS FLUCTUATE (HCC): ICD-10-CM

## 2025-02-11 PROCEDURE — G8420 CALC BMI NORM PARAMETERS: HCPCS | Performed by: PAIN MEDICINE

## 2025-02-11 PROCEDURE — 1159F MED LIST DOCD IN RCRD: CPT | Performed by: PAIN MEDICINE

## 2025-02-11 PROCEDURE — 1123F ACP DISCUSS/DSCN MKR DOCD: CPT | Performed by: PAIN MEDICINE

## 2025-02-11 PROCEDURE — 3079F DIAST BP 80-89 MM HG: CPT | Performed by: PAIN MEDICINE

## 2025-02-11 PROCEDURE — 1090F PRES/ABSN URINE INCON ASSESS: CPT | Performed by: PAIN MEDICINE

## 2025-02-11 PROCEDURE — 3077F SYST BP >= 140 MM HG: CPT | Performed by: PAIN MEDICINE

## 2025-02-11 PROCEDURE — G8427 DOCREV CUR MEDS BY ELIG CLIN: HCPCS | Performed by: PAIN MEDICINE

## 2025-02-11 PROCEDURE — 1036F TOBACCO NON-USER: CPT | Performed by: PAIN MEDICINE

## 2025-02-11 PROCEDURE — 99214 OFFICE O/P EST MOD 30 MIN: CPT | Performed by: PAIN MEDICINE

## 2025-02-11 PROCEDURE — G8399 PT W/DXA RESULTS DOCUMENT: HCPCS | Performed by: PAIN MEDICINE

## 2025-02-11 PROCEDURE — 1125F AMNT PAIN NOTED PAIN PRSNT: CPT | Performed by: PAIN MEDICINE

## 2025-02-11 PROCEDURE — 99204 OFFICE O/P NEW MOD 45 MIN: CPT | Performed by: PAIN MEDICINE

## 2025-02-11 ASSESSMENT — ENCOUNTER SYMPTOMS
NAUSEA: 0
CONSTIPATION: 0
DIARRHEA: 0
SHORTNESS OF BREATH: 0

## 2025-02-11 NOTE — PROGRESS NOTES
[facility]  [facility]  [address], [address]  [place], [place]  P: [phone]  F: [phone]        Malka Jones  (1945)    2/11/2025    Subjective:     Malka Jones is 79 y.o. female who complains today of:    Chief Complaint   Patient presents with    Neck Pain    Back Pain       HPI    Patient recently diagnosed with Parkinson's Disease who presents today complaining of neck pain.     Pain is located in the neck.  Pain is axial, but is referred to the shoulders bilaterally.   Pain is chronic  Pain is described as throbbing, aching, burning, and sharp.  Pain level today is rated 8 on a 10 on a NRS scale.  It is severe in nature.  Pain is affecting the patients ability to perform activities of daily living especially with regards to personal hygiene, household chores and self care.  With pain medication, the patient is better able to perform ADLs.  Pain in part is secondary to osteoarthritis of the spine.  The patient is tolerating her pain medication (Tylenol, but does not help much) regimen without any adverse effects.  Pain is aggravated by  turning head., bending, lifting, twisting, walking, standing, and over head activity  Pain is not associated with fevers/chills/night sweats.  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   Balance is poor. She uses a cane.   No new paraesthesias noted.    She has not done PT for this.       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

## 2025-02-17 ENCOUNTER — HOSPITAL ENCOUNTER (OUTPATIENT)
Dept: PHYSICAL THERAPY | Age: 80
Setting detail: THERAPIES SERIES
Discharge: HOME | End: 2025-02-17
Attending: PAIN MEDICINE
Payer: MEDICARE

## 2025-02-17 PROCEDURE — 97162 PT EVAL MOD COMPLEX 30 MIN: CPT

## 2025-02-19 ENCOUNTER — HOSPITAL ENCOUNTER (OUTPATIENT)
Dept: PHYSICAL THERAPY | Age: 80
Setting detail: THERAPIES SERIES
Discharge: HOME | End: 2025-02-19
Attending: PAIN MEDICINE
Payer: MEDICARE

## 2025-02-19 PROCEDURE — 97110 THERAPEUTIC EXERCISES: CPT

## 2025-02-19 PROCEDURE — 97112 NEUROMUSCULAR REEDUCATION: CPT

## 2025-02-19 ASSESSMENT — PAIN DESCRIPTION - DESCRIPTORS: DESCRIPTORS: SORE

## 2025-02-19 ASSESSMENT — PAIN SCALES - GENERAL: PAINLEVEL_OUTOF10: 7

## 2025-02-19 ASSESSMENT — PAIN DESCRIPTION - LOCATION: LOCATION: NECK

## 2025-02-19 NOTE — PROGRESS NOTES
[facility]  Outpatient Physical Therapy    Treatment Note        Date: 2025  Patient: Malka Jones  : 1945   Confirmed: Yes  MRN: [id number]  Referring Provider: Malia Ibarra MD    Medical Diagnosis: Cervical spondylosis [M47.812]  Neck stiffness [M43.6]  Parkinson's disease, unspecified whether dyskinesia present, unspecified whether manifestations fluctuate (HCC) [G20.A1]  Myofascial pain [M79.18]       Treatment Diagnosis: neck pain, impaired balance, impaired strength    Visit Information:  Insurance: Payor: HU[employer]NA MEDICARE / Plan: HUMANA CHOICE-PPO MEDICARE / Product Type: *No Product type* /   PT Visit Information  PT Insurance Information: [employer] Medicare  Total # of Visits to Date: 2  Plan of Care/Certification Expiration Date: 25  No Show: 0  Progress Note Due Date: 25  Canceled Appointment: 0  Progress Note Counter:  (10v 25 to 25)    Subjective Information:  Subjective: Patient reports cervical soreness this AM. \"I don't realize how bad off I am until I come here\"  HEP Compliance: Initiated today.                Pain Screening  Patient Currently in Pain: Yes  Pain Assessment: 0-10  Pain Level: 7  Pain Location: Neck  Pain Descriptors: Sore    Treatment:  Exercises:  Exercises  Exercise 1: Scap retractions 3'' x10  Exercise 2: cervical AROM 3'' x10 ea plane  Exercise 6: UBE UE/LE: 2.5' forward/2.5' retro for 5min total (Seat at level 5)  Exercise 8: 2 way SLR: SLR x8 on Lt, x10 on Rt; hip abd x5 on Lt, x6 on Rt  Exercise 9: Bridges 3'' x10  Exercise 10: Clamshells 3'' x10  Exercise 13: Gait: forward with focus on arm swing, foot clearance and heel strike, lateral, marching 0-2 UE support  Exercise 15: LTR 3'' x10  Exercise 16: PBall DKTC 3'' x10  Exercise 20: HEP: LTR, bridge, SLR, clams, cervical AROM            *Indicates exercise, modality, or manual techniques to be initiated when appropriate        Assessment:   Body Structures, Functions,

## 2025-02-27 ENCOUNTER — HOSPITAL ENCOUNTER (OUTPATIENT)
Dept: PHYSICAL THERAPY | Age: 80
Setting detail: THERAPIES SERIES
Discharge: HOME | End: 2025-02-27
Attending: PAIN MEDICINE
Payer: MEDICARE

## 2025-02-27 NOTE — PROGRESS NOTES
Therapy                            Cancellation/No-show Note    Date: 2025  Patient: Malka Jones (79 y.o. female)  : 1945  MRN:  [id number]  Referring Physician: Malia Ibarra MD    Medical Diagnosis: Cervical spondylosis [M47.812]  Neck stiffness [M43.6]  Parkinson's disease, unspecified whether dyskinesia present, unspecified whether manifestations fluctuate (HCC) [G20.A1]  Myofascial pain [M79.18]      Visit Information:  Insurance: Payor: HUMANA MEDICARE / Plan: HUMANA CHOICE-PPO MEDICARE / Product Type: *No Product type* /   Visits to Date: 2   No Show/Cancelled Appts:       For today's appointment patient:  [x]  Cancelled  []  Rescheduled appointment  []  No-show   []  Called pt to remind of next appointment     Reason given by patient:  [x]  Patient ill  []  Conflicting appointment  []  No transportation    []  Conflict with work  []  No reason given  []  Other:      [x] Pt has future appointments scheduled, no follow up needed  [] Pt requests to be on hold.    Reason:   If > 2 weeks please discuss with therapist.  [] Therapist to call pt for follow up  [] [place] to call pt to reschedule   Comments:       Signature: Electronically signed by Jerri House PT on 25 at 8:35 AM EST

## 2025-03-03 NOTE — PROGRESS NOTES
Therapy                            Cancellation/No-show Note    Date: 2025  Patient: Malka Jones (79 y.o. female)  : 1945  MRN:  [id number]  Referring Physician: Malia Ibarra MD    Medical Diagnosis: Cervical spondylosis [M47.812]  Neck stiffness [M43.6]  Parkinson's disease, unspecified whether dyskinesia present, unspecified whether manifestations fluctuate (HCC) [G20.A1]  Myofascial pain [M79.18]      Visit Information:  Insurance: Payor: HUMANA MEDICARE / Plan: HUMANA CHOICE-PPO MEDICARE / Product Type: *No Product type* /   Visits to Date: 2   No Show/Cancelled Appts: 1 / 3      For today's appointment patient:  [x]  Cancelled  []  Rescheduled appointment  []  No-show   []  Called pt to remind of next appointment     Reason given by patient:  []  Patient ill  []  Conflicting appointment  []  No transportation    []  Conflict with work  [x]  No reason given  []  Other:      [x] Pt has future appointments scheduled, no follow up needed  [] Pt requests to be on hold.    Reason:   If > 2 weeks please discuss with therapist.  [] Therapist to call pt for follow up  [] [place] to call pt to reschedule   Comments:       Signature: Electronically signed by Amy Nelson PTA on 3/3/25 at 3:20 PM EST

## 2025-03-04 ENCOUNTER — HOSPITAL ENCOUNTER (OUTPATIENT)
Dept: PHYSICAL THERAPY | Age: 80
Setting detail: THERAPIES SERIES
Discharge: HOME | End: 2025-03-04
Attending: PAIN MEDICINE

## 2025-03-07 NOTE — PROGRESS NOTES
Therapy                            Cancellation/No-show Note    Date: 2025  Patient: Malka Jones (79 y.o. female)  : 1945  MRN:  [id number]  Referring Physician: Malia Ibarra MD    Medical Diagnosis: Cervical spondylosis [M47.812]  Neck stiffness [M43.6]  Parkinson's disease, unspecified whether dyskinesia present, unspecified whether manifestations fluctuate (HCC) [G20.A1]  Myofascial pain [M79.18]      Visit Information:  Insurance: Payor: HUMANA MEDICARE / Plan: HUMANA CHOICE-PPO MEDICARE / Product Type: *No Product type* /   Visits to Date: 2   No Show/Cancelled Appts:       For today's appointment patient:  [x]  Cancelled  []  Rescheduled appointment  []  No-show   []  Called pt to remind of next appointment     Reason given by patient:  []  Patient ill  []  Conflicting appointment  []  No transportation    []  Conflict with work  []  No reason given  [x]  Other:  Pt injured her hand, request to be on hold.  Will follow up in 3 weeks.    [] Pt has future appointments scheduled, no follow up needed  [] Pt requests to be on hold.    Reason:   If > 2 weeks please discuss with therapist.  [] Therapist to call pt for follow up  []  to call pt to reschedule   Comments:       Signature: Electronically signed by Shy Barrera PT on 3/7/25 at 3:53 PM EST

## 2025-03-11 ENCOUNTER — HOSPITAL ENCOUNTER (OUTPATIENT)
Dept: PHYSICAL THERAPY | Age: 80
Setting detail: THERAPIES SERIES
Discharge: HOME | End: 2025-03-11
Attending: PAIN MEDICINE

## 2025-04-08 ENCOUNTER — HOSPITAL ENCOUNTER (OUTPATIENT)
Dept: PHYSICAL THERAPY | Age: 80
Setting detail: THERAPIES SERIES
Discharge: HOME | End: 2025-04-08
Attending: PAIN MEDICINE
Payer: MEDICARE

## 2025-04-08 PROCEDURE — 97112 NEUROMUSCULAR REEDUCATION: CPT

## 2025-04-08 PROCEDURE — 97110 THERAPEUTIC EXERCISES: CPT

## 2025-04-08 ASSESSMENT — PAIN SCALES - GENERAL: PAINLEVEL_OUTOF10: 5

## 2025-04-08 ASSESSMENT — PAIN DESCRIPTION - DESCRIPTORS: DESCRIPTORS: SORE

## 2025-04-08 ASSESSMENT — PAIN DESCRIPTION - LOCATION: LOCATION: NECK;HAND

## 2025-04-08 NOTE — PROGRESS NOTES
[facility]  Outpatient Physical Therapy   Treatment Note        Date: 2025  Patient: Malka Jones  : 1945   Confirmed: Yes  MRN: [id number]  Referring Provider: Malia Ibarra MD      Medical Diagnosis: Cervical spondylosis [M47.812]  Neck stiffness [M43.6]  Parkinson's disease, unspecified whether dyskinesia present, unspecified whether manifestations fluctuate (HCC) [G20.A1]  Myofascial pain [M79.18]      Treatment Diagnosis: neck pain, impaired balance, impaired strength    Visit Information:  Insurance: Payor: HUMANA MEDICARE / Plan: HUMANA CHOICE-PPO MEDICARE / Product Type: *No Product type* /   PT Visit Information  PT Insurance Information: [employer] Medicare  Total # of Visits to Date: 3  Plan of Care/Certification Expiration Date: 25  No Show: 1  Progress Note Due Date: 25  Canceled Appointment: 4  Progress Note Counter:  (10v 25 to 25)    Subjective Information:  Subjective: Patient reports she injury to left hand 4 weeks ago.  Patient is following up next Thursday about the hand.  HEP Compliance:  [x] Good [] Fair [] Poor [] Reports not doing due to:             Pain Screening  Patient Currently in Pain: Yes  Pain Assessment: 0-10  Pain Level: 5  Pain Location: Neck, Hand  Pain Descriptors: Sore    Treatment:  Exercises:  Exercises  Exercise 1: Scap retractions 3'' x10  Exercise 2: cervical AROM 3'' x10 ea plane  Exercise 7: H/L hip adduction 5 sec hold x 10, seated hip abduction RTB 5 sec hold x 10  Exercise 8: SLR x 10  Exercise 9: Bridges 3'' x12  Exercise 12: Duran balance: FA, FT, EO, EC, semi tandem,  Exercise 13: gait drills: lateral, marching, retro x 1 lap  Exercise 15: LTR 3'' x10  Exercise 16: PBall DKTC 3'' x10  Exercise 20: HEP: LTR, bridge, cervical AROM, SLR, hip adduction, hip abduction RTB, lateral walking along counter     *Indicates exercise, modality, or manual techniques to be initiated when appropriate        Assessment:   Body

## 2025-04-11 ENCOUNTER — HOSPITAL ENCOUNTER (OUTPATIENT)
Dept: PHYSICAL THERAPY | Age: 80
Setting detail: THERAPIES SERIES
Discharge: HOME | End: 2025-04-11
Attending: PAIN MEDICINE
Payer: MEDICARE

## 2025-04-11 PROCEDURE — 97110 THERAPEUTIC EXERCISES: CPT

## 2025-04-11 PROCEDURE — 97112 NEUROMUSCULAR REEDUCATION: CPT

## 2025-04-11 ASSESSMENT — PAIN SCALES - GENERAL: PAINLEVEL_OUTOF10: 3

## 2025-04-11 NOTE — PROGRESS NOTES
[facility]  PHYSICAL THERAPY PLAN OF CARE                                    [facility] [place] [address]     Ph: [phone]  Fax: [phone]      [] Certification  [] Recertification [x]  Plan of Care  [] Progress Note [] Discharge      Referring Provider: Malia Ibarra MD     From:  Taryn Liriano PTA  Patient: Malka Jones (80 y.o. female) : 1945 Date: 2025  Medical Diagnosis: Cervical spondylosis [M47.812]  Neck stiffness [M43.6]  Parkinson's disease, unspecified whether dyskinesia present, unspecified whether manifestations fluctuate (HCC) [G20.A1]  Myofascial pain [M79.18]       Treatment Diagnosis: neck pain, impaired balance, impaired strength    Plan of Care/Certification Expiration Date: : 25   Progress Report Period from:  2025  to 2025    Visits to Date: 4 No Show: 1 Cancelled Appts: 4    OBJECTIVE:   Short Term Goals - Time Frame for Short Term Goals: 4 weeks    Goals Current/Discharge status  Status   Short Term Goal 1: Patient will report </= 3/10 pain in cervical region with turning her head for driving.  9/10 with turning head side to side, 0/10 at rest In progress   Short Term Goal 2: Patient will demonstrate improved upright posture without verbal cues.  Continued cues needed ginny upon initially standing up, patient is kyphotic and ff at hips In progress   Short Term Goal 3: Patient will be independent with HEP.  Needs progression In progress   Long Term Goals - Time Frame for Long Term Goals : 6 weeks  Goals Current/ Discharge status Status   Long Term Goal 1: Patient will increase cervical AROM >/= 5-10 degrees for improved tolerance with looking with driving. rt rtn 23 deg, left 25 deg, flexion 10deg, ext 15 deg, side bend 5 deg antonina  In progress   Long Term Goal 2: Patient will increase strength in bilateral UEs and LEs >/= 4+/5 for improved tolerance with ADLs. Strength RLE  R Hip Flexion: 4-/5  R Hip ABduction: 4/5  R Knee Flexion: 4/5  R 
Endurance training, Gait training, Stair training, Neuromuscular re-education, Home exercise program, Safety education & training, Patient/Caregiver education & training, Equipment evaluation, education, & procurement, Modalities  Modalities: Heat/Cold  Pt to continue current HEP.  See objective section for any therapeutic exercise changes, additions or modifications this date.    Therapy Time:      PT Individual Minutes  Time In: 0905  Time Out: 1005  Minutes: 60  Timed Code Treatment Minutes: 60 Minutes  Procedure Minutes:0  Timed Activity Minutes Units   Ther Ex 35 2   Neuro José Miguel 25 2     Electronically signed by Taryn Liriano PTA on 4/11/25 at 11:51 AM EDT

## 2025-04-21 ENCOUNTER — OFFICE VISIT (OUTPATIENT)
Age: 80
End: 2025-04-21

## 2025-04-21 VITALS
HEART RATE: 90 BPM | HEIGHT: 63 IN | TEMPERATURE: 96.9 F | BODY MASS INDEX: 23.39 KG/M2 | OXYGEN SATURATION: 97 % | WEIGHT: 132 LBS

## 2025-04-21 DIAGNOSIS — M75.81 ROTATOR CUFF TENDINITIS, RIGHT: Primary | ICD-10-CM

## 2025-04-21 DIAGNOSIS — M75.51 SUBACROMIAL BURSITIS OF RIGHT SHOULDER JOINT: ICD-10-CM

## 2025-04-21 RX ORDER — LIDOCAINE HYDROCHLORIDE 10 MG/ML
8 INJECTION, SOLUTION INFILTRATION; PERINEURAL ONCE
Status: COMPLETED | OUTPATIENT
Start: 2025-04-21 | End: 2025-04-21

## 2025-04-21 RX ORDER — TRIAMCINOLONE ACETONIDE 40 MG/ML
80 INJECTION, SUSPENSION INTRA-ARTICULAR; INTRAMUSCULAR ONCE
Status: COMPLETED | OUTPATIENT
Start: 2025-04-21 | End: 2025-04-21

## 2025-04-21 RX ADMIN — TRIAMCINOLONE ACETONIDE 80 MG: 40 INJECTION, SUSPENSION INTRA-ARTICULAR; INTRAMUSCULAR at 15:31

## 2025-04-21 RX ADMIN — LIDOCAINE HYDROCHLORIDE 8 ML: 10 INJECTION, SOLUTION INFILTRATION; PERINEURAL at 15:31

## 2025-04-21 ASSESSMENT — ENCOUNTER SYMPTOMS
GASTROINTESTINAL NEGATIVE: 1
EYES NEGATIVE: 1
RESPIRATORY NEGATIVE: 1

## 2025-04-21 NOTE — PROGRESS NOTES
Malka Jones (:  1945) is a 80 y.o. female,Established patient, here for evaluation of the following chief complaint(s):  Follow-up (Right shoulder pain)         Assessment & Plan  Rotator cuff tendinitis, right   Chronic, not at goal (unstable), changes made today: Cortisone injection today    Orders:    DRAIN/INJECT LARGE JOINT/BURSA    lidocaine 1 % injection 8 mL    triamcinolone acetonide (KENALOG-40) injection 80 mg    Subacromial bursitis of right shoulder joint   Chronic, not at goal (unstable), changes made today: Cortisone injection today    Orders:    DRAIN/INJECT LARGE JOINT/BURSA    lidocaine 1 % injection 8 mL    triamcinolone acetonide (KENALOG-40) injection 80 mg      Assessment & Plan  1. Right shoulder pain: Likely due to fall during nighttime bathroom visit. Advised consistent walker use, especially at night. Administered injection for pain relief. Follow-up in 2 weeks.    PROCEDURE  Performed joint injection on right shoulder.        No follow-ups on file.       Subjective   History of Present Illness  80-year-old female presents with right shoulder pain. Discomfort attributed to a recent fall during nighttime bathroom visit. Reports general lack of energy. Does not wear brace while sleeping due to irritation. Older sister [place] her for [place].    Right Shoulder Pain  Discomfort attributed to a recent fall during nighttime bathroom visit.  - Onset: Recent fall during nighttime bathroom visit.  - Location: Right shoulder.  - Character: Discomfort.  - Alleviating/Aggravating Factors: Does not wear brace while sleeping due to irritation.    General Lack of Energy  Reports general lack of energy.    SOCIAL HISTORY  - Occupation: [employer] employee         Review of Systems   Constitutional: Negative.    HENT: Negative.     Eyes: Negative.    Respiratory: Negative.     Gastrointestinal: Negative.    Genitourinary: Negative.    Musculoskeletal: Negative.    Psychiatric/Behavioral:

## 2025-04-22 ENCOUNTER — HOSPITAL ENCOUNTER (OUTPATIENT)
Dept: PHYSICAL THERAPY | Age: 80
Setting detail: THERAPIES SERIES
Discharge: HOME | End: 2025-04-22
Attending: PAIN MEDICINE
Payer: MEDICARE

## 2025-04-22 PROCEDURE — 97112 NEUROMUSCULAR REEDUCATION: CPT

## 2025-04-22 PROCEDURE — 97110 THERAPEUTIC EXERCISES: CPT

## 2025-04-22 ASSESSMENT — PAIN DESCRIPTION - DESCRIPTORS: DESCRIPTORS: SORE

## 2025-04-22 ASSESSMENT — PAIN DESCRIPTION - LOCATION: LOCATION: NECK

## 2025-04-22 ASSESSMENT — PAIN SCALES - GENERAL: PAINLEVEL_OUTOF10: 8

## 2025-04-22 NOTE — PROGRESS NOTES
[facility]  Outpatient Physical Therapy   Treatment Note        Date: 2025  Patient: Malka Jones  : 1945   Confirmed: Yes  MRN: [id number]  Referring Provider: Malia Ibarra MD      Medical Diagnosis: Cervical spondylosis [M47.812]  Neck stiffness [M43.6]  Parkinson's disease, unspecified whether dyskinesia present, unspecified whether manifestations fluctuate (HCC) [G20.A1]  Myofascial pain [M79.18]      Treatment Diagnosis: neck pain, impaired balance, impaired strength    Visit Information:  Insurance: Payor: HUMANA MEDICARE / Plan: HUMANA CHOICE-PPO MEDICARE / Product Type: *No Product type* /   PT Visit Information  PT Insurance Information: [employer] Medicare  Total # of Visits to Date: 5  Plan of Care/Certification Expiration Date: 25  No Show: 1  Progress Note Due Date: 25  Canceled Appointment: 4  Progress Note Counter:  (10v 25 to 25)    Subjective Information:  Subjective: Patient reports neck exercises irritate neck during therapy.  Patient reports falling last night going to the bathroom,  got her up.  Patient reports falling on left side.  HEP Compliance:  [] Good [] Fair [] Poor [x] Reports not doing due to: painful neck exercises      Pain Screening  Patient Currently in Pain: Yes  Pain Assessment: 0-10  Pain Level: 8 (7-8/10 neck; 6/10 on left side)  Pain Location: Neck  Pain Descriptors: Sore    Treatment:  Exercises:  Exercises  Exercise 14: sink ex, bilateral UE support, 1 set x 10 reps each LE  Exercise 16: taps with colors blocks (2-3 blocks), random verbal cueing from therapist, alternating hand support on parallel bars, 5-10 minutes  Exercise 17: small hurdles in parallel bars, forward & lateral, varying UE support on bars, CGA/supervision of therapist, 3 laps each direction, 5-10 minutes  Exercise 18: balance board, bilateral UE support, lateral, 3 minutes  Exercise 19: NUSTEP, arms & legs, 6 minutes, level 1.0  Exercise 20: HEP:

## 2025-04-24 ENCOUNTER — HOSPITAL ENCOUNTER (OUTPATIENT)
Dept: PHYSICAL THERAPY | Age: 80
Setting detail: THERAPIES SERIES
Discharge: HOME | End: 2025-04-24
Attending: PAIN MEDICINE
Payer: MEDICARE

## 2025-04-24 PROCEDURE — 97110 THERAPEUTIC EXERCISES: CPT

## 2025-04-24 PROCEDURE — 97112 NEUROMUSCULAR REEDUCATION: CPT

## 2025-04-24 NOTE — PROGRESS NOTES
[facility]  Outpatient Physical Therapy   Treatment Note        Date: 2025  Patient: Malka Jones  : 1945   Confirmed: Yes  MRN: [id number]  Referring Provider: Malia Ibarra MD      Medical Diagnosis: Cervical spondylosis [M47.812]  Neck stiffness [M43.6]  Parkinson's disease, unspecified whether dyskinesia present, unspecified whether manifestations fluctuate (HCC) [G20.A1]  Myofascial pain [M79.18]      Treatment Diagnosis: neck pain, impaired balance, impaired strength    Visit Information:  Insurance: Payor: HUMANA MEDICARE / Plan: HUMANA CHOICE-PPO MEDICARE / Product Type: *No Product type* /   PT Visit Information  PT Insurance Information: [employer] Medicare  Total # of Visits to Date: 6  Plan of Care/Certification Expiration Date: 25  No Show: 1  Progress Note Due Date: 25  Canceled Appointment: 4  Progress Note Counter:  (10v 25 to 25)    Subjective Information:  Subjective: Patient reports no pain today.  Patient reports feeling good after last session.  HEP Compliance:  [x] Good [] Fair [] Poor [] Reports not doing due to:      Pain Screening  Patient Currently in Pain: Denies    Treatment:  Exercises:  Exercises  Exercise 15: step taps, 4 inch step & 6 inch step, 2 sets x 20 reps each step, varying UE support on parallel bars with CGA/MIN A required from therapist for increased safety & balance  Exercise 17: small hurdles in parallel bars, forward & lateral, varying UE support on bars, supervision of therapist, 3 laps each direction, 5-10 minutes  Exercise 18: balance board, bilateral UE support, lateral & forward, 2 minutes x 3 trials  Exercise 19: NUSTEP, arms & legs, 6 minutes, level 1.0  Exercise 20: HEP: continue with current + step taps  Treatment Reasoning  Limitations addressed: Mobility, Strength, Balance, Flexibility, Activity tolerance, Posture, Pain modulation  Therapist provided: Verbal cuing, Assistance  Therapist provided: cues to

## 2025-04-29 ENCOUNTER — HOSPITAL ENCOUNTER (OUTPATIENT)
Dept: PHYSICAL THERAPY | Age: 80
Setting detail: THERAPIES SERIES
Discharge: HOME | End: 2025-04-29
Attending: PAIN MEDICINE
Payer: MEDICARE

## 2025-04-29 PROCEDURE — 97110 THERAPEUTIC EXERCISES: CPT

## 2025-04-29 PROCEDURE — 97112 NEUROMUSCULAR REEDUCATION: CPT

## 2025-04-29 NOTE — PROGRESS NOTES
[facility]  Outpatient Physical Therapy   Treatment Note        Date: 2025  Patient: Malka Jones  : 1945   Confirmed: Yes  MRN: [id number]  Referring Provider: Malia Ibarra MD      Medical Diagnosis: Cervical spondylosis [M47.812]  Neck stiffness [M43.6]  Parkinson's disease, unspecified whether dyskinesia present, unspecified whether manifestations fluctuate (HCC) [G20.A1]  Myofascial pain [M79.18]      Treatment Diagnosis: neck pain, impaired balance, impaired strength    Visit Information:  Insurance: Payor: HUMANA MEDICARE / Plan: HUMANA CHOICE-PPO MEDICARE / Product Type: *No Product type* /   PT Visit Information  PT Insurance Information: [employer] Medicare  Total # of Visits to Date: 7  Plan of Care/Certification Expiration Date: 25  No Show: 1  Progress Note Due Date: 25  Canceled Appointment: 4  Progress Note Counter: 3/8 (8 visits approved from  to ) corrected count    Subjective Information:  Subjective: Patient reports she is stiff today.  HEP Compliance:  [x] Good [] Fair [] Poor [] Reports not doing due to:             Pain Screening  Patient Currently in Pain: Denies    Treatment:  Exercises:  Exercises  Exercise 1: Scap retractions 3'' x10  Exercise 2: cervical rotation 3 sec hold x 10  Exercise 3: upper trap stretch 10 sec hold x 3, antonina  Exercise 4: levator scap stretch 10 sec hold x 3, antonina  Exercise 11: sit to stands x 10  Exercise 12: foam balance: FA, weightshifts, head turns with SBA-CGA with UE support; toe taps with foam x 10  Exercise 13: gait drills: lateral, marching, retro, tandem x 1-2 lap  Exercise 17: 6'' caitlin ambulation in // bars forward, lateral (varying UE support)  Exercise 19: SciFit L2 x 6 minutes  Exercise 20: HEP: continue with curent         *Indicates exercise, modality, or manual techniques to be initiated when appropriate      Assessment:   Body Structures, Functions, Activity Limitations Requiring Skilled Therapeutic

## 2025-05-01 ENCOUNTER — HOSPITAL ENCOUNTER (OUTPATIENT)
Dept: PHYSICAL THERAPY | Age: 80
Setting detail: THERAPIES SERIES
Discharge: HOME | End: 2025-05-01
Attending: PAIN MEDICINE
Payer: MEDICARE

## 2025-05-01 PROCEDURE — 97112 NEUROMUSCULAR REEDUCATION: CPT

## 2025-05-01 PROCEDURE — 97140 MANUAL THERAPY 1/> REGIONS: CPT

## 2025-05-01 PROCEDURE — 97110 THERAPEUTIC EXERCISES: CPT

## 2025-05-01 NOTE — PROGRESS NOTES
[facility]  Outpatient Physical Therapy   Treatment Note        Date: 2025  Patient: Malka Jones  : 1945   Confirmed: Yes  MRN: [id number]  Referring Provider: Malia Ibarra MD      Medical Diagnosis: Cervical spondylosis [M47.812]  Neck stiffness [M43.6]  Parkinson's disease, unspecified whether dyskinesia present, unspecified whether manifestations fluctuate (HCC) [G20.A1]  Myofascial pain [M79.18]      Treatment Diagnosis: neck pain, impaired balance, impaired strength    Visit Information:  Insurance: Payor: HUMANA MEDICARE / Plan: HUMANA CHOICE-PPO MEDICARE / Product Type: *No Product type* /   PT Visit Information  PT Insurance Information: [employer] Medicare  Total # of Visits to Date: 8  Plan of Care/Certification Expiration Date: 25  No Show: 1  Progress Note Due Date: 25  Canceled Appointment: 4  Progress Note Counter:  (8 visits approved from  to ) corrected count    Subjective Information:  Subjective: Pt reports HEp compliance  HEP Compliance:  [x] Good [] Fair [] Poor [] Reports not doing due to:      Pain Screening  Patient Currently in Pain: Denies    Treatment:  Exercises:  Exercises  Exercise 1: Scap retractions 3'' x10  Exercise 2: cervical rotation 3 sec hold x 10  Exercise 3: upper trap stretch 10 sec hold x 3, antonina  Exercise 4: levator scap stretch 10 sec hold x 3, antonina  Exercise 11: sit to stands x 10 93 withno ue 7 with 1 UE  Exercise 12: foam balance: FA,Ft EO EC weightshifts, head turns with SBA-CGA with UE support; march x 10  Exercise 13: gait drills: lateral, marching, retro, tandem x 1-2 lap  Exercise 17: 6'' caitlin ambulation in // bars forward, lateral (varying UE support)  Exercise 19: SciFit L2 x 6 minutes       Manual:   Manual Therapy  Soft Tissue Mobilizaton: stm to antonina UT and cervical 8 min  Treatment Reasoning  Limitations addressed: Joint motion, Painful spasm, Tissue extensibility      *Indicates exercise, modality, or manual

## 2025-05-05 ENCOUNTER — OFFICE VISIT (OUTPATIENT)
Age: 80
End: 2025-05-05
Payer: MEDICARE

## 2025-05-05 VITALS
TEMPERATURE: 97.4 F | HEART RATE: 68 BPM | OXYGEN SATURATION: 91 % | BODY MASS INDEX: 25.4 KG/M2 | HEIGHT: 62 IN | WEIGHT: 138 LBS

## 2025-05-05 DIAGNOSIS — M77.8 TENDINITIS OF LEFT SHOULDER: Primary | ICD-10-CM

## 2025-05-05 PROCEDURE — 1160F RVW MEDS BY RX/DR IN RCRD: CPT | Performed by: PHYSICIAN ASSISTANT

## 2025-05-05 PROCEDURE — G8427 DOCREV CUR MEDS BY ELIG CLIN: HCPCS | Performed by: PHYSICIAN ASSISTANT

## 2025-05-05 PROCEDURE — 20610 DRAIN/INJ JOINT/BURSA W/O US: CPT | Performed by: PHYSICIAN ASSISTANT

## 2025-05-05 PROCEDURE — 1123F ACP DISCUSS/DSCN MKR DOCD: CPT | Performed by: PHYSICIAN ASSISTANT

## 2025-05-05 PROCEDURE — 1090F PRES/ABSN URINE INCON ASSESS: CPT | Performed by: PHYSICIAN ASSISTANT

## 2025-05-05 PROCEDURE — 1125F AMNT PAIN NOTED PAIN PRSNT: CPT | Performed by: PHYSICIAN ASSISTANT

## 2025-05-05 PROCEDURE — G8419 CALC BMI OUT NRM PARAM NOF/U: HCPCS | Performed by: PHYSICIAN ASSISTANT

## 2025-05-05 PROCEDURE — 99214 OFFICE O/P EST MOD 30 MIN: CPT | Performed by: PHYSICIAN ASSISTANT

## 2025-05-05 PROCEDURE — G8399 PT W/DXA RESULTS DOCUMENT: HCPCS | Performed by: PHYSICIAN ASSISTANT

## 2025-05-05 PROCEDURE — 1159F MED LIST DOCD IN RCRD: CPT | Performed by: PHYSICIAN ASSISTANT

## 2025-05-05 PROCEDURE — 1036F TOBACCO NON-USER: CPT | Performed by: PHYSICIAN ASSISTANT

## 2025-05-05 RX ORDER — LIDOCAINE HYDROCHLORIDE 10 MG/ML
8 INJECTION, SOLUTION INFILTRATION; PERINEURAL ONCE
Status: COMPLETED | OUTPATIENT
Start: 2025-05-05 | End: 2025-05-05

## 2025-05-05 RX ORDER — TRIAMCINOLONE ACETONIDE 40 MG/ML
80 INJECTION, SUSPENSION INTRA-ARTICULAR; INTRAMUSCULAR ONCE
Status: COMPLETED | OUTPATIENT
Start: 2025-05-05 | End: 2025-05-05

## 2025-05-05 RX ADMIN — LIDOCAINE HYDROCHLORIDE 8 ML: 10 INJECTION, SOLUTION INFILTRATION; PERINEURAL at 10:18

## 2025-05-05 RX ADMIN — TRIAMCINOLONE ACETONIDE 80 MG: 40 INJECTION, SUSPENSION INTRA-ARTICULAR; INTRAMUSCULAR at 10:17

## 2025-05-05 ASSESSMENT — ENCOUNTER SYMPTOMS
GASTROINTESTINAL NEGATIVE: 1
RESPIRATORY NEGATIVE: 1
EYES NEGATIVE: 1

## 2025-05-05 NOTE — PROGRESS NOTES
Malka Jones (:  1945) is a 80 y.o. female,Established patient, here for evaluation of the following chief complaint(s):  Follow-up (Left shoulder pain )         Assessment & Plan  Tendinitis of left shoulder   Chronic, not at goal (unstable), changes made today: Cortisone injection today    Orders:    DRAIN/INJECT LARGE JOINT/BURSA    lidocaine 1 % injection 8 mL    triamcinolone acetonide (KENALOG-40) injection 80 mg      Assessment & Plan  1. Left shoulder pain: Administer cortisone injection today.    2. Right shoulder flushing: Facial flushing from previous cortisone injection. Recommend OTC Benadryl 25 mg BID.    3. Sagittal band injury: Continue brace use for the month. Inform therapist of discomfort for adjustments, such as adding moleskin.    Follow-up in 3 months.    PROCEDURE  Administered steroid injection to left shoulder today.        No follow-ups on file.       Subjective   History of Present Illness  The patient is an 80-year-old female presenting with left shoulder pain after a fall in the bathroom. She is accompanied by her .    Left Shoulder Pain  She reports discomfort in her left shoulder.  - Onset: After a fall in the bathroom.  - Location: Left shoulder.  - Character: Discomfort.    Right Shoulder Improvement and Facial Flushing  Her right shoulder improved after a cortisone injection, but she experiences facial flushing as a side effect. The flushing does not interfere with her sleep.  - Onset: After cortisone injection.  - Location: Right shoulder (improvement), face (flushing).  - Character: Improvement in shoulder pain, facial flushing.  - Alleviating/Aggravating Factors: Cortisone injection improved shoulder pain.  - Severity: Flushing does not interfere with sleep.    Previously Fractured Hand and Brace Irritation  She is concerned about her previously fractured hand. The brace causes irritation to two fingers, leading to difficulty sleeping. She did not wear the

## 2025-05-06 ENCOUNTER — HOSPITAL ENCOUNTER (OUTPATIENT)
Dept: PHYSICAL THERAPY | Age: 80
Setting detail: THERAPIES SERIES
Discharge: HOME | End: 2025-05-06
Attending: PAIN MEDICINE
Payer: MEDICARE

## 2025-05-06 PROCEDURE — 97140 MANUAL THERAPY 1/> REGIONS: CPT

## 2025-05-06 PROCEDURE — 97116 GAIT TRAINING THERAPY: CPT

## 2025-05-06 PROCEDURE — 97112 NEUROMUSCULAR REEDUCATION: CPT

## 2025-05-06 PROCEDURE — 97110 THERAPEUTIC EXERCISES: CPT

## 2025-05-06 ASSESSMENT — PAIN SCALES - GENERAL: PAINLEVEL_OUTOF10: 7

## 2025-05-06 ASSESSMENT — PAIN DESCRIPTION - DESCRIPTORS: DESCRIPTORS: SORE

## 2025-05-06 ASSESSMENT — PAIN DESCRIPTION - LOCATION: LOCATION: NECK

## 2025-05-06 NOTE — PROGRESS NOTES
[facility]  Outpatient Physical Therapy   Treatment Note        Date: 2025  Patient: Malka Jones  : 1945   Confirmed: Yes  MRN: [id number]  Referring Provider: Malia Ibarra MD      Medical Diagnosis: Cervical spondylosis [M47.812]  Neck stiffness [M43.6]  Parkinson's disease, unspecified whether dyskinesia present, unspecified whether manifestations fluctuate (HCC) [G20.A1]  Myofascial pain [M79.18]      Treatment Diagnosis: neck pain, impaired balance, impaired strength    Visit Information:  Insurance: Payor: HUMANA MEDICARE / Plan: HUMANA CHOICE-PPO MEDICARE / Product Type: *No Product type* /   PT Visit Information  PT Insurance Information: [employer] Medicare  Total # of Visits to Date: 9  Plan of Care/Certification Expiration Date: 25  No Show: 1  Progress Note Due Date: 25  Canceled Appointment: 4  Progress Note Counter:  (8 visits approved from  to ) corrected count    Subjective Information:  Subjective: Pt reports that the parkinsons is really bothering her the last 2 day trying to get into neorologist sooner.  HEP Compliance:  [x] Good [] Fair [] Poor [] Reports not doing due to:      Pain Screening  Patient Currently in Pain: Yes  Pain Assessment: 0-10  Pain Level: 7  Pain Location: Neck  Pain Descriptors: Sore    Treatment:  Exercises:  Exercises  Exercise 1: Scap retractions 3'' x10  Exercise 2: cervical rotation 3 sec hold x 10  Exercise 3: upper trap stretch 10 sec hold x 3, antonina  Exercise 4: levator scap stretch 10 sec hold x 3, antonina  Exercise 11: sit to stands x 10  Exercise 12: foam balance: FA,Ft EO EC weightshifts, head turns with SBA-CGA with UE support; march x 10  Exercise 14: foam SLS 2 UE  Exercise 15: step onto foam f/l 2 UE  Exercise 17: 6'' caitlin ambulation in // bars forward, lateral (varying UE support)  Exercise 19: SciFit L2 x 6 minutes       Manual:   Manual Therapy  Soft Tissue Mobilizaton: stm to antonina UT and cervical 8

## 2025-05-08 ENCOUNTER — HOSPITAL ENCOUNTER (OUTPATIENT)
Dept: PHYSICAL THERAPY | Age: 80
Setting detail: THERAPIES SERIES
Discharge: HOME | End: 2025-05-08
Attending: PAIN MEDICINE
Payer: MEDICARE

## 2025-05-08 PROCEDURE — 97112 NEUROMUSCULAR REEDUCATION: CPT

## 2025-05-08 PROCEDURE — 97110 THERAPEUTIC EXERCISES: CPT

## 2025-05-08 ASSESSMENT — PAIN DESCRIPTION - DESCRIPTORS: DESCRIPTORS: SORE

## 2025-05-08 ASSESSMENT — PAIN DESCRIPTION - LOCATION: LOCATION: NECK

## 2025-05-08 ASSESSMENT — PAIN SCALES - GENERAL: PAINLEVEL_OUTOF10: 7

## 2025-05-08 NOTE — PLAN OF CARE
[facility]  PHYSICAL THERAPY PLAN OF CARE                                    [facility] [place] [address]     Ph: [phone]  Fax: [phone]    [] Certification  [] Recertification []  Plan of Care  [x] Progress Note [] Discharge      Referring Provider: Malia Ibarra MD    From:  Shy Barrera PT DPT    Patient: Malka Jones (80 y.o. female) : 1945 Date: 2025   Medical Diagnosis: Cervical spondylosis [M47.812]  Neck stiffness [M43.6]  Parkinson's disease, unspecified whether dyskinesia present, unspecified whether manifestations fluctuate (HCC) [G20.A1]  Myofascial pain [M79.18]    Treatment Diagnosis: neck pain, impaired balance, impaired strength    Plan of Care/Certification Expiration Date: 25   Progress Report Period from: 25 to 2025    Visits to Date: 9 No Show: 1 Cancelled Appts: 4    OBJECTIVE:   Short Term Goals - Time Frame for Short Term Goals: 4 weeks    Goals Current/Discharge status  Status   Short Term Goal 1: Patient will report </= 3/10 pain in cervical region with turning her head for driving.  STG 1 Current Status:: pt reports 8/10 with driving in neck   In progress   Short Term Goal 2: Patient will demonstrate improved upright posture without verbal cues.  Cont  Vc needed for posture    In progress   Short Term Goal 3: Patient will be independent with HEP.  STG 3 Current Status:: pt reports I with current HEP   In progress     Long Term Goals - Time Frame for Long Term Goals : 6 weeks  Goals Current/ Discharge status Status   Long Term Goal 1: Patient will increase cervical AROM >/= 5-10 degrees for improved tolerance with looking with driving. LTG 1 Current Status:: cervical arom flexion 25 extension 16 antonina SB (was flexion 17, extension 6, R SB 6 L 5)   In progress, Partially met   Long Term Goal 2: Patient will increase strength in bilateral UEs and LEs >/= 4+/5 for improved tolerance with ADLs. LTG 2 Current Status:: 25 Strength

## 2025-05-08 NOTE — PROGRESS NOTES
[facility]  Outpatient Physical Therapy   Treatment Note        Date: 2025  Patient: Malka Jones  : 1945   Confirmed: Yes  MRN: [id number]  Referring Provider: Malia Ibarra MD      Medical Diagnosis: Cervical spondylosis [M47.812]  Neck stiffness [M43.6]  Parkinson's disease, unspecified whether dyskinesia present, unspecified whether manifestations fluctuate (HCC) [G20.A1]  Myofascial pain [M79.18]      Treatment Diagnosis: neck pain, impaired balance, impaired strength    Visit Information:  Insurance: Payor: HUMANA MEDICARE / Plan: HUMANA CHOICE-PPO MEDICARE / Product Type: *No Product type* /   PT Visit Information  PT Insurance Information: [employer] Medicare  Total # of Visits to Date: 9  Plan of Care/Certification Expiration Date: 25  No Show: 1  Progress Note Due Date: 25  Canceled Appointment: 4  Progress Note Counter:  (8 visits approved from  to ) corrected count    Subjective Information:  Subjective: Pt reports that neck is sore today.  HEP Compliance:  [x] Good [] Fair [] Poor [] Reports not doing due to:      Pain Screening  Patient Currently in Pain: Yes  Pain Assessment: 0-10  Pain Level: 7  Pain Location: Neck  Pain Descriptors: Sore    Treatment:  Exercises:  Exercises  Exercise 1: Scap retractions 3'' x10  Exercise 2: cervical rotation 3 sec hold x 10  Exercise 3: upper trap stretch 10 sec hold x 3, antonina  Exercise 9: law task  Exercise 11: sit to stands x 10  Exercise 12: foam balance: FA,Ft EO EC weightshifts, head turns with SBA-CGA with UE support; march x 10    *Indicates exercise, modality, or manual techniques to be initiated when appropriate    Objective Measures:   Strength RLE  R Hip Flexion: 4/5  R Hip ABduction: 4/5  R Knee Flexion: 4+/5  R Knee Extension: 4+/5  R Ankle Dorsiflexion: 5/5  Strength LLE  L Hip Flexion: 4/5  L Hip ABduction: 4/5  L Knee Flexion: 4/5  L Knee Extension: 4+/5  L Ankle Dorsiflexion: 5/5  Strength RUE  R

## 2025-05-13 ENCOUNTER — HOSPITAL ENCOUNTER (OUTPATIENT)
Dept: PHYSICAL THERAPY | Age: 80
Setting detail: THERAPIES SERIES
Discharge: HOME | End: 2025-05-13
Attending: PAIN MEDICINE
Payer: MEDICARE

## 2025-05-13 NOTE — PROGRESS NOTES
Therapy                            Cancellation/No-show Note    Date: 2025  Patient: Malka Jones (80 y.o. female)  : 1945  MRN:  [id number]  Referring Physician: Malia Ibarra MD    Medical Diagnosis: Cervical spondylosis [M47.812]  Neck stiffness [M43.6]  Parkinson's disease, unspecified whether dyskinesia present, unspecified whether manifestations fluctuate (HCC) [G20.A1]  Myofascial pain [M79.18]      Visit Information:  Insurance: Payor: HUMANA MEDICARE / Plan: HUMANA CHOICE-PPO MEDICARE / Product Type: *No Product type* /   Visits to Date: 9   No Show/Cancelled Appts:       For today's appointment patient:  [x]  Cancelled  []  Rescheduled appointment  []  No-show   []  Called pt to remind of next appointment     Reason given by patient:  [x]  Patient ill  []  Conflicting appointment  []  No transportation    []  Conflict with work  []  No reason given  []  Other:      [x] Pt has future appointments scheduled, no follow up needed  [] Pt requests to be on hold.    Reason:   If > 2 weeks please discuss with therapist.  [] Therapist to call pt for follow up  [] [place] to call pt to reschedule   Comments:       Signature: Electronically signed by Kadi Blackmon PTA on 25 at 7:54 AM EDT

## 2025-05-15 ENCOUNTER — HOSPITAL ENCOUNTER (OUTPATIENT)
Dept: PHYSICAL THERAPY | Age: 80
Setting detail: THERAPIES SERIES
Discharge: HOME | End: 2025-05-15
Attending: PAIN MEDICINE
Payer: MEDICARE

## 2025-05-15 NOTE — PROGRESS NOTES
Therapy                            Cancellation/No-show Note    Date: 05/15/2025  Patient: Malka Jones (80 y.o. female)  : 1945  MRN:  [id number]  Referring Physician: Malia Ibarra MD    Medical Diagnosis: Cervical spondylosis [M47.812]  Neck stiffness [M43.6]  Parkinson's disease, unspecified whether dyskinesia present, unspecified whether manifestations fluctuate (HCC) [G20.A1]  Myofascial pain [M79.18]      Visit Information:  Insurance: Payor: HUMANA MEDICARE / Plan: HUMANA CHOICE-PPO MEDICARE / Product Type: *No Product type* /   Visits to Date: 9   No Show/Cancelled Appts:       For today's appointment patient:  []  Cancelled  []  Rescheduled appointment  []  No-show   []  Called pt to remind of next appointment     Reason given by patient:  []  Patient ill  [x]  Conflicting appointment  []  No transportation    []  Conflict with work  []  No reason given  []  Other:      [] Pt has future appointments scheduled, no follow up needed  [] Pt requests to be on hold.    Reason:   If > 2 weeks please discuss with therapist.  [x] Therapist to call pt for follow up  [x] [place] to call pt to reschedule   Comments:       Signature: Electronically signed by Tami Yanez PTA on 5/15/25 at 9:10 AM EDT

## 2025-05-28 ENCOUNTER — HOSPITAL ENCOUNTER (OUTPATIENT)
Dept: PHYSICAL THERAPY | Age: 80
Setting detail: THERAPIES SERIES
Discharge: HOME | End: 2025-05-28
Attending: PAIN MEDICINE
Payer: MEDICARE

## 2025-05-28 PROCEDURE — 97110 THERAPEUTIC EXERCISES: CPT

## 2025-05-28 PROCEDURE — 97112 NEUROMUSCULAR REEDUCATION: CPT

## 2025-05-28 ASSESSMENT — PAIN DESCRIPTION - DESCRIPTORS: DESCRIPTORS: ACHING

## 2025-05-28 ASSESSMENT — PAIN SCALES - GENERAL: PAINLEVEL_OUTOF10: 8

## 2025-05-28 ASSESSMENT — PAIN DESCRIPTION - LOCATION: LOCATION: HAND;HIP

## 2025-05-28 NOTE — PROGRESS NOTES
[facility]  Outpatient Physical Therapy    Treatment Note        Date: 2025  Patient: Malka Jones  : 1945   Confirmed: Yes  MRN: [id number]  Referring Provider: Malia Ibarra MD    Medical Diagnosis: Cervical spondylosis [M47.812]  Neck stiffness [M43.6]  Parkinson's disease, unspecified whether dyskinesia present, unspecified whether manifestations fluctuate (HCC) [G20.A1]  Myofascial pain [M79.18]       Treatment Diagnosis: neck pain, impaired balance, impaired strength    Visit Information:  Insurance: Payor: HUMANA MEDICARE / Plan: HUMANA CHOICE-PPO MEDICARE / Product Type: *No Product type* /   PT Visit Information  PT Insurance Information: [employer] Medicare  Total # of Visits to Date: 10  Plan of Care/Certification Expiration Date: 25  No Show: 1  Progress Note Due Date: 25  Canceled Appointment: 6  Progress Note Counter:  (8 visits 25 to 25)    Subjective Information:  Subjective: \"My Parkinson's is just bad lately.\" Patient reports just getting over a UTI, saw Internal Medicine Dr who states Parkinsons flare up is most likely from UTI.  HEP Compliance:  [] Good [x] Fair [] Poor [] Reports not doing due to:               Pain Screening  Patient Currently in Pain: Yes  Pain Level: 8  Pain Location: Hand, Hip  Pain Descriptors: Aching    Treatment:  Exercises:  Exercises  Exercise 5: Standing rows YTB 3'' x10  Exercise 7: Dual tasking: ambulation F/L with boom whackers  Exercise 11: sit to stands x 10 without UE use  Exercise 13: Gait ladder: Forward, march, lateral  Exercise 14: Seated power ups x10  Exercise 19: SciFit L2 x 6 minutes  Exercise 20: HEP: continue with curent + seated power ups, cross body reaching         *Indicates exercise, modality, or manual techniques to be initiated when appropriate    Objective Measures:                  Ambulation  Surface: Carpet  Device: Single point cane  Assistance: Supervision  Quality of Gait: flexed posture,

## 2025-06-02 ENCOUNTER — HOSPITAL ENCOUNTER (OUTPATIENT)
Dept: PHYSICAL THERAPY | Age: 80
Setting detail: THERAPIES SERIES
Discharge: HOME | End: 2025-06-02
Attending: PAIN MEDICINE
Payer: MEDICARE

## 2025-06-02 PROCEDURE — 97112 NEUROMUSCULAR REEDUCATION: CPT

## 2025-06-02 PROCEDURE — 97110 THERAPEUTIC EXERCISES: CPT

## 2025-06-02 ASSESSMENT — PAIN SCALES - GENERAL: PAINLEVEL_OUTOF10: 8

## 2025-06-02 ASSESSMENT — PAIN DESCRIPTION - LOCATION: LOCATION: ARM;BACK

## 2025-06-02 ASSESSMENT — PAIN DESCRIPTION - DESCRIPTORS: DESCRIPTORS: ACHING

## 2025-06-02 NOTE — PROGRESS NOTES
[facility]  Outpatient Physical Therapy    Treatment Note        Date: 2025  Patient: Malka Jones  : 1945   Confirmed: Yes  MRN: [id number]  Referring Provider: Malia Ibarra MD    Medical Diagnosis: Cervical spondylosis [M47.812]  Neck stiffness [M43.6]  Parkinson's disease, unspecified whether dyskinesia present, unspecified whether manifestations fluctuate (HCC) [G20.A1]  Myofascial pain [M79.18]       Treatment Diagnosis: neck pain, impaired balance, impaired strength    Visit Information:  Insurance: Payor: HUMANA MEDICARE / Plan: HUMANA CHOICE-PPO MEDICARE / Product Type: *No Product type* /   PT Visit Information  PT Insurance Information: [employer] Medicare  Total # of Visits to Date: 11  Plan of Care/Certification Expiration Date: 25  No Show: 1  Progress Note Due Date: 25  Canceled Appointment: 6  Progress Note Counter:  (2/8 visits 25 to 25)    Subjective Information:  Subjective: Patient reports not doing well lately. \"I slept all weekend and woke up even worse.\" Patient reports she just finished antibiotic on Saturday.  HEP Compliance:  [x] Good [] Fair [] Poor [] Reports not doing due to:               Pain Screening  Patient Currently in Pain: Yes  Pain Level: 8  Pain Location: Arm, Back  Pain Descriptors: Aching    Treatment:  Exercises:  Exercises  Exercise 9: Obj measures for POC: MMT, Duran 37/56, NDI 12/50  Exercise 11: Sit to stand blocked practice  Exercise 14: Seated power ups x10  Exercise 15: Standing lumbar ext at // bars  Exercise 19: SciFit L2.5 x 6 minutes  Exercise 20: HEP: continue with curent            *Indicates exercise, modality, or manual techniques to be initiated when appropriate    Objective Measures:      Ambulation  Surface: Carpet  Device: Single point cane  Assistance: Supervision  Quality of Gait: flexed posture, short flat steps with occ cues for S/C placement  Distance: 50ft- distance not focused              Strength:

## 2025-06-02 NOTE — PLAN OF CARE
[facility]  PHYSICAL THERAPY PLAN OF CARE                                    [facility] [place] [address]     Ph: [phone]  Fax: [phone]    [] Certification  [x] Recertification [x]  Plan of Care  [] Progress Note [] Discharge      Referring Provider: Malia Ibarra MD    From:  Shy Barrera, PT, DPT    Patient: Malka Jones (80 y.o. female) : 1945 Date: 2025   Medical Diagnosis: Cervical spondylosis [M47.812]  Neck stiffness [M43.6]  Parkinson's disease, unspecified whether dyskinesia present, unspecified whether manifestations fluctuate (HCC) [G20.A1]  Myofascial pain [M79.18]    Treatment Diagnosis: neck pain, impaired balance, impaired strength    Plan of Care/Certification Expiration Date: 25   Progress Report Period from: 2025 to 2025    Visits to Date: 11 No Show: 1 Cancelled Appts: 6    OBJECTIVE:   Short Term Goals - Time Frame for Short Term Goals: 4 weeks    Goals Current/Discharge status  Status   Short Term Goal 1: Patient will report </= 3/10 pain in cervical region with turning her head for driving.   Patient reports improved pain with driving. 5/10 cervical pain discomfort with cervical rotation   In progress   Short Term Goal 2: Patient will demonstrate improved upright posture without verbal cues.   VCs for posture throughout sessions.    In progress   Short Term Goal 3: Patient will be independent with HEP.   Patient reports compliance.    In progress     Long Term Goals - Time Frame for Long Term Goals : 6 weeks  Goals Current/ Discharge status Status   Long Term Goal 1: Patient will increase cervical AROM >/= 5-10 degrees for improved tolerance with looking with driving.    As of 5/3: cervical arom flexion 25 extension 16 antonina SB ( flexion 17, extension 6, R SB 6 L 5 previously)    In progress, Partially met   Long Term Goal 2: Patient will increase strength in bilateral UEs and LEs >/= 4+/5 for improved tolerance with ADLs.

## 2025-06-05 ENCOUNTER — HOSPITAL ENCOUNTER (OUTPATIENT)
Dept: PHYSICAL THERAPY | Age: 80
Setting detail: THERAPIES SERIES
Discharge: HOME | End: 2025-06-05
Attending: PAIN MEDICINE
Payer: MEDICARE

## 2025-06-05 PROCEDURE — 97112 NEUROMUSCULAR REEDUCATION: CPT

## 2025-06-05 PROCEDURE — 97110 THERAPEUTIC EXERCISES: CPT

## 2025-06-05 ASSESSMENT — PAIN SCALES - GENERAL: PAINLEVEL_OUTOF10: 8

## 2025-06-05 ASSESSMENT — PAIN DESCRIPTION - DESCRIPTORS: DESCRIPTORS: ACHING

## 2025-06-05 ASSESSMENT — PAIN DESCRIPTION - LOCATION: LOCATION: GENERALIZED

## 2025-06-05 NOTE — PROGRESS NOTES
[facility]  Outpatient Physical Therapy    Treatment Note        Date: 2025  Patient: Malka Jones  : 1945   Confirmed: Yes  MRN: [id number]  Referring Provider: Malia Ibarra MD    Medical Diagnosis: Cervical spondylosis [M47.812]  Neck stiffness [M43.6]  Parkinson's disease, unspecified whether dyskinesia present, unspecified whether manifestations fluctuate (HCC) [G20.A1]  Myofascial pain [M79.18]       Treatment Diagnosis: neck pain, impaired balance, impaired strength    Visit Information:  Insurance: Payor: HUMANA MEDICARE / Plan: HUMANA CHOICE-PPO MEDICARE / Product Type: *No Product type* /   PT Visit Information  PT Insurance Information: [employer] Medicare  Total # of Visits to Date: 11  Plan of Care/Certification Expiration Date: 25  No Show: 1  Progress Note Due Date: 25  Canceled Appointment: 6  Progress Note Counter: 3/8 visits 25 to 25    Subjective Information:  Subjective: Patient reports having Lab work done but difficulty collecting urine sample due to retention.  HEP Compliance:  [x] Good [] Fair [] Poor [] Reports not doing due to:               Pain Screening  Patient Currently in Pain: Yes  Pain Level: 8  Pain Location: Generalized  Pain Descriptors: Aching    Treatment:  Exercises:  Exercises  Exercise 1: Trunk rotation, double arm raise 2# weighted ball x10 in stand  Exercise 11: Sit to stand blocked practice; holding 2# weighted ball  Exercise 13: Gait ladder: Forward, march, lateral, over canes, DGI tasks  Exercise 15: Standing lumbar ext at // bars  Exercise 18: Foam standing: weightshifting, static stand, supported SLS  Exercise 19: SciFit L2.5 x 6 minutes  Exercise 20: HEP: continue with curent           *Indicates exercise, modality, or manual techniques to be initiated when appropriate        Assessment:   Body Structures, Functions, Activity Limitations Requiring Skilled Therapeutic Intervention: Decreased functional mobility ,

## 2025-06-09 ENCOUNTER — HOSPITAL ENCOUNTER (OUTPATIENT)
Dept: PHYSICAL THERAPY | Age: 80
Setting detail: THERAPIES SERIES
Discharge: HOME | End: 2025-06-09
Attending: PAIN MEDICINE
Payer: MEDICARE

## 2025-06-09 PROCEDURE — 97535 SELF CARE MNGMENT TRAINING: CPT

## 2025-06-09 PROCEDURE — 97112 NEUROMUSCULAR REEDUCATION: CPT

## 2025-06-09 PROCEDURE — 97110 THERAPEUTIC EXERCISES: CPT

## 2025-06-09 ASSESSMENT — PAIN DESCRIPTION - DESCRIPTORS: DESCRIPTORS: ACHING

## 2025-06-09 ASSESSMENT — PAIN DESCRIPTION - LOCATION: LOCATION: GENERALIZED

## 2025-06-09 ASSESSMENT — PAIN SCALES - GENERAL: PAINLEVEL_OUTOF10: 2

## 2025-06-09 NOTE — PROGRESS NOTES
[facility]  Outpatient Physical Therapy    Treatment Note        Date: 2025  Patient: Malka Jones  : 1945   Confirmed: Yes  MRN: [id number]  Referring Provider: Malia Ibarra MD    Medical Diagnosis: Cervical spondylosis [M47.812]  Neck stiffness [M43.6]  Parkinson's disease, unspecified whether dyskinesia present, unspecified whether manifestations fluctuate (HCC) [G20.A1]  Myofascial pain [M79.18]       Treatment Diagnosis: neck pain, impaired balance, impaired strength    Visit Information:  Insurance: Payor: HUMANA MEDICARE / Plan: HUMANA CHOICE-PPO MEDICARE / Product Type: *No Product type* /   PT Visit Information  PT Insurance Information: [employer] Medicare  Total # of Visits to Date: 12  Plan of Care/Certification Expiration Date: 25  No Show: 1  Progress Note Due Date: 25  Canceled Appointment: 6  Progress Note Counter: 4/8 visits 25 to 25    Subjective Information:  Subjective: Patient reports having Lab work done but difficulty collecting urine sample due to retention.  HEP Compliance:  [x] Good [] Fair [] Poor [] Reports not doing due to:    Pain Screening  Patient Currently in Pain: Yes  Pain Assessment: 0-10  Pain Level: 2  Pain Location: Generalized  Pain Descriptors: Aching    Treatment:  Exercises:  Exercises  Exercise 1: Trunk rotation, double arm raise 2# weighted ball x10 in stand  Exercise 5: Standing rows YTB 3'' x10  Exercise 6: Tapping cones lateral vectors, small steps  Exercise 11: Sit to stand blocked practice; holding 2# weighted ball, w/o ball reaching fwd  Exercise 13: Gait ladder: Forward, march, lateral, over canes, DGI tasks  Exercise 17: counting steps ( 10 step goal)  Exercise 18: Foam standing: weightshifting with LOS, static stand with FT, supported SLS, on/off, 0-2 UE support  Exercise 19: SciFit L2.5 x 6 minutes  Exercise 20: HEP: continue with curent+ reaching fwd with STS throughout the day  Treatment Reasoning  Limitations

## 2025-06-12 ENCOUNTER — HOSPITAL ENCOUNTER (OUTPATIENT)
Dept: PHYSICAL THERAPY | Age: 80
Setting detail: THERAPIES SERIES
Discharge: HOME | End: 2025-06-12
Attending: PAIN MEDICINE
Payer: MEDICARE

## 2025-06-12 NOTE — PROGRESS NOTES
Therapy                            Cancellation/No-show Note    Date: 2025  Patient: Malka Jones (80 y.o. female)  : 1945  MRN:  [id number]  Referring Physician: Malia Ibarra MD    Medical Diagnosis: Cervical spondylosis [M47.812]  Neck stiffness [M43.6]  Parkinson's disease, unspecified whether dyskinesia present, unspecified whether manifestations fluctuate (HCC) [G20.A1]  Myofascial pain [M79.18]      Visit Information:  Insurance: Payor: HUMANA MEDICARE / Plan: HUMANA CHOICE-PPO MEDICARE / Product Type: *No Product type* /   Visits to Date: 12   No Show/Cancelled Appts:       For today's appointment patient:  [x]  Cancelled  []  Rescheduled appointment  []  No-show   []  Called pt to remind of next appointment     Reason given by patient:  []  Patient ill  []  Conflicting appointment  []  No transportation    []  Conflict with work  [x]  No reason given  []  Other:      [] Pt has future appointments scheduled, no follow up needed  [] Pt requests to be on hold.    Reason:   If > 2 weeks please discuss with therapist.  [] Therapist to call pt for follow up  [] [place] to call pt to reschedule   Comments:       Signature: Electronically signed by Shy Barrera PT on 25 at 8:07 AM EDT

## 2025-06-25 ENCOUNTER — HOSPITAL ENCOUNTER (OUTPATIENT)
Dept: PHYSICAL THERAPY | Age: 80
Setting detail: THERAPIES SERIES
Discharge: HOME | End: 2025-06-25
Attending: PAIN MEDICINE
Payer: MEDICARE

## 2025-06-25 NOTE — PROGRESS NOTES
Therapy                            Cancellation/No-show Note    Date: 2025  Patient: Malka Jones (80 y.o. female)  : 1945  MRN:  [id number]  Referring Physician: Malia Ibarra MD    Medical Diagnosis: Cervical spondylosis [M47.812]  Neck stiffness [M43.6]  Parkinson's disease, unspecified whether dyskinesia present, unspecified whether manifestations fluctuate (HCC) [G20.A1]  Myofascial pain [M79.18]      Visit Information:  Insurance: Payor: HUMANA MEDICARE / Plan: HUMANA CHOICE-PPO MEDICARE / Product Type: *No Product type* /   Visits to Date: 12   No Show/Cancelled Appts:       For today's appointment patient:  [x]  Cancelled  []  Rescheduled appointment  []  No-show   []  Called pt to remind of next appointment     Reason given by patient:  []  Patient ill  []  Conflicting appointment  []  No transportation    []  Conflict with work  [x]  No reason given  []  Other:      [x] Pt has future appointments scheduled, no follow up needed  [] Pt requests to be on hold.    Reason:   If > 2 weeks please discuss with therapist.  [] Therapist to call pt for follow up  [] [place] to call pt to reschedule   Comments:       Signature: Electronically signed by Amy Nelson PTA on 25 at 8:59 AM EDT

## 2025-06-27 ENCOUNTER — HOSPITAL ENCOUNTER (OUTPATIENT)
Dept: PHYSICAL THERAPY | Age: 80
Setting detail: THERAPIES SERIES
Discharge: HOME | End: 2025-06-27
Attending: PAIN MEDICINE
Payer: MEDICARE

## 2025-06-27 NOTE — PROGRESS NOTES
Therapy                            Cancellation/No-show Note    Date: 2025  Patient: Malka Jones (80 y.o. female)  : 1945  MRN:  [id number]  Referring Physician: Malia Ibarra MD    Medical Diagnosis: Cervical spondylosis [M47.812]  Neck stiffness [M43.6]  Parkinson's disease, unspecified whether dyskinesia present, unspecified whether manifestations fluctuate (HCC) [G20.A1]  Myofascial pain [M79.18]      Visit Information:  Insurance: Payor: HUMANA MEDICARE / Plan: HUMANA CHOICE-PPO MEDICARE / Product Type: *No Product type* /   Visits to Date: 12   No Show/Cancelled Appts:       For today's appointment patient:  [x]  Cancelled  []  Rescheduled appointment  []  No-show   []  Called pt to remind of next appointment     Reason given by patient:  []  Patient ill  [x]  Conflicting appointment  []  No transportation    []  Conflict with work  []  No reason given  []  Other:      [x] Pt has future appointments scheduled, no follow up needed  [] Pt requests to be on hold.    Reason:   If > 2 weeks please discuss with therapist.  [] Therapist to call pt for follow up  []  to call pt to reschedule   Comments:       Signature: Electronically signed by Delaney Palmer PT on 25 at 8:09 AM EDT

## 2025-07-01 ENCOUNTER — TELEPHONE (OUTPATIENT)
Age: 80
End: 2025-07-01

## 2025-07-02 ENCOUNTER — OFFICE VISIT (OUTPATIENT)
Age: 80
End: 2025-07-02
Payer: MEDICARE

## 2025-07-02 VITALS
BODY MASS INDEX: 27.11 KG/M2 | WEIGHT: 148.2 LBS | DIASTOLIC BLOOD PRESSURE: 84 MMHG | SYSTOLIC BLOOD PRESSURE: 128 MMHG | HEART RATE: 90 BPM | RESPIRATION RATE: 14 BRPM | OXYGEN SATURATION: 97 %

## 2025-07-02 VITALS
OXYGEN SATURATION: 96 % | DIASTOLIC BLOOD PRESSURE: 64 MMHG | SYSTOLIC BLOOD PRESSURE: 124 MMHG | WEIGHT: 148 LBS | TEMPERATURE: 93.6 F | BODY MASS INDEX: 27.23 KG/M2 | HEART RATE: 71 BPM | HEIGHT: 62 IN

## 2025-07-02 DIAGNOSIS — I10 HYPERTENSION, UNSPECIFIED TYPE: Primary | ICD-10-CM

## 2025-07-02 DIAGNOSIS — I10 HYPERTENSION, UNSPECIFIED TYPE: ICD-10-CM

## 2025-07-02 DIAGNOSIS — M75.51 SUBACROMIAL BURSITIS OF RIGHT SHOULDER JOINT: ICD-10-CM

## 2025-07-02 DIAGNOSIS — M75.81 ROTATOR CUFF TENDINITIS, RIGHT: Primary | ICD-10-CM

## 2025-07-02 LAB
ANION GAP SERPL CALCULATED.3IONS-SCNC: 10 MEQ/L (ref 9–15)
BUN SERPL-MCNC: 15 MG/DL (ref 8–23)
CALCIUM SERPL-MCNC: 9.4 MG/DL (ref 8.5–9.9)
CHLORIDE SERPL-SCNC: 106 MEQ/L (ref 95–107)
CO2 SERPL-SCNC: 27 MEQ/L (ref 20–31)
CREAT SERPL-MCNC: 0.64 MG/DL (ref 0.5–0.9)
GLUCOSE SERPL-MCNC: 99 MG/DL (ref 70–99)
POTASSIUM SERPL-SCNC: 3.6 MEQ/L (ref 3.4–4.9)
SODIUM SERPL-SCNC: 143 MEQ/L (ref 135–144)

## 2025-07-02 PROCEDURE — 1090F PRES/ABSN URINE INCON ASSESS: CPT | Performed by: INTERNAL MEDICINE

## 2025-07-02 PROCEDURE — 1123F ACP DISCUSS/DSCN MKR DOCD: CPT | Performed by: PHYSICIAN ASSISTANT

## 2025-07-02 PROCEDURE — G8399 PT W/DXA RESULTS DOCUMENT: HCPCS | Performed by: INTERNAL MEDICINE

## 2025-07-02 PROCEDURE — 1036F TOBACCO NON-USER: CPT | Performed by: PHYSICIAN ASSISTANT

## 2025-07-02 PROCEDURE — 1125F AMNT PAIN NOTED PAIN PRSNT: CPT | Performed by: PHYSICIAN ASSISTANT

## 2025-07-02 PROCEDURE — 3079F DIAST BP 80-89 MM HG: CPT | Performed by: INTERNAL MEDICINE

## 2025-07-02 PROCEDURE — 1123F ACP DISCUSS/DSCN MKR DOCD: CPT | Performed by: INTERNAL MEDICINE

## 2025-07-02 PROCEDURE — 20610 DRAIN/INJ JOINT/BURSA W/O US: CPT | Performed by: PHYSICIAN ASSISTANT

## 2025-07-02 PROCEDURE — 1090F PRES/ABSN URINE INCON ASSESS: CPT | Performed by: PHYSICIAN ASSISTANT

## 2025-07-02 PROCEDURE — 3074F SYST BP LT 130 MM HG: CPT | Performed by: INTERNAL MEDICINE

## 2025-07-02 PROCEDURE — 3074F SYST BP LT 130 MM HG: CPT | Performed by: PHYSICIAN ASSISTANT

## 2025-07-02 PROCEDURE — G8427 DOCREV CUR MEDS BY ELIG CLIN: HCPCS | Performed by: PHYSICIAN ASSISTANT

## 2025-07-02 PROCEDURE — 99214 OFFICE O/P EST MOD 30 MIN: CPT | Performed by: PHYSICIAN ASSISTANT

## 2025-07-02 PROCEDURE — G8427 DOCREV CUR MEDS BY ELIG CLIN: HCPCS | Performed by: INTERNAL MEDICINE

## 2025-07-02 PROCEDURE — 1036F TOBACCO NON-USER: CPT | Performed by: INTERNAL MEDICINE

## 2025-07-02 PROCEDURE — 1159F MED LIST DOCD IN RCRD: CPT | Performed by: PHYSICIAN ASSISTANT

## 2025-07-02 PROCEDURE — 3078F DIAST BP <80 MM HG: CPT | Performed by: PHYSICIAN ASSISTANT

## 2025-07-02 PROCEDURE — G8399 PT W/DXA RESULTS DOCUMENT: HCPCS | Performed by: PHYSICIAN ASSISTANT

## 2025-07-02 PROCEDURE — 1126F AMNT PAIN NOTED NONE PRSNT: CPT | Performed by: INTERNAL MEDICINE

## 2025-07-02 PROCEDURE — G8419 CALC BMI OUT NRM PARAM NOF/U: HCPCS | Performed by: PHYSICIAN ASSISTANT

## 2025-07-02 PROCEDURE — 1160F RVW MEDS BY RX/DR IN RCRD: CPT | Performed by: PHYSICIAN ASSISTANT

## 2025-07-02 PROCEDURE — G8419 CALC BMI OUT NRM PARAM NOF/U: HCPCS | Performed by: INTERNAL MEDICINE

## 2025-07-02 PROCEDURE — 1159F MED LIST DOCD IN RCRD: CPT | Performed by: INTERNAL MEDICINE

## 2025-07-02 PROCEDURE — 99214 OFFICE O/P EST MOD 30 MIN: CPT | Performed by: INTERNAL MEDICINE

## 2025-07-02 RX ORDER — LIDOCAINE HYDROCHLORIDE 10 MG/ML
8 INJECTION, SOLUTION INFILTRATION; PERINEURAL ONCE
Status: COMPLETED | OUTPATIENT
Start: 2025-07-02 | End: 2025-07-02

## 2025-07-02 RX ORDER — SPIRONOLACTONE 50 MG/1
50 TABLET, FILM COATED ORAL DAILY
Qty: 90 TABLET | Refills: 3 | Status: SHIPPED | OUTPATIENT
Start: 2025-07-02

## 2025-07-02 RX ORDER — TRIAMCINOLONE ACETONIDE 40 MG/ML
80 INJECTION, SUSPENSION INTRA-ARTICULAR; INTRAMUSCULAR ONCE
Status: COMPLETED | OUTPATIENT
Start: 2025-07-02 | End: 2025-07-02

## 2025-07-02 RX ADMIN — TRIAMCINOLONE ACETONIDE 80 MG: 40 INJECTION, SUSPENSION INTRA-ARTICULAR; INTRAMUSCULAR at 15:11

## 2025-07-02 RX ADMIN — LIDOCAINE HYDROCHLORIDE 8 ML: 10 INJECTION, SOLUTION INFILTRATION; PERINEURAL at 15:10

## 2025-07-02 ASSESSMENT — ENCOUNTER SYMPTOMS
WHEEZING: 0
GASTROINTESTINAL NEGATIVE: 1
STRIDOR: 0
EYES NEGATIVE: 1
GASTROINTESTINAL NEGATIVE: 1
EYES NEGATIVE: 1
NAUSEA: 0
RESPIRATORY NEGATIVE: 1
CHEST TIGHTNESS: 0
SHORTNESS OF BREATH: 1
BLOOD IN STOOL: 0
COUGH: 0

## 2025-07-02 NOTE — PROGRESS NOTES
infection. Has been on AB+X for few months sees Urology at [facility], no cp no sob no falls no bleed trace LE Edema.     11/15/24 doing fine. Now has PD - seeing [facility] Neuro. No cp no sob no falls no bleed    7/2/25 called in to be seen due to 2 week B/L LE Edema.  No falls no bleed occ dull brief CP     No ETOH  Retired- .   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    Carotid artery stenosis     Family history of early CAD 6/28/2016    Fibromyalgia     GERD (gastroesophageal reflux disease)     Hypertension     Neuropathy     Sleep apnea     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 myelopathy or radiculopathy        Past Surgical History:   Procedure Laterality Date    BLADDER SURGERY      stimulator implant    BLADDER SUSPENSION      CHOLECYSTECTOMY      COLONOSCOPY  9/22/2020    COLONOSCOPY WITH BIOPSY AND POLYPECTOMY performed by Evi Salvador MD at [facility]    ENDOSCOPY, COLON, DIAGNOSTIC      HYSTERECTOMY (CERVIX STATUS UNKNOWN)      INNER EAR SURGERY      UPPER GASTROINTESTINAL ENDOSCOPY N/A 9/22/2020    EGD WITH POLYPECTOMY AND DILATION performed by Evi Salvador MD at [facility]    UPPER GASTROINTESTINAL ENDOSCOPY N/A 1/27/2022    EGD ESOPHAGOGASTRODUODENOSCOPY performed by Tevin Reid MD at [facility]       Family History   Problem Relation Age of Onset    Crohn's Disease Sister     Diabetes Sister     Heart Disease Mother     Kidney Disease Mother     Heart Disease Father     Obesity Sister     Heart Disease Sister     Colon Cancer Neg Hx        Social History     Socioeconomic History    Marital status:    Tobacco Use    Smoking status: Never    Smokeless tobacco: Never   Vaping Use    Vaping status: Never Used   Substance and Sexual Activity    Alcohol use: No    Drug use: Never     Social Drivers of Health

## 2025-07-02 NOTE — PROGRESS NOTES
Malka Jones (:  1945) is a 80 y.o. female,Established patient, here for evaluation of the following chief complaint(s):  Follow-up and Pain (rt shoulder 3 month-Pt stated she was in a lot of pain and needed a sooner appt/Symptoms: no strength to , pain. Dr. Olea just put her on water pills due to leg swelling/Pain: 10/10/Pt states on the  she has a Botox injection, she had severe nightmare that night, was worried about reaction to medicine/injection. Had no reaction to her last shouder inj.)         Assessment & Plan  Rotator cuff tendinitis, right   Chronic, worsening (exacerbation), cortisone injection today    Orders:    DRAIN/INJECT LARGE JOINT/BURSA    lidocaine 1 % injection 8 mL    triamcinolone acetonide (KENALOG-40) injection 80 mg    Subacromial bursitis of right shoulder joint   Chronic, worsening (exacerbation), subacromial bursa cortisone injection today    Orders:    DRAIN/INJECT LARGE JOINT/BURSA    lidocaine 1 % injection 8 mL    triamcinolone acetonide (KENALOG-40) injection 80 mg      Assessment & Plan  1. Shoulder pain:  Rotator cuff damaged but functional. Difficulty lifting objects due to pain, can raise arm unburdened. Pain localized with movement. No recent cortisone injection, previous injections effective.    Administered cortisone injection today. Avoid strenuous activities like Beverley or Jazzercise. If hot flashes occur post-injection, take Benadryl. Discussed steroid flare causing hot flashes. Advised warm milk before bed for sleep disturbances.    PROCEDURE  Administered joint injection in the office today.        No follow-ups on file.       Subjective   History of Present Illness  The patient is an 80-year-old female presenting with persistent right shoulder pain, escalating to the point where she is unable to lift objects due to discomfort. She can raise her arm when unburdened. No recent injections for this issue. Left shoulder unaffected.    Right

## 2025-07-07 ENCOUNTER — OFFICE VISIT (OUTPATIENT)
Age: 80
End: 2025-07-07
Payer: MEDICARE

## 2025-07-07 VITALS
HEART RATE: 85 BPM | BODY MASS INDEX: 26.23 KG/M2 | DIASTOLIC BLOOD PRESSURE: 84 MMHG | WEIGHT: 143.4 LBS | SYSTOLIC BLOOD PRESSURE: 130 MMHG

## 2025-07-07 DIAGNOSIS — M96.1 FAILED BACK SYNDROME: ICD-10-CM

## 2025-07-07 DIAGNOSIS — R25.1 TREMORS OF NERVOUS SYSTEM: ICD-10-CM

## 2025-07-07 DIAGNOSIS — M41.9 KYPHOSCOLIOSIS: ICD-10-CM

## 2025-07-07 DIAGNOSIS — G20.A2 PARKINSON'S DISEASE WITHOUT DYSKINESIA, WITH FLUCTUATING MANIFESTATIONS (HCC): Primary | ICD-10-CM

## 2025-07-07 DIAGNOSIS — G50.0 TRIGEMINAL NEURALGIA: ICD-10-CM

## 2025-07-07 PROCEDURE — 3075F SYST BP GE 130 - 139MM HG: CPT | Performed by: PSYCHIATRY & NEUROLOGY

## 2025-07-07 PROCEDURE — 3079F DIAST BP 80-89 MM HG: CPT | Performed by: PSYCHIATRY & NEUROLOGY

## 2025-07-07 PROCEDURE — 1159F MED LIST DOCD IN RCRD: CPT | Performed by: PSYCHIATRY & NEUROLOGY

## 2025-07-07 PROCEDURE — G8399 PT W/DXA RESULTS DOCUMENT: HCPCS | Performed by: PSYCHIATRY & NEUROLOGY

## 2025-07-07 PROCEDURE — 99204 OFFICE O/P NEW MOD 45 MIN: CPT | Performed by: PSYCHIATRY & NEUROLOGY

## 2025-07-07 PROCEDURE — 1036F TOBACCO NON-USER: CPT | Performed by: PSYCHIATRY & NEUROLOGY

## 2025-07-07 PROCEDURE — 1090F PRES/ABSN URINE INCON ASSESS: CPT | Performed by: PSYCHIATRY & NEUROLOGY

## 2025-07-07 PROCEDURE — 1123F ACP DISCUSS/DSCN MKR DOCD: CPT | Performed by: PSYCHIATRY & NEUROLOGY

## 2025-07-07 PROCEDURE — G8419 CALC BMI OUT NRM PARAM NOF/U: HCPCS | Performed by: PSYCHIATRY & NEUROLOGY

## 2025-07-07 PROCEDURE — G8427 DOCREV CUR MEDS BY ELIG CLIN: HCPCS | Performed by: PSYCHIATRY & NEUROLOGY

## 2025-07-07 PROCEDURE — 1125F AMNT PAIN NOTED PAIN PRSNT: CPT | Performed by: PSYCHIATRY & NEUROLOGY

## 2025-07-07 RX ORDER — MELOXICAM 15 MG/1
15 TABLET ORAL DAILY
COMMUNITY
Start: 2025-06-13

## 2025-07-07 RX ORDER — ALPRAZOLAM 0.25 MG
TABLET ORAL
Qty: 30 TABLET | Refills: 0 | Status: SHIPPED | OUTPATIENT
Start: 2025-07-07 | End: 2025-08-07

## 2025-07-07 RX ORDER — CARBAMAZEPINE 100 MG/1
100 TABLET, EXTENDED RELEASE ORAL 2 TIMES DAILY
Qty: 60 TABLET | Refills: 3 | Status: SHIPPED | OUTPATIENT
Start: 2025-07-07

## 2025-07-07 ASSESSMENT — ENCOUNTER SYMPTOMS
SHORTNESS OF BREATH: 0
TROUBLE SWALLOWING: 0
PHOTOPHOBIA: 0
COLOR CHANGE: 0
BACK PAIN: 0
VOMITING: 0
NAUSEA: 0
CHOKING: 0

## 2025-07-07 NOTE — PROGRESS NOTES
neurosurgery in the past.  She is not significant kyphoscoliosis    Patient is more concerned about the tremor and this is more noticeable when she is anxious.  She has otherwise done well in terms of the temporal profile of parkinson's's.  I still have not recommended to start.  Recommended that we.  She has a tremor.  We will follow-up with a LIZETT scan to complete our diagnosis    Patient does have trigeminal neuralgia with significant pain in the right ear.  This is to appointment Intractable J is able to hear sounds on the left.  But if he can wear hearing we have started on Tegretol-XR at 100 mg to see if she can tolerate the medication she will call me and we can always increase this medication this may help her quality of life    Significant had back syndrome followed by pain manage neurosurgery had have not intervene as there is no other options  At this time.    Cognitively she appears to be quite intact keep an eye on this    Ion Siegel MD, EDMUND  Diplomate, American Board of Psychiatry & Neurology  Board Certified in Vascular Neurology  Board Certified in Neuromuscular Medicine  Certified in Neurorehabilitation         Plan:      Orders Placed This Encounter   Procedures    NM TUMOR LOCALIZATION SCAN SPECT     Standing Status:   Future     Expected Date:   2025     Expiration Date:   2026     Reason for exam::   tremors     Orders Placed This Encounter   Medications    ALPRAZolam (XANAX) 0.25 MG tablet     Si to be taken when  significant tremor as needed and not to take more than 1 a day     Dispense:  30 tablet     Refill:  0    carBAMazepine (TEGRETOL-XR) 100 MG extended release tablet     Sig: Take 1 tablet by mouth 2 times daily     Dispense:  60 tablet     Refill:  3       No follow-ups on file.      Ion Siegel MD

## 2025-07-08 ENCOUNTER — HOSPITAL ENCOUNTER (OUTPATIENT)
Dept: PHYSICAL THERAPY | Age: 80
Setting detail: THERAPIES SERIES
Discharge: HOME | End: 2025-07-08
Attending: PAIN MEDICINE

## 2025-07-08 NOTE — PROGRESS NOTES
Therapy                            Cancellation/No-show Note    Date: 2025  Patient: Malka Jones (80 y.o. female)  : 1945  MRN:  [id number]  Referring Physician: Malia Ibarra MD    Medical Diagnosis: Cervical spondylosis [M47.812]  Neck stiffness [M43.6]  Parkinson's disease, unspecified whether dyskinesia present, unspecified whether manifestations fluctuate (HCC) [G20.A1]  Myofascial pain [M79.18]      Visit Information:  Insurance: Payor: [employer] MEDICARE / Plan: [employer] CHOICE-PPO MEDICARE / Product Type: *No Product type* /   Visits to Date: 12   No Show/Cancelled Appts:       For today's appointment patient:  []  Cancelled  []  Rescheduled appointment  [x]  No-show   [x]  Called pt and left voicemail about calling back how she is doing and if she wants to continue     Reason given by patient:  []  Patient ill  []  Conflicting appointment  []  No transportation    []  Conflict with work  []  No reason given  []  Other:      [] Pt has future appointments scheduled, no follow up needed  [] Pt requests to be on hold.    Reason:   If > 2 weeks please discuss with therapist.  [] Therapist to call pt for follow up  [] [place] to call pt to reschedule   Comments:       Signature: Electronically signed by Shy Barrera PT on 25 at 1:50 PM EDT

## 2025-07-16 ENCOUNTER — HOSPITAL ENCOUNTER (OUTPATIENT)
Dept: PHYSICAL THERAPY | Age: 80
Setting detail: THERAPIES SERIES
Discharge: HOME | End: 2025-07-16
Attending: PAIN MEDICINE
Payer: MEDICARE

## 2025-07-16 PROCEDURE — 97112 NEUROMUSCULAR REEDUCATION: CPT

## 2025-07-16 PROCEDURE — 97110 THERAPEUTIC EXERCISES: CPT

## 2025-07-16 ASSESSMENT — PAIN SCALES - GENERAL: PAINLEVEL_OUTOF10: 8

## 2025-07-16 NOTE — PROGRESS NOTES
[facility]  PHYSICAL THERAPY PLAN OF CARE                                    [facility] [place] [address]     Ph: [phone]  Fax: [phone]      [] Certification  [] Recertification []  Plan of Care  [x] Progress Note [] Discharge      Referring Provider: Malia Ibarra MD     From:  Shy Brarera, PT  Patient: Malka Jones (80 y.o. female) : 1945 Date: 2025  Medical Diagnosis: Cervical spondylosis [M47.812]  Neck stiffness [M43.6]  Parkinson's disease, unspecified whether dyskinesia present, unspecified whether manifestations fluctuate (HCC) [G20.A1]  Myofascial pain [M79.18]       Treatment Diagnosis: neck pain, impaired balance, impaired strength    Plan of Care/Certification Expiration Date: : 25   Progress Report Period from:  2025  to 2025    Visits to Date: 14 No Show: 2 Cancelled Appts: 9    OBJECTIVE:   Short Term Goals - Time Frame for Short Term Goals: 4 weeks    Goals Current/Discharge status  Status   Short Term Goal 1: Patient will report </= 3/10 pain in cervical region with turning her head for driving.  Pt reports 5/10 cervical pain with driving In progress   Short Term Goal 2: Patient will demonstrate improved upright posture without verbal cues.  Pt requires cues throughout. Pt displays flexed posture throughout ambulation.  In progress   Short Term Goal 3: Patient will be independent with HEP.  Pt has decrease in motivation and needs encouragement to perform HEP In progress     Long Term Goals - Time Frame for Long Term Goals : 6 weeks  Goals Current/ Discharge status Status   Long Term Goal 1: Patient will increase cervical AROM >/= 5-10 degrees for improved tolerance with looking with driving. AROM Cervical Spine   Cervical flexion: 15  Cervical extension: 18  Cervical right lateral: 11  Cervical left lateral: 20    In progress, Partially met   Long Term Goal 2: Patient will increase strength in bilateral UEs and LEs >/= 4+/5 for 
weeks Goal Status   LTG 1 Patient will increase cervical AROM >/= 5-10 degrees for improved tolerance with looking with driving. In progress, Partially met   LTG 2 Patient will increase strength in bilateral UEs and LEs >/= 4+/5 for improved tolerance with ADLs. In progress, Partially met   LTG 3 Patient will ambulate 150ft independently with LRD with improved bilateral step length and symmetry. In progress   LTG 4 Duran balance >/=45/56 to demonstrate improved balance and reduce fall risk. In progress   LTG 5 NDI </= 17/50 to demonstrate functional improvements. In progress     Plan:  Frequency/Duration:  Plan  Plan Frequency: 2  Plan weeks: 4  Current Treatment Recommendations: Strengthening, ROM, Balance training, Functional mobility training, Transfer training, Endurance training, Gait training, Stair training, Neuromuscular re-education, Home exercise program, Safety education & training, Patient/Caregiver education & training, Equipment evaluation, education, & procurement, Modalities  Modalities: Heat/Cold  Pt to continue current HEP.  See objective section for any therapeutic exercise changes, additions or modifications this date.    Therapy Time:      PT Individual Minutes  Time In: 1304  Time Out: 1408  Minutes: 64  Timed Code Treatment Minutes: 60 Minutes  Procedure Minutes:0  Timed Activity Minutes Units   Ther Ex 30 2   Neuro José Miguel 30 2     Electronically signed by REGAN Cueva on 7/16/25 at 1:06 PM EDT  Treatment directly supervised and directed by Electronically signed by Amy Nelson PTA on 7/16/25 at 2:47 PM EDT

## 2025-07-18 ENCOUNTER — OFFICE VISIT (OUTPATIENT)
Dept: URGENT CARE | Age: 80
End: 2025-07-18
Payer: MEDICARE

## 2025-07-18 ENCOUNTER — HOSPITAL ENCOUNTER (OUTPATIENT)
Dept: PHYSICAL THERAPY | Age: 80
Setting detail: THERAPIES SERIES
Discharge: HOME | End: 2025-07-18
Attending: PAIN MEDICINE
Payer: MEDICARE

## 2025-07-18 VITALS
SYSTOLIC BLOOD PRESSURE: 123 MMHG | BODY MASS INDEX: 23.82 KG/M2 | OXYGEN SATURATION: 97 % | RESPIRATION RATE: 16 BRPM | HEART RATE: 87 BPM | TEMPERATURE: 98.6 F | DIASTOLIC BLOOD PRESSURE: 84 MMHG | HEIGHT: 65 IN | WEIGHT: 143 LBS

## 2025-07-18 DIAGNOSIS — N30.01 ACUTE CYSTITIS WITH HEMATURIA: ICD-10-CM

## 2025-07-18 LAB
POC APPEARANCE, URINE: ABNORMAL
POC BILIRUBIN, URINE: NEGATIVE
POC BLOOD, URINE: ABNORMAL
POC COLOR, URINE: YELLOW
POC GLUCOSE, URINE: NEGATIVE MG/DL
POC KETONES, URINE: NEGATIVE MG/DL
POC LEUKOCYTES, URINE: ABNORMAL
POC NITRITE,URINE: NEGATIVE
POC PH, URINE: 6 PH
POC PROTEIN, URINE: ABNORMAL MG/DL
POC SPECIFIC GRAVITY, URINE: >=1.03
POC UROBILINOGEN, URINE: 0.2 EU/DL

## 2025-07-18 RX ORDER — ASPIRIN 81 MG/1
TABLET ORAL
COMMUNITY

## 2025-07-18 RX ORDER — UBIDECARENONE 100 MG
CAPSULE ORAL
COMMUNITY

## 2025-07-18 RX ORDER — FAMOTIDINE 20 MG/1
20 TABLET, FILM COATED ORAL 2 TIMES DAILY
COMMUNITY
Start: 2024-09-09

## 2025-07-18 RX ORDER — CEPHALEXIN 500 MG/1
CAPSULE ORAL
Qty: 30 CAPSULE | Refills: 0 | Status: SHIPPED | OUTPATIENT
Start: 2025-07-18

## 2025-07-18 RX ORDER — ALPRAZOLAM 0.25 MG/1
TABLET ORAL
COMMUNITY
Start: 2025-07-07 | End: 2025-08-07

## 2025-07-18 RX ORDER — FOLIC ACID 0.8 MG
TABLET ORAL
COMMUNITY

## 2025-07-18 RX ORDER — MELOXICAM 15 MG/1
15 TABLET ORAL
COMMUNITY
Start: 2025-06-13

## 2025-07-18 RX ORDER — ACETAMINOPHEN 500 MG
1000 TABLET ORAL EVERY 8 HOURS PRN
COMMUNITY
Start: 2024-05-14

## 2025-07-18 ASSESSMENT — PAIN SCALES - GENERAL: PAINLEVEL_OUTOF10: 10-WORST PAIN EVER

## 2025-07-18 NOTE — PROGRESS NOTES
[facility]  PHYSICAL THERAPY PLAN OF CARE                                    [facility] [place] [address]     Ph: [phone]  Fax: [phone]    [] Certification  [] Recertification []  Plan of Care  [] Progress Note [x] Discharge      Referring Provider: Malia Ibarra MD    From:  Shy Barrera DPT    Patient: Malka Jones (80 y.o. female) : 1945 Date: 2025   Medical Diagnosis: Cervical spondylosis [M47.812]  Neck stiffness [M43.6]  Parkinson's disease, unspecified whether dyskinesia present, unspecified whether manifestations fluctuate (HCC) [G20.A1]  Myofascial pain [M79.18]    Treatment Diagnosis: neck pain, impaired balance, impaired strength    Plan of Care/Certification Expiration Date: 25   Progress Report Period from:  2025 to 2025    Visits to Date: 14 No Show: 2 Cancelled Appts: 10    OBJECTIVE:   Short Term Goals - Time Frame for Short Term Goals: 4 weeks    Goals Current/Discharge status  Status   Short Term Goal 1: Patient will report </= 3/10 pain in cervical region with turning her head for driving.  STG 1 Current Status:: : pt reports 8/10 with driving in neck   Not Met   Short Term Goal 2: Patient will demonstrate improved upright posture without verbal cues.  STG 2 Current Status:: : Unable to assess d/t pt cx last therapy session   Unable to assess   Short Term Goal 3: Patient will be independent with HEP.  STG 3 Current Status:: : Over the phone pt reported good compliance with HEP and going to start parkinson classes.   Met     Long Term Goals - Time Frame for Long Term Goals : 6 weeks  Goals Current/ Discharge status Status   Long Term Goal 1: Patient will increase cervical AROM >/= 5-10 degrees for improved tolerance with looking with driving. LTG 1 Current Status:: : Cervical: Flex 15 deg, Ext 18 deg, Rt Lateral 11 deg, Lt lateral 20 deg   Partially met   Long Term Goal 2: Patient will increase strength in bilateral

## 2025-07-18 NOTE — PROGRESS NOTES
Therapy                            Cancellation/No-show Note      Date: 2025  Patient: Malka Jones (80 y.o. female)  : 1945  MRN:  [id number]  Referring Physician: Malia Ibarra MD    Medical Diagnosis: Cervical spondylosis [M47.812]  Neck stiffness [M43.6]  Parkinson's disease, unspecified whether dyskinesia present, unspecified whether manifestations fluctuate (HCC) [G20.A1]  Myofascial pain [M79.18]      Visit Information:  Visits to Date 14   No Show/Cancelled Appts: 2 / 10      For today's appointment patient:  [x]  Cancelled  []  Rescheduled appointment  []  No-show   []  Called pt to remind of next appointment     Reason given by patient:  [x]  Patient ill  []  Conflicting appointment  []  No transportation    []  Conflict with work  []  No reason given  []  Other:      [] Pt has future appointments scheduled, no follow up needed  [] Pt requests to be on hold.    Reason:   If > 2 weeks please discuss with therapist.  [] Therapist to call pt for follow up  [] [place] to call to re-schedule      Comments:   Pt is D/C'ing from therapy to continue progress with parkinson classes.     Signature: Electronically signed by MARTHA CARREON PTA on 25 at 8:54 AM EDT

## 2025-07-18 NOTE — PROGRESS NOTES
"Subjective   Patient ID: Nohemy Duboes is a 80 y.o. female who presents for UTI (Urinary frequency and burning x 1 month).  HPI  Female presents for evaluation of dysuria. Patient reports several weeks of increased urinary frequency, urinary incontinence, mild suprapubic discomfort, and change in urine color. Patient denies fever, nausea, vomiting, or other constitutional signs and symptoms. Patient reports similar episodes in the past diagnosed as urinary tract infections. No other complaints.    Review of Systems    Constitutional:  See HPI   Genitourinary: See HPI  Neurologic:  Alert and oriented X4, No numbness, No tingling.    All other systems are negative     Objective     /84 (BP Location: Left arm, Patient Position: Sitting)   Pulse 87   Temp 37 °C (98.6 °F) (Oral)   Resp 16   Ht 1.651 m (5' 5\")   Wt 64.9 kg (143 lb)   SpO2 97%   BMI 23.80 kg/m²     Physical Exam    General:  Alert and oriented, No acute distress.    Eye:  Pupils are equal, round and reactive to light, Normal conjunctiva.    HENT:  Normocephalic,   Neck:  Supple    Respiratory: Respirations are non-labored   Musculoskeletal: Normal ROM and strength  Integumentary:  Warm, Dry, Intact, No pallor, No rash.    Neurologic:  Alert, Oriented, Normal sensory, Cranial Nerves II-XII are grossly intact  Psychiatric:  Cooperative, Appropriate mood & affect.    Assessment/Plan   Urinalysis with blood, leukocyte esterase, and protein consistent with UTI.  Prescription for Keflex.  Urine sent for culture.  Patient's clinical presentation is otherwise unremarkable at this time. Patient is discharged with instructions to follow-up with primary care or seek emergency medical attention for worsening symptoms or any new concerns.  Problem List Items Addressed This Visit    None  Visit Diagnoses         Acute cystitis with hematuria        Relevant Medications    cephalexin (Keflex) 500 mg capsule    Other Relevant Orders    POCT UA Automated " manually resulted (Completed)    Urine Culture            Final diagnoses:   [N30.01] Acute cystitis with hematuria

## 2025-07-20 LAB — BACTERIA UR CULT: ABNORMAL

## 2025-07-21 ENCOUNTER — TELEPHONE (OUTPATIENT)
Dept: URGENT CARE | Age: 80
End: 2025-07-21

## 2025-08-07 ENCOUNTER — APPOINTMENT (OUTPATIENT)
Dept: GENERAL RADIOLOGY | Age: 80
End: 2025-08-07
Payer: MEDICARE

## 2025-08-07 ENCOUNTER — HOSPITAL ENCOUNTER (EMERGENCY)
Age: 80
Discharge: HOME | End: 2025-08-07
Payer: MEDICARE

## 2025-08-07 ENCOUNTER — APPOINTMENT (OUTPATIENT)
Dept: CT IMAGING | Age: 80
End: 2025-08-07
Payer: MEDICARE

## 2025-08-07 VITALS
WEIGHT: 138.45 LBS | SYSTOLIC BLOOD PRESSURE: 126 MMHG | HEIGHT: 63 IN | HEART RATE: 72 BPM | OXYGEN SATURATION: 93 % | RESPIRATION RATE: 20 BRPM | TEMPERATURE: 98.8 F | DIASTOLIC BLOOD PRESSURE: 64 MMHG | BODY MASS INDEX: 24.53 KG/M2

## 2025-08-07 DIAGNOSIS — S70.01XA CONTUSION OF RIGHT HIP, INITIAL ENCOUNTER: ICD-10-CM

## 2025-08-07 DIAGNOSIS — S40.011A CONTUSION OF MULTIPLE SITES OF RIGHT SHOULDER AND UPPER ARM, INITIAL ENCOUNTER: ICD-10-CM

## 2025-08-07 DIAGNOSIS — S09.90XA CLOSED HEAD INJURY, INITIAL ENCOUNTER: Primary | ICD-10-CM

## 2025-08-07 DIAGNOSIS — S40.021A CONTUSION OF MULTIPLE SITES OF RIGHT SHOULDER AND UPPER ARM, INITIAL ENCOUNTER: ICD-10-CM

## 2025-08-07 PROCEDURE — 96374 THER/PROPH/DIAG INJ IV PUSH: CPT

## 2025-08-07 PROCEDURE — 6360000002 HC RX W HCPCS: Performed by: PERSONAL EMERGENCY RESPONSE ATTENDANT

## 2025-08-07 PROCEDURE — 99284 EMERGENCY DEPT VISIT MOD MDM: CPT

## 2025-08-07 PROCEDURE — 73502 X-RAY EXAM HIP UNI 2-3 VIEWS: CPT

## 2025-08-07 PROCEDURE — 70450 CT HEAD/BRAIN W/O DYE: CPT

## 2025-08-07 PROCEDURE — 96375 TX/PRO/DX INJ NEW DRUG ADDON: CPT

## 2025-08-07 PROCEDURE — 73030 X-RAY EXAM OF SHOULDER: CPT

## 2025-08-07 RX ORDER — MORPHINE SULFATE 4 MG/ML
4 INJECTION, SOLUTION INTRAMUSCULAR; INTRAVENOUS ONCE
Status: COMPLETED | OUTPATIENT
Start: 2025-08-07 | End: 2025-08-07

## 2025-08-07 RX ORDER — TRAMADOL HYDROCHLORIDE 50 MG/1
50 TABLET ORAL EVERY 8 HOURS PRN
Qty: 6 TABLET | Refills: 0 | Status: ON HOLD | OUTPATIENT
Start: 2025-08-07 | End: 2025-08-09

## 2025-08-07 RX ORDER — ONDANSETRON 2 MG/ML
4 INJECTION INTRAMUSCULAR; INTRAVENOUS ONCE
Status: COMPLETED | OUTPATIENT
Start: 2025-08-07 | End: 2025-08-07

## 2025-08-07 RX ADMIN — MORPHINE SULFATE 4 MG: 4 INJECTION, SOLUTION INTRAMUSCULAR; INTRAVENOUS at 05:43

## 2025-08-07 RX ADMIN — ONDANSETRON 4 MG: 2 INJECTION, SOLUTION INTRAMUSCULAR; INTRAVENOUS at 05:43

## 2025-08-07 ASSESSMENT — PAIN DESCRIPTION - DESCRIPTORS
DESCRIPTORS: ACHING
DESCRIPTORS_2: ACHING

## 2025-08-07 ASSESSMENT — PAIN DESCRIPTION - INTENSITY
RATING_2: 6
RATING_2: 9

## 2025-08-07 ASSESSMENT — PAIN DESCRIPTION - LOCATION
LOCATION: SHOULDER
LOCATION_2: HIP

## 2025-08-07 ASSESSMENT — PAIN SCALES - GENERAL
PAINLEVEL_OUTOF10: 6
PAINLEVEL_OUTOF10: 9

## 2025-08-07 ASSESSMENT — LIFESTYLE VARIABLES
HOW MANY STANDARD DRINKS CONTAINING ALCOHOL DO YOU HAVE ON A TYPICAL DAY: PATIENT DOES NOT DRINK
HOW OFTEN DO YOU HAVE A DRINK CONTAINING ALCOHOL: NEVER

## 2025-08-07 ASSESSMENT — ENCOUNTER SYMPTOMS
VOMITING: 0
COUGH: 0
RHINORRHEA: 0
SORE THROAT: 0
SHORTNESS OF BREATH: 0
ABDOMINAL PAIN: 0
NAUSEA: 0
DIARRHEA: 0
BLOOD IN STOOL: 0
COLOR CHANGE: 0

## 2025-08-07 ASSESSMENT — PAIN DESCRIPTION - FREQUENCY: FREQUENCY: CONTINUOUS

## 2025-08-07 ASSESSMENT — PAIN DESCRIPTION - ORIENTATION
ORIENTATION_2: RIGHT
ORIENTATION: RIGHT

## 2025-08-07 ASSESSMENT — PAIN DESCRIPTION - PAIN TYPE: TYPE: ACUTE PAIN

## 2025-08-07 ASSESSMENT — PAIN DESCRIPTION - ONSET: ONSET: SUDDEN

## 2025-08-07 ASSESSMENT — PAIN - FUNCTIONAL ASSESSMENT: PAIN_FUNCTIONAL_ASSESSMENT: 0-10

## 2025-08-08 ENCOUNTER — HOSPITAL ENCOUNTER (INPATIENT)
Age: 80
LOS: 2 days | Discharge: HOME | DRG: 057 | End: 2025-08-12
Attending: STUDENT IN AN ORGANIZED HEALTH CARE EDUCATION/TRAINING PROGRAM | Admitting: INTERNAL MEDICINE
Payer: MEDICARE

## 2025-08-08 ENCOUNTER — OFFICE VISIT (OUTPATIENT)
Age: 80
End: 2025-08-08
Payer: MEDICARE

## 2025-08-08 VITALS
TEMPERATURE: 96.7 F | WEIGHT: 138 LBS | SYSTOLIC BLOOD PRESSURE: 118 MMHG | HEIGHT: 63 IN | HEART RATE: 94 BPM | BODY MASS INDEX: 24.45 KG/M2 | OXYGEN SATURATION: 94 % | DIASTOLIC BLOOD PRESSURE: 62 MMHG

## 2025-08-08 DIAGNOSIS — S70.01XA CONTUSION OF RIGHT HIP, INITIAL ENCOUNTER: ICD-10-CM

## 2025-08-08 DIAGNOSIS — R26.81 GAIT INSTABILITY: ICD-10-CM

## 2025-08-08 DIAGNOSIS — R26.2 UNABLE TO AMBULATE: Primary | ICD-10-CM

## 2025-08-08 DIAGNOSIS — S40.021A CONTUSION OF MULTIPLE SITES OF RIGHT SHOULDER AND UPPER ARM, INITIAL ENCOUNTER: ICD-10-CM

## 2025-08-08 DIAGNOSIS — B96.89 ACUTE BACTERIAL SINUSITIS: ICD-10-CM

## 2025-08-08 DIAGNOSIS — M75.81 ROTATOR CUFF TENDINITIS, RIGHT: Primary | ICD-10-CM

## 2025-08-08 DIAGNOSIS — R26.81 UNSTEADY GAIT: ICD-10-CM

## 2025-08-08 DIAGNOSIS — J01.90 ACUTE BACTERIAL SINUSITIS: ICD-10-CM

## 2025-08-08 DIAGNOSIS — G20.A2 PARKINSON'S DISEASE WITHOUT DYSKINESIA, WITH FLUCTUATING MANIFESTATIONS (HCC): ICD-10-CM

## 2025-08-08 DIAGNOSIS — M50.30 DDD (DEGENERATIVE DISC DISEASE), CERVICAL: ICD-10-CM

## 2025-08-08 DIAGNOSIS — R29.6 FREQUENT FALLS: ICD-10-CM

## 2025-08-08 DIAGNOSIS — R26.89 IMBALANCE: ICD-10-CM

## 2025-08-08 DIAGNOSIS — S40.011A CONTUSION OF MULTIPLE SITES OF RIGHT SHOULDER AND UPPER ARM, INITIAL ENCOUNTER: ICD-10-CM

## 2025-08-08 DIAGNOSIS — R53.1 WEAKNESS GENERALIZED: ICD-10-CM

## 2025-08-08 LAB
ANION GAP SERPL CALCULATED.3IONS-SCNC: 10 MEQ/L (ref 9–15)
BASOPHILS # BLD: 0.1 K/UL (ref 0–0.2)
BASOPHILS NFR BLD: 0.9 %
BUN SERPL-MCNC: 14 MG/DL (ref 8–23)
CALCIUM SERPL-MCNC: 9 MG/DL (ref 8.5–9.9)
CHLORIDE SERPL-SCNC: 102 MEQ/L (ref 95–107)
CO2 SERPL-SCNC: 27 MEQ/L (ref 20–31)
CREAT SERPL-MCNC: 0.77 MG/DL (ref 0.5–0.9)
EOSINOPHIL # BLD: 0.1 K/UL (ref 0–0.7)
EOSINOPHIL NFR BLD: 0.7 %
ERYTHROCYTE [DISTWIDTH] IN BLOOD BY AUTOMATED COUNT: 13.6 % (ref 11.5–14.5)
GLUCOSE SERPL-MCNC: 99 MG/DL (ref 70–99)
HCT VFR BLD AUTO: 38.8 % (ref 37–47)
HGB BLD-MCNC: 12.5 G/DL (ref 12–16)
LYMPHOCYTES # BLD: 1.8 K/UL (ref 1–4.8)
LYMPHOCYTES NFR BLD: 26.3 %
MAGNESIUM SERPL-MCNC: 2.2 MG/DL (ref 1.7–2.4)
MCH RBC QN AUTO: 30.5 PG (ref 27–31.3)
MCHC RBC AUTO-ENTMCNC: 32.2 % (ref 33–37)
MCV RBC AUTO: 94.6 FL (ref 79.4–94.8)
MONOCYTES # BLD: 0.6 K/UL (ref 0.2–0.8)
MONOCYTES NFR BLD: 9.3 %
NEUTROPHILS # BLD: 4.2 K/UL (ref 1.4–6.5)
NEUTS SEG NFR BLD: 62.2 %
PLATELET # BLD AUTO: 200 K/UL (ref 130–400)
POTASSIUM SERPL-SCNC: 3.6 MEQ/L (ref 3.4–4.9)
RBC # BLD AUTO: 4.1 M/UL (ref 4.2–5.4)
SODIUM SERPL-SCNC: 139 MEQ/L (ref 135–144)
TROPONIN, HIGH SENSITIVITY: 30 NG/L (ref 0–19)
TSH SERPL-MCNC: 1.12 UIU/ML (ref 0.44–3.86)
WBC # BLD AUTO: 6.8 K/UL (ref 4.8–10.8)

## 2025-08-08 PROCEDURE — 85025 COMPLETE CBC W/AUTO DIFF WBC: CPT

## 2025-08-08 PROCEDURE — 80048 BASIC METABOLIC PNL TOTAL CA: CPT

## 2025-08-08 PROCEDURE — 6360000002 HC RX W HCPCS: Performed by: INTERNAL MEDICINE

## 2025-08-08 PROCEDURE — G8399 PT W/DXA RESULTS DOCUMENT: HCPCS | Performed by: PHYSICIAN ASSISTANT

## 2025-08-08 PROCEDURE — 84484 ASSAY OF TROPONIN QUANT: CPT

## 2025-08-08 PROCEDURE — 96372 THER/PROPH/DIAG INJ SC/IM: CPT

## 2025-08-08 PROCEDURE — G0378 HOSPITAL OBSERVATION PER HR: HCPCS

## 2025-08-08 PROCEDURE — 1090F PRES/ABSN URINE INCON ASSESS: CPT | Performed by: PHYSICIAN ASSISTANT

## 2025-08-08 PROCEDURE — 3078F DIAST BP <80 MM HG: CPT | Performed by: PHYSICIAN ASSISTANT

## 2025-08-08 PROCEDURE — 1159F MED LIST DOCD IN RCRD: CPT | Performed by: PHYSICIAN ASSISTANT

## 2025-08-08 PROCEDURE — 99214 OFFICE O/P EST MOD 30 MIN: CPT | Performed by: PHYSICIAN ASSISTANT

## 2025-08-08 PROCEDURE — 1123F ACP DISCUSS/DSCN MKR DOCD: CPT | Performed by: PHYSICIAN ASSISTANT

## 2025-08-08 PROCEDURE — 93005 ELECTROCARDIOGRAM TRACING: CPT | Performed by: STUDENT IN AN ORGANIZED HEALTH CARE EDUCATION/TRAINING PROGRAM

## 2025-08-08 PROCEDURE — G8420 CALC BMI NORM PARAMETERS: HCPCS | Performed by: PHYSICIAN ASSISTANT

## 2025-08-08 PROCEDURE — 1125F AMNT PAIN NOTED PAIN PRSNT: CPT | Performed by: PHYSICIAN ASSISTANT

## 2025-08-08 PROCEDURE — 99285 EMERGENCY DEPT VISIT HI MDM: CPT

## 2025-08-08 PROCEDURE — 84443 ASSAY THYROID STIM HORMONE: CPT

## 2025-08-08 PROCEDURE — 1036F TOBACCO NON-USER: CPT | Performed by: PHYSICIAN ASSISTANT

## 2025-08-08 PROCEDURE — G8427 DOCREV CUR MEDS BY ELIG CLIN: HCPCS | Performed by: PHYSICIAN ASSISTANT

## 2025-08-08 PROCEDURE — 6370000000 HC RX 637 (ALT 250 FOR IP): Performed by: INTERNAL MEDICINE

## 2025-08-08 PROCEDURE — 83735 ASSAY OF MAGNESIUM: CPT

## 2025-08-08 PROCEDURE — 2500000003 HC RX 250 WO HCPCS: Performed by: INTERNAL MEDICINE

## 2025-08-08 PROCEDURE — 36415 COLL VENOUS BLD VENIPUNCTURE: CPT

## 2025-08-08 PROCEDURE — 3074F SYST BP LT 130 MM HG: CPT | Performed by: PHYSICIAN ASSISTANT

## 2025-08-08 RX ORDER — ACETAMINOPHEN 325 MG/1
650 TABLET ORAL EVERY 6 HOURS PRN
Status: DISCONTINUED | OUTPATIENT
Start: 2025-08-08 | End: 2025-08-12 | Stop reason: HOSPADM

## 2025-08-08 RX ORDER — FAMOTIDINE 20 MG/1
20 TABLET, FILM COATED ORAL 2 TIMES DAILY PRN
Status: DISCONTINUED | OUTPATIENT
Start: 2025-08-08 | End: 2025-08-12 | Stop reason: HOSPADM

## 2025-08-08 RX ORDER — POTASSIUM CHLORIDE 1500 MG/1
40 TABLET, EXTENDED RELEASE ORAL PRN
Status: DISCONTINUED | OUTPATIENT
Start: 2025-08-08 | End: 2025-08-12 | Stop reason: HOSPADM

## 2025-08-08 RX ORDER — PRAMIPEXOLE DIHYDROCHLORIDE 0.12 MG/1
0.12 TABLET ORAL 3 TIMES DAILY
Status: DISCONTINUED | OUTPATIENT
Start: 2025-08-08 | End: 2025-08-12 | Stop reason: HOSPADM

## 2025-08-08 RX ORDER — OXYCODONE HYDROCHLORIDE 5 MG/1
5 TABLET ORAL EVERY 4 HOURS PRN
Refills: 0 | Status: DISCONTINUED | OUTPATIENT
Start: 2025-08-08 | End: 2025-08-12 | Stop reason: HOSPADM

## 2025-08-08 RX ORDER — ONDANSETRON 2 MG/ML
4 INJECTION INTRAMUSCULAR; INTRAVENOUS EVERY 6 HOURS PRN
Status: DISCONTINUED | OUTPATIENT
Start: 2025-08-08 | End: 2025-08-12 | Stop reason: HOSPADM

## 2025-08-08 RX ORDER — POTASSIUM CHLORIDE 7.45 MG/ML
10 INJECTION INTRAVENOUS PRN
Status: DISCONTINUED | OUTPATIENT
Start: 2025-08-08 | End: 2025-08-12 | Stop reason: HOSPADM

## 2025-08-08 RX ORDER — ONDANSETRON 4 MG/1
4 TABLET, ORALLY DISINTEGRATING ORAL EVERY 8 HOURS PRN
Status: DISCONTINUED | OUTPATIENT
Start: 2025-08-08 | End: 2025-08-12 | Stop reason: HOSPADM

## 2025-08-08 RX ORDER — ASPIRIN 81 MG/1
81 TABLET ORAL DAILY
Status: DISCONTINUED | OUTPATIENT
Start: 2025-08-08 | End: 2025-08-12 | Stop reason: HOSPADM

## 2025-08-08 RX ORDER — POLYETHYLENE GLYCOL 3350 17 G/17G
17 POWDER, FOR SOLUTION ORAL DAILY PRN
Status: DISCONTINUED | OUTPATIENT
Start: 2025-08-08 | End: 2025-08-12 | Stop reason: HOSPADM

## 2025-08-08 RX ORDER — ACETAMINOPHEN 650 MG/1
650 SUPPOSITORY RECTAL EVERY 6 HOURS PRN
Status: DISCONTINUED | OUTPATIENT
Start: 2025-08-08 | End: 2025-08-12 | Stop reason: HOSPADM

## 2025-08-08 RX ORDER — MELOXICAM 7.5 MG/1
15 TABLET ORAL DAILY
Status: DISCONTINUED | OUTPATIENT
Start: 2025-08-08 | End: 2025-08-12 | Stop reason: HOSPADM

## 2025-08-08 RX ORDER — SODIUM CHLORIDE 0.9 % (FLUSH) 0.9 %
5-40 SYRINGE (ML) INJECTION EVERY 12 HOURS SCHEDULED
Status: DISCONTINUED | OUTPATIENT
Start: 2025-08-08 | End: 2025-08-12 | Stop reason: HOSPADM

## 2025-08-08 RX ORDER — ALPRAZOLAM 0.25 MG
0.25 TABLET ORAL
Status: DISCONTINUED | OUTPATIENT
Start: 2025-08-08 | End: 2025-08-12 | Stop reason: HOSPADM

## 2025-08-08 RX ORDER — SODIUM CHLORIDE 0.9 % (FLUSH) 0.9 %
5-40 SYRINGE (ML) INJECTION PRN
Status: DISCONTINUED | OUTPATIENT
Start: 2025-08-08 | End: 2025-08-12 | Stop reason: HOSPADM

## 2025-08-08 RX ORDER — TRIAMCINOLONE ACETONIDE 40 MG/ML
40 INJECTION, SUSPENSION INTRA-ARTICULAR; INTRAMUSCULAR ONCE
Status: DISCONTINUED | OUTPATIENT
Start: 2025-08-08 | End: 2025-08-08

## 2025-08-08 RX ORDER — MAGNESIUM SULFATE IN WATER 40 MG/ML
2000 INJECTION, SOLUTION INTRAVENOUS PRN
Status: DISCONTINUED | OUTPATIENT
Start: 2025-08-08 | End: 2025-08-12 | Stop reason: HOSPADM

## 2025-08-08 RX ORDER — METOPROLOL SUCCINATE 25 MG/1
25 TABLET, EXTENDED RELEASE ORAL DAILY
Status: DISCONTINUED | OUTPATIENT
Start: 2025-08-08 | End: 2025-08-12 | Stop reason: HOSPADM

## 2025-08-08 RX ORDER — ENOXAPARIN SODIUM 100 MG/ML
40 INJECTION SUBCUTANEOUS DAILY
Status: DISCONTINUED | OUTPATIENT
Start: 2025-08-08 | End: 2025-08-12 | Stop reason: HOSPADM

## 2025-08-08 RX ORDER — LIDOCAINE HYDROCHLORIDE 10 MG/ML
10 INJECTION, SOLUTION INFILTRATION; PERINEURAL ONCE
Status: DISCONTINUED | OUTPATIENT
Start: 2025-08-08 | End: 2025-08-08

## 2025-08-08 RX ORDER — ETHYL CHLORIDE 100 %
AEROSOL, SPRAY (ML) TOPICAL ONCE
Status: DISCONTINUED | OUTPATIENT
Start: 2025-08-08 | End: 2025-08-12 | Stop reason: HOSPADM

## 2025-08-08 RX ORDER — ALPRAZOLAM 0.25 MG
0.25 TABLET ORAL
Status: ON HOLD | COMMUNITY
Start: 2025-07-07 | End: 2025-08-09

## 2025-08-08 RX ORDER — SODIUM CHLORIDE 9 MG/ML
INJECTION, SOLUTION INTRAVENOUS PRN
Status: DISCONTINUED | OUTPATIENT
Start: 2025-08-08 | End: 2025-08-12 | Stop reason: HOSPADM

## 2025-08-08 RX ADMIN — MELOXICAM 15 MG: 7.5 TABLET ORAL at 20:20

## 2025-08-08 RX ADMIN — METOPROLOL SUCCINATE 25 MG: 25 TABLET, EXTENDED RELEASE ORAL at 20:21

## 2025-08-08 RX ADMIN — SODIUM CHLORIDE, PRESERVATIVE FREE 10 ML: 5 INJECTION INTRAVENOUS at 20:14

## 2025-08-08 RX ADMIN — SODIUM CHLORIDE, PRESERVATIVE FREE 10 ML: 5 INJECTION INTRAVENOUS at 20:15

## 2025-08-08 RX ADMIN — PRAMIPEXOLE DIHYDROCHLORIDE 0.12 MG: 0.12 TABLET ORAL at 20:21

## 2025-08-08 RX ADMIN — OXYCODONE 5 MG: 5 TABLET ORAL at 17:18

## 2025-08-08 RX ADMIN — MICONAZOLE NITRATE: 20 POWDER TOPICAL at 20:30

## 2025-08-08 RX ADMIN — ENOXAPARIN SODIUM 40 MG: 100 INJECTION SUBCUTANEOUS at 15:52

## 2025-08-08 ASSESSMENT — PAIN - FUNCTIONAL ASSESSMENT
PAIN_FUNCTIONAL_ASSESSMENT: 0-10
PAIN_FUNCTIONAL_ASSESSMENT: PREVENTS OR INTERFERES WITH MANY ACTIVE NOT PASSIVE ACTIVITIES

## 2025-08-08 ASSESSMENT — PAIN DESCRIPTION - LOCATION
LOCATION: HIP;SHOULDER
LOCATION: SHOULDER
LOCATION: SHOULDER;HIP

## 2025-08-08 ASSESSMENT — PAIN SCALES - GENERAL
PAINLEVEL_OUTOF10: 8
PAINLEVEL_OUTOF10: 5
PAINLEVEL_OUTOF10: 8

## 2025-08-08 ASSESSMENT — ENCOUNTER SYMPTOMS
GASTROINTESTINAL NEGATIVE: 1
RESPIRATORY NEGATIVE: 1
EYES NEGATIVE: 1

## 2025-08-08 ASSESSMENT — PAIN DESCRIPTION - DESCRIPTORS
DESCRIPTORS: ACHING;SORE
DESCRIPTORS: ACHING;THROBBING

## 2025-08-08 ASSESSMENT — PAIN DESCRIPTION - ORIENTATION
ORIENTATION: RIGHT
ORIENTATION: RIGHT;LEFT
ORIENTATION: RIGHT

## 2025-08-08 ASSESSMENT — PAIN DESCRIPTION - ONSET: ONSET: ON-GOING

## 2025-08-08 ASSESSMENT — PAIN DESCRIPTION - PAIN TYPE: TYPE: ACUTE PAIN

## 2025-08-08 ASSESSMENT — PAIN DESCRIPTION - FREQUENCY: FREQUENCY: CONTINUOUS

## 2025-08-09 PROBLEM — R26.2 UNABLE TO AMBULATE: Status: ACTIVE | Noted: 2025-08-09

## 2025-08-09 LAB
BASOPHILS # BLD: 0 K/UL (ref 0–0.2)
BASOPHILS NFR BLD: 0.8 %
EOSINOPHIL # BLD: 0.1 K/UL (ref 0–0.7)
EOSINOPHIL NFR BLD: 2.3 %
ERYTHROCYTE [DISTWIDTH] IN BLOOD BY AUTOMATED COUNT: 13.5 % (ref 11.5–14.5)
HCT VFR BLD AUTO: 36 % (ref 37–47)
HGB BLD-MCNC: 11.6 G/DL (ref 12–16)
LYMPHOCYTES # BLD: 1.4 K/UL (ref 1–4.8)
LYMPHOCYTES NFR BLD: 27.4 %
MCH RBC QN AUTO: 30 PG (ref 27–31.3)
MCHC RBC AUTO-ENTMCNC: 32.2 % (ref 33–37)
MCV RBC AUTO: 93 FL (ref 79.4–94.8)
MONOCYTES # BLD: 0.5 K/UL (ref 0.2–0.8)
MONOCYTES NFR BLD: 9.2 %
NEUTROPHILS # BLD: 3.1 K/UL (ref 1.4–6.5)
NEUTS SEG NFR BLD: 59.9 %
PLATELET # BLD AUTO: 183 K/UL (ref 130–400)
RBC # BLD AUTO: 3.87 M/UL (ref 4.2–5.4)
WBC # BLD AUTO: 5.2 K/UL (ref 4.8–10.8)

## 2025-08-09 PROCEDURE — 6360000002 HC RX W HCPCS: Performed by: INTERNAL MEDICINE

## 2025-08-09 PROCEDURE — 2500000003 HC RX 250 WO HCPCS: Performed by: INTERNAL MEDICINE

## 2025-08-09 PROCEDURE — 96372 THER/PROPH/DIAG INJ SC/IM: CPT

## 2025-08-09 PROCEDURE — 6370000000 HC RX 637 (ALT 250 FOR IP): Performed by: INTERNAL MEDICINE

## 2025-08-09 PROCEDURE — 97162 PT EVAL MOD COMPLEX 30 MIN: CPT

## 2025-08-09 PROCEDURE — 85025 COMPLETE CBC W/AUTO DIFF WBC: CPT

## 2025-08-09 PROCEDURE — G0378 HOSPITAL OBSERVATION PER HR: HCPCS

## 2025-08-09 PROCEDURE — 96374 THER/PROPH/DIAG INJ IV PUSH: CPT

## 2025-08-09 PROCEDURE — 99222 1ST HOSP IP/OBS MODERATE 55: CPT | Performed by: PSYCHIATRY & NEUROLOGY

## 2025-08-09 PROCEDURE — 36415 COLL VENOUS BLD VENIPUNCTURE: CPT

## 2025-08-09 RX ORDER — DOCUSATE SODIUM 100 MG/1
100 CAPSULE, LIQUID FILLED ORAL DAILY
Status: DISCONTINUED | OUTPATIENT
Start: 2025-08-09 | End: 2025-08-12 | Stop reason: HOSPADM

## 2025-08-09 RX ORDER — TRAMADOL HYDROCHLORIDE 50 MG/1
50 TABLET ORAL EVERY 8 HOURS PRN
Qty: 6 TABLET | Refills: 0 | Status: SHIPPED | OUTPATIENT
Start: 2025-08-09 | End: 2025-08-11

## 2025-08-09 RX ORDER — ALPRAZOLAM 0.25 MG
0.25 TABLET ORAL
Qty: 3 TABLET | Refills: 0 | Status: SHIPPED | OUTPATIENT
Start: 2025-08-09 | End: 2025-08-12

## 2025-08-09 RX ADMIN — SODIUM CHLORIDE, PRESERVATIVE FREE 10 ML: 5 INJECTION INTRAVENOUS at 13:31

## 2025-08-09 RX ADMIN — PRAMIPEXOLE DIHYDROCHLORIDE 0.12 MG: 0.12 TABLET ORAL at 10:14

## 2025-08-09 RX ADMIN — DOCUSATE SODIUM 100 MG: 100 CAPSULE, LIQUID FILLED ORAL at 13:30

## 2025-08-09 RX ADMIN — MICONAZOLE NITRATE: 20 POWDER TOPICAL at 10:20

## 2025-08-09 RX ADMIN — METOPROLOL SUCCINATE 25 MG: 25 TABLET, EXTENDED RELEASE ORAL at 10:14

## 2025-08-09 RX ADMIN — MICONAZOLE NITRATE: 20 POWDER TOPICAL at 20:17

## 2025-08-09 RX ADMIN — ENOXAPARIN SODIUM 40 MG: 100 INJECTION SUBCUTANEOUS at 10:17

## 2025-08-09 RX ADMIN — ASPIRIN 81 MG: 81 TABLET, DELAYED RELEASE ORAL at 10:14

## 2025-08-09 RX ADMIN — PRAMIPEXOLE DIHYDROCHLORIDE 0.12 MG: 0.12 TABLET ORAL at 20:14

## 2025-08-09 RX ADMIN — OXYCODONE 5 MG: 5 TABLET ORAL at 10:14

## 2025-08-09 RX ADMIN — OXYCODONE 5 MG: 5 TABLET ORAL at 04:04

## 2025-08-09 RX ADMIN — PRAMIPEXOLE DIHYDROCHLORIDE 0.12 MG: 0.12 TABLET ORAL at 13:30

## 2025-08-09 RX ADMIN — ONDANSETRON 4 MG: 2 INJECTION, SOLUTION INTRAMUSCULAR; INTRAVENOUS at 17:03

## 2025-08-09 RX ADMIN — MELOXICAM 15 MG: 7.5 TABLET ORAL at 10:14

## 2025-08-09 ASSESSMENT — PAIN SCALES - GENERAL
PAINLEVEL_OUTOF10: 0
PAINLEVEL_OUTOF10: 6
PAINLEVEL_OUTOF10: 2
PAINLEVEL_OUTOF10: 8
PAINLEVEL_OUTOF10: 5

## 2025-08-09 ASSESSMENT — PAIN - FUNCTIONAL ASSESSMENT
PAIN_FUNCTIONAL_ASSESSMENT: 0-10

## 2025-08-09 ASSESSMENT — PAIN DESCRIPTION - DESCRIPTORS: DESCRIPTORS: SHARP

## 2025-08-09 ASSESSMENT — PAIN DESCRIPTION - ORIENTATION: ORIENTATION: RIGHT

## 2025-08-09 ASSESSMENT — PAIN DESCRIPTION - LOCATION: LOCATION: SHOULDER

## 2025-08-10 LAB
BACTERIA URNS QL MICRO: ABNORMAL /HPF
BILIRUB UR QL STRIP: NEGATIVE
CLARITY UR: ABNORMAL
COLOR UR: YELLOW
EPI CELLS #/AREA URNS AUTO: ABNORMAL /HPF (ref 0–5)
GLUCOSE UR STRIP-MCNC: NEGATIVE MG/DL
HGB UR QL STRIP: ABNORMAL
HYALINE CASTS #/AREA URNS AUTO: ABNORMAL /HPF (ref 0–5)
KETONES UR STRIP-MCNC: NEGATIVE MG/DL
LEUKOCYTE ESTERASE UR QL STRIP: ABNORMAL
NITRITE UR QL STRIP: POSITIVE
PH UR STRIP: 7.5 [PH] (ref 5–9)
PROT UR STRIP-MCNC: 30 MG/DL
RBC #/AREA URNS AUTO: ABNORMAL /HPF (ref 0–5)
SP GR UR STRIP: 1.01 (ref 1–1.03)
URINE REFLEX TO CULTURE: YES
UROBILINOGEN UR STRIP-ACNC: 2 E.U./DL
WBC #/AREA URNS AUTO: >100 /HPF (ref 0–5)
YEAST URNS QL MICRO: PRESENT /HPF

## 2025-08-10 PROCEDURE — 87088 URINE BACTERIA CULTURE: CPT

## 2025-08-10 PROCEDURE — 96375 TX/PRO/DX INJ NEW DRUG ADDON: CPT

## 2025-08-10 PROCEDURE — 99232 SBSQ HOSP IP/OBS MODERATE 35: CPT | Performed by: PSYCHIATRY & NEUROLOGY

## 2025-08-10 PROCEDURE — 81001 URINALYSIS AUTO W/SCOPE: CPT

## 2025-08-10 PROCEDURE — 87086 URINE CULTURE/COLONY COUNT: CPT

## 2025-08-10 PROCEDURE — 6360000002 HC RX W HCPCS: Performed by: INTERNAL MEDICINE

## 2025-08-10 PROCEDURE — 2500000003 HC RX 250 WO HCPCS: Performed by: INTERNAL MEDICINE

## 2025-08-10 PROCEDURE — 1210000000 HC MED SURG R&B

## 2025-08-10 PROCEDURE — 6370000000 HC RX 637 (ALT 250 FOR IP): Performed by: INTERNAL MEDICINE

## 2025-08-10 PROCEDURE — 87186 SC STD MICRODIL/AGAR DIL: CPT

## 2025-08-10 PROCEDURE — 96372 THER/PROPH/DIAG INJ SC/IM: CPT

## 2025-08-10 RX ADMIN — MELOXICAM 15 MG: 7.5 TABLET ORAL at 08:45

## 2025-08-10 RX ADMIN — ENOXAPARIN SODIUM 40 MG: 100 INJECTION SUBCUTANEOUS at 08:45

## 2025-08-10 RX ADMIN — PRAMIPEXOLE DIHYDROCHLORIDE 0.12 MG: 0.12 TABLET ORAL at 08:45

## 2025-08-10 RX ADMIN — MICONAZOLE NITRATE: 20 POWDER TOPICAL at 20:30

## 2025-08-10 RX ADMIN — SODIUM CHLORIDE, PRESERVATIVE FREE 10 ML: 5 INJECTION INTRAVENOUS at 08:46

## 2025-08-10 RX ADMIN — METOPROLOL SUCCINATE 25 MG: 25 TABLET, EXTENDED RELEASE ORAL at 08:45

## 2025-08-10 RX ADMIN — PRAMIPEXOLE DIHYDROCHLORIDE 0.12 MG: 0.12 TABLET ORAL at 20:30

## 2025-08-10 RX ADMIN — WATER 1000 MG: 1 INJECTION INTRAMUSCULAR; INTRAVENOUS; SUBCUTANEOUS at 10:56

## 2025-08-10 RX ADMIN — ASPIRIN 81 MG: 81 TABLET, DELAYED RELEASE ORAL at 08:45

## 2025-08-10 RX ADMIN — PRAMIPEXOLE DIHYDROCHLORIDE 0.12 MG: 0.12 TABLET ORAL at 13:29

## 2025-08-10 RX ADMIN — DOCUSATE SODIUM 100 MG: 100 CAPSULE, LIQUID FILLED ORAL at 08:45

## 2025-08-10 RX ADMIN — MICONAZOLE NITRATE: 20 POWDER TOPICAL at 09:54

## 2025-08-10 ASSESSMENT — PAIN SCALES - GENERAL
PAINLEVEL_OUTOF10: 0
PAINLEVEL_OUTOF10: 0

## 2025-08-11 ENCOUNTER — APPOINTMENT (OUTPATIENT)
Dept: CT IMAGING | Age: 80
DRG: 057 | End: 2025-08-11
Payer: MEDICARE

## 2025-08-11 LAB
CK SERPL-CCNC: 130 U/L (ref 0–170)
EKG ATRIAL RATE: 73 BPM
EKG DIAGNOSIS: NORMAL
EKG P AXIS: 39 DEGREES
EKG P-R INTERVAL: 170 MS
EKG Q-T INTERVAL: 384 MS
EKG QRS DURATION: 78 MS
EKG QTC CALCULATION (BAZETT): 423 MS
EKG R AXIS: -9 DEGREES
EKG T AXIS: 1 DEGREES
EKG VENTRICULAR RATE: 73 BPM
FOLATE: 12.3 NG/ML (ref 4.8–24.2)
VITAMIN B-12: 647 PG/ML (ref 232–1245)
VITAMIN D 25-HYDROXY: 37.3 NG/ML (ref 30–100)

## 2025-08-11 PROCEDURE — 82607 VITAMIN B-12: CPT

## 2025-08-11 PROCEDURE — 72131 CT LUMBAR SPINE W/O DYE: CPT

## 2025-08-11 PROCEDURE — 6360000002 HC RX W HCPCS: Performed by: INTERNAL MEDICINE

## 2025-08-11 PROCEDURE — APPSS30 APP SPLIT SHARED TIME 16-30 MINUTES: Performed by: NURSE PRACTITIONER

## 2025-08-11 PROCEDURE — 99232 SBSQ HOSP IP/OBS MODERATE 35: CPT | Performed by: PSYCHIATRY & NEUROLOGY

## 2025-08-11 PROCEDURE — 82306 VITAMIN D 25 HYDROXY: CPT

## 2025-08-11 PROCEDURE — 6370000000 HC RX 637 (ALT 250 FOR IP): Performed by: INTERNAL MEDICINE

## 2025-08-11 PROCEDURE — 2500000003 HC RX 250 WO HCPCS: Performed by: INTERNAL MEDICINE

## 2025-08-11 PROCEDURE — 36415 COLL VENOUS BLD VENIPUNCTURE: CPT

## 2025-08-11 PROCEDURE — 1210000000 HC MED SURG R&B

## 2025-08-11 PROCEDURE — 82550 ASSAY OF CK (CPK): CPT

## 2025-08-11 PROCEDURE — 97165 OT EVAL LOW COMPLEX 30 MIN: CPT

## 2025-08-11 PROCEDURE — 97116 GAIT TRAINING THERAPY: CPT

## 2025-08-11 PROCEDURE — 82746 ASSAY OF FOLIC ACID SERUM: CPT

## 2025-08-11 RX ADMIN — SODIUM CHLORIDE, PRESERVATIVE FREE 10 ML: 5 INJECTION INTRAVENOUS at 20:25

## 2025-08-11 RX ADMIN — MICONAZOLE NITRATE: 20 POWDER TOPICAL at 09:02

## 2025-08-11 RX ADMIN — METOPROLOL SUCCINATE 25 MG: 25 TABLET, EXTENDED RELEASE ORAL at 09:03

## 2025-08-11 RX ADMIN — PRAMIPEXOLE DIHYDROCHLORIDE 0.12 MG: 0.12 TABLET ORAL at 13:39

## 2025-08-11 RX ADMIN — WATER 1000 MG: 1 INJECTION INTRAMUSCULAR; INTRAVENOUS; SUBCUTANEOUS at 10:39

## 2025-08-11 RX ADMIN — MELOXICAM 15 MG: 7.5 TABLET ORAL at 09:08

## 2025-08-11 RX ADMIN — SODIUM CHLORIDE, PRESERVATIVE FREE 10 ML: 5 INJECTION INTRAVENOUS at 09:09

## 2025-08-11 RX ADMIN — PRAMIPEXOLE DIHYDROCHLORIDE 0.12 MG: 0.12 TABLET ORAL at 09:02

## 2025-08-11 RX ADMIN — ENOXAPARIN SODIUM 40 MG: 100 INJECTION SUBCUTANEOUS at 09:09

## 2025-08-11 RX ADMIN — ASPIRIN 81 MG: 81 TABLET, DELAYED RELEASE ORAL at 09:08

## 2025-08-11 RX ADMIN — MICONAZOLE NITRATE: 20 POWDER TOPICAL at 20:25

## 2025-08-11 RX ADMIN — PRAMIPEXOLE DIHYDROCHLORIDE 0.12 MG: 0.12 TABLET ORAL at 20:24

## 2025-08-11 RX ADMIN — DOCUSATE SODIUM 100 MG: 100 CAPSULE, LIQUID FILLED ORAL at 09:02

## 2025-08-11 ASSESSMENT — ENCOUNTER SYMPTOMS
CHOKING: 0
SHORTNESS OF BREATH: 0
BACK PAIN: 0
VOMITING: 0
NAUSEA: 0
TROUBLE SWALLOWING: 0
COLOR CHANGE: 0
PHOTOPHOBIA: 0

## 2025-08-11 ASSESSMENT — PAIN SCALES - GENERAL
PAINLEVEL_OUTOF10: 0
PAINLEVEL_OUTOF10: 0

## 2025-08-11 ASSESSMENT — PAIN - FUNCTIONAL ASSESSMENT: PAIN_FUNCTIONAL_ASSESSMENT: 0-10

## 2025-08-12 VITALS
RESPIRATION RATE: 18 BRPM | WEIGHT: 150.13 LBS | HEART RATE: 76 BPM | DIASTOLIC BLOOD PRESSURE: 79 MMHG | TEMPERATURE: 98.3 F | HEIGHT: 65 IN | BODY MASS INDEX: 25.01 KG/M2 | SYSTOLIC BLOOD PRESSURE: 150 MMHG | OXYGEN SATURATION: 98 %

## 2025-08-12 LAB
BACTERIA UR CULT: ABNORMAL
BACTERIA UR CULT: ABNORMAL
ORGANISM: ABNORMAL

## 2025-08-12 PROCEDURE — 97116 GAIT TRAINING THERAPY: CPT

## 2025-08-12 PROCEDURE — 99231 SBSQ HOSP IP/OBS SF/LOW 25: CPT | Performed by: PSYCHIATRY & NEUROLOGY

## 2025-08-12 PROCEDURE — 97535 SELF CARE MNGMENT TRAINING: CPT

## 2025-08-12 PROCEDURE — APPSS15 APP SPLIT SHARED TIME 0-15 MINUTES: Performed by: NURSE PRACTITIONER

## 2025-08-12 PROCEDURE — 2500000003 HC RX 250 WO HCPCS: Performed by: INTERNAL MEDICINE

## 2025-08-12 PROCEDURE — 6360000002 HC RX W HCPCS: Performed by: INTERNAL MEDICINE

## 2025-08-12 PROCEDURE — 6370000000 HC RX 637 (ALT 250 FOR IP): Performed by: INTERNAL MEDICINE

## 2025-08-12 RX ADMIN — SODIUM CHLORIDE, PRESERVATIVE FREE 10 ML: 5 INJECTION INTRAVENOUS at 09:00

## 2025-08-12 RX ADMIN — ENOXAPARIN SODIUM 40 MG: 100 INJECTION SUBCUTANEOUS at 08:59

## 2025-08-12 RX ADMIN — PRAMIPEXOLE DIHYDROCHLORIDE 0.12 MG: 0.12 TABLET ORAL at 08:59

## 2025-08-12 RX ADMIN — WATER 1000 MG: 1 INJECTION INTRAMUSCULAR; INTRAVENOUS; SUBCUTANEOUS at 10:25

## 2025-08-12 RX ADMIN — MELOXICAM 15 MG: 7.5 TABLET ORAL at 08:59

## 2025-08-12 RX ADMIN — OXYCODONE 5 MG: 5 TABLET ORAL at 05:05

## 2025-08-12 RX ADMIN — DOCUSATE SODIUM 100 MG: 100 CAPSULE, LIQUID FILLED ORAL at 08:59

## 2025-08-12 RX ADMIN — MICONAZOLE NITRATE: 20 POWDER TOPICAL at 09:01

## 2025-08-12 RX ADMIN — METOPROLOL SUCCINATE 25 MG: 25 TABLET, EXTENDED RELEASE ORAL at 08:59

## 2025-08-12 RX ADMIN — ASPIRIN 81 MG: 81 TABLET, DELAYED RELEASE ORAL at 08:59

## 2025-08-12 RX ADMIN — PRAMIPEXOLE DIHYDROCHLORIDE 0.12 MG: 0.12 TABLET ORAL at 14:08

## 2025-08-12 ASSESSMENT — PAIN - FUNCTIONAL ASSESSMENT
PAIN_FUNCTIONAL_ASSESSMENT: 0-10
PAIN_FUNCTIONAL_ASSESSMENT: 0-10
PAIN_FUNCTIONAL_ASSESSMENT: ACTIVITIES ARE NOT PREVENTED

## 2025-08-12 ASSESSMENT — ENCOUNTER SYMPTOMS
VOMITING: 0
SHORTNESS OF BREATH: 0
TROUBLE SWALLOWING: 0
PHOTOPHOBIA: 0
COLOR CHANGE: 0
BACK PAIN: 0
NAUSEA: 0
CHOKING: 0

## 2025-08-12 ASSESSMENT — PAIN SCALES - GENERAL
PAINLEVEL_OUTOF10: 0
PAINLEVEL_OUTOF10: 6
PAINLEVEL_OUTOF10: 3

## 2025-08-12 ASSESSMENT — PAIN DESCRIPTION - ONSET: ONSET: ON-GOING

## 2025-08-12 ASSESSMENT — PAIN DESCRIPTION - DESCRIPTORS: DESCRIPTORS: DISCOMFORT;SHARP

## 2025-08-12 ASSESSMENT — PAIN DESCRIPTION - ORIENTATION: ORIENTATION: RIGHT

## 2025-08-12 ASSESSMENT — PAIN DESCRIPTION - FREQUENCY: FREQUENCY: CONTINUOUS

## 2025-08-12 ASSESSMENT — PAIN DESCRIPTION - LOCATION: LOCATION: SHOULDER

## 2025-08-12 ASSESSMENT — PAIN DESCRIPTION - PAIN TYPE: TYPE: ACUTE PAIN

## 2025-08-15 ENCOUNTER — OFFICE VISIT (OUTPATIENT)
Age: 80
End: 2025-08-15
Payer: MEDICARE

## 2025-08-15 VITALS
RESPIRATION RATE: 18 BRPM | WEIGHT: 139.2 LBS | HEART RATE: 72 BPM | BODY MASS INDEX: 23.16 KG/M2 | DIASTOLIC BLOOD PRESSURE: 80 MMHG | OXYGEN SATURATION: 99 % | SYSTOLIC BLOOD PRESSURE: 130 MMHG

## 2025-08-15 DIAGNOSIS — E78.5 DYSLIPIDEMIA: Primary | ICD-10-CM

## 2025-08-15 PROCEDURE — 1159F MED LIST DOCD IN RCRD: CPT | Performed by: INTERNAL MEDICINE

## 2025-08-15 PROCEDURE — 1123F ACP DISCUSS/DSCN MKR DOCD: CPT | Performed by: INTERNAL MEDICINE

## 2025-08-15 PROCEDURE — 99214 OFFICE O/P EST MOD 30 MIN: CPT | Performed by: INTERNAL MEDICINE

## 2025-08-15 PROCEDURE — G8399 PT W/DXA RESULTS DOCUMENT: HCPCS | Performed by: INTERNAL MEDICINE

## 2025-08-15 PROCEDURE — G8420 CALC BMI NORM PARAMETERS: HCPCS | Performed by: INTERNAL MEDICINE

## 2025-08-15 PROCEDURE — 1036F TOBACCO NON-USER: CPT | Performed by: INTERNAL MEDICINE

## 2025-08-15 PROCEDURE — 1090F PRES/ABSN URINE INCON ASSESS: CPT | Performed by: INTERNAL MEDICINE

## 2025-08-15 PROCEDURE — G8427 DOCREV CUR MEDS BY ELIG CLIN: HCPCS | Performed by: INTERNAL MEDICINE

## 2025-08-15 PROCEDURE — 3079F DIAST BP 80-89 MM HG: CPT | Performed by: INTERNAL MEDICINE

## 2025-08-15 PROCEDURE — 1126F AMNT PAIN NOTED NONE PRSNT: CPT | Performed by: INTERNAL MEDICINE

## 2025-08-15 PROCEDURE — 1111F DSCHRG MED/CURRENT MED MERGE: CPT | Performed by: INTERNAL MEDICINE

## 2025-08-15 PROCEDURE — 3075F SYST BP GE 130 - 139MM HG: CPT | Performed by: INTERNAL MEDICINE

## 2025-08-15 RX ORDER — SPIRONOLACTONE 50 MG/1
50 TABLET, FILM COATED ORAL DAILY
Qty: 90 TABLET | Refills: 3
Start: 2025-08-15

## 2025-08-15 ASSESSMENT — ENCOUNTER SYMPTOMS
EYES NEGATIVE: 1
GASTROINTESTINAL NEGATIVE: 1
SHORTNESS OF BREATH: 1
BLOOD IN STOOL: 0
CHEST TIGHTNESS: 0
NAUSEA: 0
COUGH: 0
STRIDOR: 0
WHEEZING: 0

## (undated) DEVICE — TRAP POLYP BALEEN

## (undated) DEVICE — SNARE ENDOSCP AD L240CM LOOP W10MM SHTH DIA2.4MM RND INSUL

## (undated) DEVICE — ENDO CARRY-ON PROCEDURE KIT: Brand: ENDO CARRY-ON PROCEDURE KIT

## (undated) DEVICE — BRUSH ENDO CLN L90.5IN SHTH DIA1.7MM BRIST DIA5-7MM 2-6MM

## (undated) DEVICE — DILATOR ES 54FR 18MM OVR THE GWIRE FLX SAFEGUIDE

## (undated) DEVICE — GLOVE ORANGE PI 8 1/2   MSG9085

## (undated) DEVICE — TUBING, SUCTION, 1/4" X 10', STRAIGHT: Brand: MEDLINE

## (undated) DEVICE — ADAPTER FLSH PMP FLD MGMT GI IRRIG OFP 2 DISPOSABLE

## (undated) DEVICE — Device: Brand: ENDO SMARTCAP

## (undated) DEVICE — SAVARY-GILLIARD WIRE GUIDE: Brand: SAVARY-GILLIARD

## (undated) DEVICE — SINGLE PORT MANIFOLD: Brand: NEPTUNE 2

## (undated) DEVICE — Z DISCONTINUED W/NO SUB ELECTRODE PT RET L9FT HI MOIST COND ADH HYDRGEL CORDED

## (undated) DEVICE — Z DISCONTINUED PER STERIS KIT PEG INIT PLCMNT SAFT 20FR

## (undated) DEVICE — TUBE SET 96 MM 64 MM H2O PERISTALTIC STD AUX CHANNEL

## (undated) DEVICE — CONMED SCOPE SAVER BITE BLOCK, 20X27 MM: Brand: SCOPE SAVER

## (undated) DEVICE — Device

## (undated) DEVICE — 4-PORT MANIFOLD: Brand: NEPTUNE 2